# Patient Record
Sex: FEMALE | Race: ASIAN | NOT HISPANIC OR LATINO | Employment: OTHER | URBAN - METROPOLITAN AREA
[De-identification: names, ages, dates, MRNs, and addresses within clinical notes are randomized per-mention and may not be internally consistent; named-entity substitution may affect disease eponyms.]

---

## 2020-10-07 PROBLEM — I87.2 VENOUS STASIS DERMATITIS OF LEFT LOWER EXTREMITY: Status: ACTIVE | Noted: 2020-10-07

## 2020-10-07 PROBLEM — I73.9 PERIPHERAL VASCULAR DISEASE (HCC): Status: ACTIVE | Noted: 2020-10-07

## 2020-11-23 ENCOUNTER — OFFICE VISIT (OUTPATIENT)
Dept: VASCULAR LAB | Facility: MEDICAL CENTER | Age: 70
End: 2020-11-23
Attending: FAMILY MEDICINE
Payer: MEDICARE

## 2020-11-23 VITALS
BODY MASS INDEX: 36.19 KG/M2 | SYSTOLIC BLOOD PRESSURE: 145 MMHG | HEIGHT: 64 IN | WEIGHT: 212 LBS | DIASTOLIC BLOOD PRESSURE: 95 MMHG | HEART RATE: 96 BPM

## 2020-11-23 DIAGNOSIS — I87.332 STASIS DERMATITIS OF LEFT LOWER EXTREMITY WITH VENOUS ULCER DUE TO CHRONIC PERIPHERAL VENOUS HYPERTENSION (HCC): ICD-10-CM

## 2020-11-23 DIAGNOSIS — M79.605 PAIN IN LEFT LEG: ICD-10-CM

## 2020-11-23 DIAGNOSIS — G14 POSTPOLIOMYELITIS SYNDROME: ICD-10-CM

## 2020-11-23 DIAGNOSIS — I87.2 CHRONIC VENOUS INSUFFICIENCY: ICD-10-CM

## 2020-11-23 DIAGNOSIS — R60.0 LOCALIZED EDEMA: ICD-10-CM

## 2020-11-23 DIAGNOSIS — R03.0 ELEVATED BLOOD-PRESSURE READING WITHOUT DIAGNOSIS OF HYPERTENSION: ICD-10-CM

## 2020-11-23 PROCEDURE — 99212 OFFICE O/P EST SF 10 MIN: CPT

## 2020-11-23 PROCEDURE — 99204 OFFICE O/P NEW MOD 45 MIN: CPT | Performed by: FAMILY MEDICINE

## 2020-11-23 ASSESSMENT — ENCOUNTER SYMPTOMS
COUGH: 0
WHEEZING: 0
DIARRHEA: 0
SORE THROAT: 0
ABDOMINAL PAIN: 0
BLOOD IN STOOL: 0
NAUSEA: 0
MYALGIAS: 0
HEMOPTYSIS: 0
DOUBLE VISION: 0
CLAUDICATION: 0
WEAKNESS: 0
SHORTNESS OF BREATH: 0
VOMITING: 0
NERVOUS/ANXIOUS: 0
TREMORS: 0
PALPITATIONS: 0
SEIZURES: 0
FOCAL WEAKNESS: 0
ORTHOPNEA: 0
INSOMNIA: 0
FEVER: 0
BLURRED VISION: 0
DIZZINESS: 0
BRUISES/BLEEDS EASILY: 0
CHILLS: 0
HEADACHES: 0
DEPRESSION: 0

## 2020-11-23 NOTE — PATIENT INSTRUCTIONS
"- check venous reflux duplex to eval for possible options for interventions   - start/continue knee-high compression socks, 20-30mmHg, as much as tolerated    - increase walking, avoid prolonged standing   - elevated legs while sitting and sleeping above heart level  - reduce sodium (\"salt\") in diet to less than 2,000mg daily   - continue daily moisturizing lotion (such as Gold Bond Diabetic Foot Cream)    "

## 2020-11-23 NOTE — PROGRESS NOTES
INITIAL VASCULAR VISIT  Subjective:   Donna Palmer is a 69 y.o. y.o. female  who presents today 11/23/20  for   Chief Complaint   Patient presents with   • Office Visit     consult - Peripheral vascular disease      initially referred by Roman Walton (FNP, PCP) for eval and med mgmt of CVI, PAD     HPI:    CVI:   Seen by PCP and tx with keflex for venous stasis derm with secondary cellulitis on 10/7.    Reports worsening redness and swelling.   Was also painful.   Currently states reduced swelling and redness.     Having recurrent infections.   No recent labs or imaging completed   Had prior eval with Dr. Driscoll 7 yrs ago - had some reflux.     HTN:  Current HTN concerns: Denies   Current ADRs: No  HTN sx:  No current blurred or changed vision, chest pain, shortness of breath, headache, nausea, dizziness/vertigo   Home BP log: not checking   24h ABPM completed: not tested  Adherence to current HTN meds: compliant all of the time    Hyperlipidemia:    No prior dx or meds     Antiplatelet/anticoagulation:   no    Type 2 DM:  No     CKD:    No     Sleeping disorder/FRANK:   Reports heavy snoring per , gets adequate sleep  No PSG  No problems with work day somnolence.      Hypothyroidism:   No     Clinical evidence of ASCVD:    1) hx of MI or other ACS:  no  2) coronary or other revasc procedure: no  3) TIA/ischemic CVA: no  4) AS-related PAD (including MONICA <0.9): no  5) other AS diseases (renal AS, AA due to AS, carotid plaque >50% stenosis): no  6) evidence of AS from imaging (angiography, stress tests, CAC >300 or >74%ile for age/gender): no    Major ASCVD risk factors (probably 2 ):    1) Age (Men>44, women>54):  yes   2) Fhx early CHD (MI, SCD, coronary revasc procedures in men <55, women <65):  no   3) cigarette smoking:   reports that she has never smoked. She has never used smokeless tobacco.   4) high BP >139/89 or on tx: no   5) Low HDL-C (<40 men, <50 women):No results found for: HDL    Past Medical  History:   Diagnosis Date   • Chronic venous insufficiency    • Polio osteopathy of lower leg (HCC)      Past Surgical History:   Procedure Laterality Date   • ELBOW ORIF Right 2008   • PRIMARY C SECTION        Current Outpatient Medications on File Prior to Visit   Medication Sig Dispense Refill   • Multiple Vitamins-Minerals (MULTIVITAMIN ADULT PO) Take  by mouth.       No current facility-administered medications on file prior to visit.      Allergies   Allergen Reactions   • Penicillin G    • Tetracycline      Family History   Problem Relation Age of Onset   • Kidney Disease Father          93   • No Known Problems Brother    • Clotting Disorder Neg Hx    • Stroke Neg Hx    • Heart Attack Neg Hx         Social History     Tobacco Use   • Smoking status: Never Smoker   • Smokeless tobacco: Never Used   Substance Use Topics   • Alcohol use: No   • Drug use: No     DIET AND EXERCISE:  Weight Change: stable   BMI Readings from Last 5 Encounters:   20 36.39 kg/m²   10/07/20 37.55 kg/m²   19 34.84 kg/m²   19 35.36 kg/m²   19 35.02 kg/m²      Diet: common adult  Exercise: no regular exercise program     Review of Systems   Constitutional: Negative for chills, fever and malaise/fatigue.   HENT: Negative for nosebleeds, sore throat and tinnitus.    Eyes: Negative for blurred vision and double vision.   Respiratory: Negative for cough, hemoptysis, shortness of breath and wheezing.    Cardiovascular: Positive for leg swelling. Negative for chest pain, palpitations, orthopnea and claudication.   Gastrointestinal: Negative for abdominal pain, blood in stool, diarrhea, melena, nausea and vomiting.   Genitourinary: Negative for hematuria.   Musculoskeletal: Negative for joint pain and myalgias.   Skin: Negative for itching and rash.   Neurological: Negative for dizziness, tremors, focal weakness, seizures, weakness and headaches.   Endo/Heme/Allergies: Does not bruise/bleed easily.    "  Psychiatric/Behavioral: Negative for depression. The patient is not nervous/anxious and does not have insomnia.       Objective:     Vitals:    20 1358 20 1400   BP: 151/83 145/95   BP Location: Right arm Right arm   Patient Position: Sitting Sitting   BP Cuff Size: Adult Adult   Pulse: 98 96   Weight: 96.2 kg (212 lb)    Height: 1.626 m (5' 4\")       BP Readings from Last 5 Encounters:   20 145/95   10/07/20 156/84   19 124/68   19 132/84   19 110/72      Body mass index is 36.39 kg/m².  Physical Exam  Vitals signs reviewed.   Constitutional:       Appearance: Normal appearance.   HENT:      Head: Normocephalic and atraumatic.      Nose: Nose normal.      Mouth/Throat:      Mouth: Mucous membranes are moist.      Pharynx: Oropharynx is clear.   Eyes:      Extraocular Movements: Extraocular movements intact.      Conjunctiva/sclera: Conjunctivae normal.   Neck:      Musculoskeletal: Normal range of motion and neck supple.   Cardiovascular:      Rate and Rhythm: Normal rate and regular rhythm.      Pulses: Normal pulses.           Carotid pulses are 2+ on the right side and 2+ on the left side.       Radial pulses are 2+ on the right side and 2+ on the left side.        Dorsalis pedis pulses are 2+ on the right side and 2+ on the left side.        Posterior tibial pulses are 2+ on the right side and 2+ on the left side.      Heart sounds: Normal heart sounds.      Comments:  stemmer's negative     Spider telangectasia:       RLE:  None      LLE: none   Varicosities:           RLE: none      LLE: none   Corona phlebectatica:      RLE:  None        LLE:  None   Cording:         RLE:  None     LLE: None     Pulmonary:      Effort: Pulmonary effort is normal.      Breath sounds: Normal breath sounds.   Abdominal:      General: Abdomen is flat. Bowel sounds are normal.      Palpations: Abdomen is soft.   Musculoskeletal:      Right lower leg: No edema.      Left lower le+ Edema " "present.   Skin:     General: Skin is warm and dry.      Capillary Refill: Capillary refill takes less than 2 seconds.          Neurological:      General: No focal deficit present.      Mental Status: She is alert and oriented to person, place, and time. Mental status is at baseline.   Psychiatric:         Mood and Affect: Mood normal.         Behavior: Behavior normal.                               VASCULAR IMAGING:   Last EKG: No results found for this or any previous visit.      Medical Decision Making:  Today's Assessment / Status / Plan:     1. Chronic venous insufficiency  US-EXTREMITY VENOUS LOWER UNILAT LEFT    US-MONICA SINGLE LEVEL BILAT   2. Localized edema  US-MONICA SINGLE LEVEL BILAT   3. BMI 36.0-36.9,adult     4. Postpoliomyelitis syndrome     5. Pain in left leg  US-EXTREMITY VENOUS LOWER UNILAT LEFT    US-MONICA SINGLE LEVEL BILAT   6. Stasis dermatitis of left lower extremity with venous ulcer due to chronic peripheral venous hypertension (HCC)  US-EXTREMITY VENOUS LOWER UNILAT LEFT    US-MONICA SINGLE LEVEL BILAT   7. Elevated blood-pressure reading without diagnosis of hypertension  CBC WITH DIFFERENTIAL    Lipid Profile    Comp Metabolic Panel    TSH WITH REFLEX TO FT4    URINALYSIS    MICROALBUMIN CREAT RATIO URINE     Patient Type: Primary Prevention    Etiology of Established CVD if Present:   1) CVI     Chronic venous insufficiency  CEAP classification: C4aS or C5S / Ep / An / Pn   No signs of active ulcerations but possible healed at LLE   - reviewed pathophys and gave handouts   - check venous reflux duplex to eval for possible options for interventions if noted to have isolated superficial reflux only,  If deep and/or mixed then med mgmt only   - eval MONICA for art insuff due to leg pain   - start/continue knee-high compression socks, 20-30mmHg, as much as tolerated    - increase walking, avoid prolonged standing   - elevated legs while sitting and sleeping above heart level  - reduce sodium (\"salt\") " in diet to less than 2,000mg daily   - continue daily moisturizing lotion (such as Gold Bond Diabetic Foot Cream)  Phlebotonics:  - consider horse chestnut seed extract 300mg 2 times daily    Antithrombotic therapy:  Indication: possibly if has prior venous ulcers   - will consider initiation of aspirin     Lipid Management: Qualifies for Statin Therapy Based on 2018 ACC/AHA Guidelines: unknown  Calculated 10-Year Risk of ASCVD (if LDL <189, no CKD G3b/4/5): pending   Currently on Statin: No  NLA/ACC/AHA risk category:  Moderate vs high   - update labs  - consider statin therapy     Blood Pressure Management:  ACC/AHA (2017) goal <130/80  Home BP at goal:  yes  Office BP at goal:  no  Indications of end organ damage:   Echo/EKG: N/A    UACR: pending    Renal parenchymal disease:  pending   Ophthalmo: N/A    Device candidate? no  Plan:   Monitoring:   - start/continue home BP monitoring, reviewed correct technique, provide BP log and instructions  - order 24h ABPM:  NO  - monitor lytes/gfr routinely   - contact office if BP consistently >140/>90 to discussion of tx adjustments   Medications:  ACEi/ARB: use as 1st line agent   DHP-CCB: none   Thiazide: none   Faizan-receptor Antagonist: not indicated at this time     Glycemic Status: Normal    Smoking:   reports that she has never smoked. She has never used smokeless tobacco.   - continued complete avoidance of all tobacco products     Physical Activity: continue healthy activity to improve CV fitness, see care instructions for additional details     Weight Management and Nutrition: Dietary plan was discussed with patient at this visit including DASH, low sodium and/or as outlined in care instructions     Other:   1) postpolio syndrome  - limited mobility, use chair exercises     Instructed to follow-up with PCP for remainder of adult medical needs: yes  We will partner with other providers in the management of established vascular disease and cardiometabolic risk  factors.    Studies to Be Obtained: MONICA, VR duplex    Labs to Be Obtained: as noted above     Follow up in: Abhishek with demi Merlos M.D.  [facility]   [facility]

## 2020-12-07 ENCOUNTER — HOSPITAL ENCOUNTER (OUTPATIENT)
Dept: LAB | Facility: MEDICAL CENTER | Age: 70
End: 2020-12-07
Attending: FAMILY MEDICINE
Payer: MEDICARE

## 2020-12-07 ENCOUNTER — APPOINTMENT (OUTPATIENT)
Dept: RADIOLOGY | Facility: MEDICAL CENTER | Age: 70
End: 2020-12-07
Attending: FAMILY MEDICINE
Payer: MEDICARE

## 2020-12-07 DIAGNOSIS — R03.0 ELEVATED BLOOD-PRESSURE READING WITHOUT DIAGNOSIS OF HYPERTENSION: ICD-10-CM

## 2020-12-07 LAB
ALBUMIN SERPL BCP-MCNC: 4.3 G/DL (ref 3.2–4.9)
ALBUMIN/GLOB SERPL: 1.3 G/DL
ALP SERPL-CCNC: 80 U/L (ref 30–99)
ALT SERPL-CCNC: 27 U/L (ref 2–50)
ANION GAP SERPL CALC-SCNC: 9 MMOL/L (ref 7–16)
APPEARANCE UR: CLEAR
AST SERPL-CCNC: 25 U/L (ref 12–45)
BACTERIA #/AREA URNS HPF: NEGATIVE /HPF
BASOPHILS # BLD AUTO: 0.6 % (ref 0–1.8)
BASOPHILS # BLD: 0.05 K/UL (ref 0–0.12)
BILIRUB SERPL-MCNC: 0.4 MG/DL (ref 0.1–1.5)
BILIRUB UR QL STRIP.AUTO: NEGATIVE
BUN SERPL-MCNC: 14 MG/DL (ref 8–22)
CALCIUM SERPL-MCNC: 9.8 MG/DL (ref 8.5–10.5)
CHLORIDE SERPL-SCNC: 103 MMOL/L (ref 96–112)
CO2 SERPL-SCNC: 26 MMOL/L (ref 20–33)
COLOR UR: YELLOW
CREAT SERPL-MCNC: 0.59 MG/DL (ref 0.5–1.4)
CREAT UR-MCNC: 36.03 MG/DL
EOSINOPHIL # BLD AUTO: 0.08 K/UL (ref 0–0.51)
EOSINOPHIL NFR BLD: 1 % (ref 0–6.9)
EPI CELLS #/AREA URNS HPF: NEGATIVE /HPF
ERYTHROCYTE [DISTWIDTH] IN BLOOD BY AUTOMATED COUNT: 44.4 FL (ref 35.9–50)
GLOBULIN SER CALC-MCNC: 3.3 G/DL (ref 1.9–3.5)
GLUCOSE SERPL-MCNC: 100 MG/DL (ref 65–99)
GLUCOSE UR STRIP.AUTO-MCNC: NEGATIVE MG/DL
HCT VFR BLD AUTO: 46.4 % (ref 37–47)
HGB BLD-MCNC: 14.9 G/DL (ref 12–16)
HYALINE CASTS #/AREA URNS LPF: NORMAL /LPF
IMM GRANULOCYTES # BLD AUTO: 0.03 K/UL (ref 0–0.11)
IMM GRANULOCYTES NFR BLD AUTO: 0.4 % (ref 0–0.9)
KETONES UR STRIP.AUTO-MCNC: NEGATIVE MG/DL
LEUKOCYTE ESTERASE UR QL STRIP.AUTO: ABNORMAL
LYMPHOCYTES # BLD AUTO: 3.07 K/UL (ref 1–4.8)
LYMPHOCYTES NFR BLD: 38.7 % (ref 22–41)
MCH RBC QN AUTO: 28.7 PG (ref 27–33)
MCHC RBC AUTO-ENTMCNC: 32.1 G/DL (ref 33.6–35)
MCV RBC AUTO: 89.4 FL (ref 81.4–97.8)
MICRO URNS: ABNORMAL
MICROALBUMIN UR-MCNC: <1.2 MG/DL
MICROALBUMIN/CREAT UR: NORMAL MG/G (ref 0–30)
MONOCYTES # BLD AUTO: 0.59 K/UL (ref 0–0.85)
MONOCYTES NFR BLD AUTO: 7.4 % (ref 0–13.4)
NEUTROPHILS # BLD AUTO: 4.12 K/UL (ref 2–7.15)
NEUTROPHILS NFR BLD: 51.9 % (ref 44–72)
NITRITE UR QL STRIP.AUTO: NEGATIVE
NRBC # BLD AUTO: 0 K/UL
NRBC BLD-RTO: 0 /100 WBC
PH UR STRIP.AUTO: 6.5 [PH] (ref 5–8)
PLATELET # BLD AUTO: 287 K/UL (ref 164–446)
PMV BLD AUTO: 10.1 FL (ref 9–12.9)
POTASSIUM SERPL-SCNC: 4.4 MMOL/L (ref 3.6–5.5)
PROT SERPL-MCNC: 7.6 G/DL (ref 6–8.2)
PROT UR QL STRIP: NEGATIVE MG/DL
RBC # BLD AUTO: 5.19 M/UL (ref 4.2–5.4)
RBC # URNS HPF: NORMAL /HPF
RBC UR QL AUTO: NEGATIVE
SODIUM SERPL-SCNC: 138 MMOL/L (ref 135–145)
SP GR UR STRIP.AUTO: 1.01
UROBILINOGEN UR STRIP.AUTO-MCNC: 0.2 MG/DL
WBC # BLD AUTO: 7.9 K/UL (ref 4.8–10.8)
WBC #/AREA URNS HPF: NORMAL /HPF

## 2020-12-07 PROCEDURE — 82570 ASSAY OF URINE CREATININE: CPT

## 2020-12-07 PROCEDURE — 81001 URINALYSIS AUTO W/SCOPE: CPT

## 2020-12-07 PROCEDURE — 80053 COMPREHEN METABOLIC PANEL: CPT

## 2020-12-07 PROCEDURE — 82043 UR ALBUMIN QUANTITATIVE: CPT

## 2020-12-07 PROCEDURE — 36415 COLL VENOUS BLD VENIPUNCTURE: CPT

## 2020-12-07 PROCEDURE — 85025 COMPLETE CBC W/AUTO DIFF WBC: CPT

## 2020-12-08 ENCOUNTER — HOSPITAL ENCOUNTER (OUTPATIENT)
Dept: RADIOLOGY | Facility: MEDICAL CENTER | Age: 70
End: 2020-12-08
Attending: FAMILY MEDICINE
Payer: MEDICARE

## 2020-12-08 DIAGNOSIS — I87.332 STASIS DERMATITIS OF LEFT LOWER EXTREMITY WITH VENOUS ULCER DUE TO CHRONIC PERIPHERAL VENOUS HYPERTENSION (HCC): ICD-10-CM

## 2020-12-08 DIAGNOSIS — M79.605 PAIN IN LEFT LEG: ICD-10-CM

## 2020-12-08 DIAGNOSIS — I87.2 CHRONIC VENOUS INSUFFICIENCY: ICD-10-CM

## 2020-12-08 PROCEDURE — 93922 UPR/L XTREMITY ART 2 LEVELS: CPT

## 2020-12-08 PROCEDURE — 93922 UPR/L XTREMITY ART 2 LEVELS: CPT | Mod: 26 | Performed by: INTERNAL MEDICINE

## 2021-01-05 ASSESSMENT — ENCOUNTER SYMPTOMS
ORTHOPNEA: 0
FEVER: 0
DEPRESSION: 0
FOCAL WEAKNESS: 0
INSOMNIA: 0
SORE THROAT: 0
HEADACHES: 0
PALPITATIONS: 0
DIARRHEA: 0
BRUISES/BLEEDS EASILY: 0
SEIZURES: 0
WHEEZING: 0
HEMOPTYSIS: 0
SHORTNESS OF BREATH: 0
CLAUDICATION: 0
NAUSEA: 0
BLURRED VISION: 0
TREMORS: 0
COUGH: 0
MYALGIAS: 0
DIZZINESS: 0
ABDOMINAL PAIN: 0
VOMITING: 0
NERVOUS/ANXIOUS: 0
DOUBLE VISION: 0
BLOOD IN STOOL: 0
CHILLS: 0

## 2021-01-05 NOTE — PROGRESS NOTES
FOLLOW UP VASCULAR VISIT  Subjective:   Donna Palmer is a 70 y.o. y.o. female  who presents today 1/7/21  for   Chief Complaint   Patient presents with   • Office Visit     f/u -  med mgmt of CVI, PAD     initially referred by Roman Walton (FNP, PCP) for eval and med mgmt of CVI, PAD     HPI:    CVI:   Seen by PCP and tx with keflex for venous stasis derm with secondary cellulitis on 10/7.    Reports worsening redness and swelling.   Was also painful.   Currently states reduced swelling and redness.     Having recurrent infections.   No recent labs or imaging completed   Had prior eval with Dr. Driscoll 7 yrs ago - had some reflux.   Venous reflux study cancelled by x-ray    HTN:  Current HTN concerns: Denies   Current ADRs: No  HTN sx:  No current blurred or changed vision, chest pain, shortness of breath, headache, nausea, dizziness/vertigo   Home BP log: not checking   24h ABPM completed: not tested  Adherence to current HTN meds: compliant all of the time    Hyperlipidemia:    No prior dx or meds  Lipid study cancelled by lab     Antiplatelet/anticoagulation:   no    Type 2 DM:  No     CKD:    No     Sleeping disorder/FRANK:   Reports heavy snoring per , gets adequate sleep  No PSG  No problems with work day somnolence.      Hypothyroidism: Thyroid study cancelled by lab   No     Clinical evidence of ASCVD:    1) hx of MI or other ACS:  no  2) coronary or other revasc procedure: no  3) TIA/ischemic CVA: no  4) AS-related PAD (including MONICA <0.9): no  5) other AS diseases (renal AS, AA due to AS, carotid plaque >50% stenosis): no  6) evidence of AS from imaging (angiography, stress tests, CAC >300 or >74%ile for age/gender): no    Major ASCVD risk factors (probably 2 ):    1) Age (Men>44, women>54):  yes   2) Fhx early CHD (MI, SCD, coronary revasc procedures in men <55, women <65):  no   3) cigarette smoking:   reports that she has never smoked. She has never used smokeless tobacco.   4) high BP >139/89 or  on tx: no   5) Low HDL-C (<40 men, <50 women):No results found for: HDL    Past Medical History:   Diagnosis Date   • Chronic venous insufficiency    • Polio osteopathy of lower leg (HCC)      Past Surgical History:   Procedure Laterality Date   • ELBOW ORIF Right 2008   • PRIMARY C SECTION        Current Outpatient Medications on File Prior to Visit   Medication Sig Dispense Refill   • Multiple Vitamins-Minerals (MULTIVITAMIN ADULT PO) Take  by mouth.       No current facility-administered medications on file prior to visit.      Allergies   Allergen Reactions   • Penicillin G    • Tetracycline      Family History   Problem Relation Age of Onset   • Kidney Disease Father          93   • No Known Problems Brother    • Clotting Disorder Neg Hx    • Stroke Neg Hx    • Heart Attack Neg Hx         Social History     Tobacco Use   • Smoking status: Never Smoker   • Smokeless tobacco: Never Used   Substance Use Topics   • Alcohol use: No   • Drug use: No     DIET AND EXERCISE:  Weight Change: stable   BMI Readings from Last 5 Encounters:   21 36.39 kg/m²   20 36.39 kg/m²   10/07/20 37.55 kg/m²   19 34.84 kg/m²   19 35.36 kg/m²      Diet: common adult  Exercise: no regular exercise program     Review of Systems   Constitutional: Negative for chills, fever and malaise/fatigue.   HENT: Negative for nosebleeds, sore throat and tinnitus.    Eyes: Negative for blurred vision and double vision.   Respiratory: Negative for cough, hemoptysis, shortness of breath and wheezing.    Cardiovascular: Positive for leg swelling. Negative for chest pain, palpitations, orthopnea and claudication.   Gastrointestinal: Negative for abdominal pain, blood in stool, diarrhea, melena, nausea and vomiting.   Genitourinary: Negative for hematuria.   Musculoskeletal: Negative for joint pain and myalgias.   Skin: Negative for itching and rash.   Neurological: Positive for weakness (post polio syndrome). Negative  "for dizziness, tremors, focal weakness, seizures and headaches.   Endo/Heme/Allergies: Does not bruise/bleed easily.   Psychiatric/Behavioral: Negative for depression. The patient is not nervous/anxious and does not have insomnia.       Objective:     Vitals:    01/07/21 1356 01/07/21 1359   BP: 155/77 143/71   BP Location: Left arm Left arm   Patient Position: Sitting Sitting   BP Cuff Size: Adult Adult   Pulse: 86 81   Weight: 96.2 kg (212 lb)    Height: 1.626 m (5' 4\")       BP Readings from Last 5 Encounters:   01/07/21 143/71   11/23/20 145/95   10/07/20 156/84   06/17/19 124/68   04/23/19 132/84      Body mass index is 36.39 kg/m².  Physical Exam  Vitals signs reviewed.   Constitutional:       Appearance: Normal appearance.   HENT:      Head: Normocephalic and atraumatic.      Nose: Nose normal.      Mouth/Throat:      Mouth: Mucous membranes are moist.      Pharynx: Oropharynx is clear.   Eyes:      Extraocular Movements: Extraocular movements intact.      Conjunctiva/sclera: Conjunctivae normal.   Neck:      Musculoskeletal: Normal range of motion and neck supple.   Cardiovascular:      Rate and Rhythm: Normal rate and regular rhythm.      Pulses: Normal pulses.           Carotid pulses are 2+ on the right side and 2+ on the left side.       Radial pulses are 2+ on the right side and 2+ on the left side.        Dorsalis pedis pulses are 2+ on the right side and 2+ on the left side.        Posterior tibial pulses are 2+ on the right side and 2+ on the left side.      Heart sounds: Normal heart sounds.      Comments:  stemmer's negative     Spider telangectasia:       RLE:  None      LLE: none   Varicosities:           RLE: none      LLE: none   Corona phlebectatica:      RLE:  None        LLE:  None   Cording:         RLE:  None     LLE: None     Pulmonary:      Effort: Pulmonary effort is normal.      Breath sounds: Normal breath sounds.   Abdominal:      General: Abdomen is flat. Bowel sounds are normal.    "   Palpations: Abdomen is soft.   Musculoskeletal:      Right lower leg: No edema.      Left lower le+ Edema present.   Skin:     General: Skin is warm and dry.      Capillary Refill: Capillary refill takes less than 2 seconds.          Neurological:      General: No focal deficit present.      Mental Status: She is alert and oriented to person, place, and time. Mental status is at baseline.   Psychiatric:         Mood and Affect: Mood normal.         Behavior: Behavior normal.                    Lab Results   Component Value Date    SODIUM 138 2020    POTASSIUM 4.4 2020    CHLORIDE 103 2020    CO2 26 2020    GLUCOSE 100 (H) 2020    BUN 14 2020    CREATININE 0.59 2020    IFAFRICA >60 2020    IFNOTAFR >60 2020        Lab Results   Component Value Date    WBC 7.9 2020    RBC 5.19 2020    HEMOGLOBIN 14.9 2020    HEMATOCRIT 46.4 2020    MCV 89.4 2020    MCH 28.7 2020    MCHC 32.1 (L) 2020    MPV 10.1 2020       VASCULAR IMAGING:   Last EKG: No results found for this or any previous visit.     MONICA 2020   Ankle-brachial index is normal.   Medical Decision Making:  Today's Assessment / Status / Plan:     1. Chronic venous insufficiency  REFERRAL TO VASCULAR MEDICINE    US-EXTREMITY VENOUS LOWER UNILAT LEFT    CANCELED: US-EXTREMITY VENOUS LOWER UNILAT LEFT   2. Pain in left leg  REFERRAL TO VASCULAR MEDICINE    US-EXTREMITY VENOUS LOWER UNILAT LEFT    CANCELED: US-EXTREMITY VENOUS LOWER UNILAT LEFT   3. Stasis dermatitis of left lower extremity with venous ulcer due to chronic peripheral venous hypertension (HCC)  REFERRAL TO VASCULAR MEDICINE    US-EXTREMITY VENOUS LOWER UNILAT LEFT    CANCELED: US-EXTREMITY VENOUS LOWER UNILAT LEFT   4. Screening for lipid disorders  LIPID PANEL    REFERRAL TO VASCULAR MEDICINE   5. Thyroid disorder screening  TSH+FREE T4    REFERRAL TO VASCULAR MEDICINE     Patient Type: Primary  "Prevention    Etiology of Established CVD if Present:   1) CVI     Chronic venous insufficiency  CEAP classification: C4aS or C5S / Ep / An / Pn   No signs of active ulcerations but possible healed at LLE   MONICA dcompleted but reflux study was cancelled by radiology   - reviewed pathophys and gave handouts   - check venous reflux duplex to eval for possible options for interventions if noted to have isolated superficial reflux only,  If deep and/or mixed then med mgmt only   - eval MONICA for art insuff due to leg pain   - start/continue knee-high compression socks, 20-30mmHg, as much as tolerated    - increase walking, avoid prolonged standing   - elevated legs while sitting and sleeping above heart level  - reduce sodium (\"salt\") in diet to less than 2,000mg daily   - continue daily moisturizing lotion (such as Gold Bond Diabetic Foot Cream)  Phlebotonics:  - consider horse chestnut seed extract 300mg 2 times daily    Antithrombotic therapy:  Indication: possibly if has prior venous ulcers   - will consider initiation of aspirin     Lipid Management: Qualifies for Statin Therapy Based on 2018 ACC/AHA Guidelines: unknown  Calculated 10-Year Risk of ASCVD (if LDL <189, no CKD G3b/4/5): pending   Currently on Statin: No, lipid dc's by lab   NLA/ACC/AHA risk category:  Moderate vs high   - update labs  - consider statin therapy     Blood Pressure Management:  ACC/AHA (2017) goal <130/80  Home BP at goal:  yes  Office BP at goal:  No   Indications of end organ damage:   Echo/EKG: N/A    UACR: pending    Renal parenchymal disease:  pending   Ophthalmo: N/A    Device candidate? no  Plan:   Monitoring: Home log 114-143/80's scanned into record  - start/continue home BP monitoring, reviewed correct technique, provide BP log and instructions  - order 24h ABPM:  NO  - monitor lytes/gfr routinely   - contact office if BP consistently >140/>90 to discussion of tx adjustments -Pt not interested in treatment at this time     "     Medications:  ACEi/ARB: use as 1st line agent   DHP-CCB: none   Thiazide: none   Faizan-receptor Antagonist: not indicated at this time     Glycemic Status: Normal    Smoking:   reports that she has never smoked. She has never used smokeless tobacco.   - continued complete avoidance of all tobacco products     Physical Activity: continue healthy activity to improve CV fitness, see care instructions for additional details     Weight Management and Nutrition: Dietary plan was discussed with patient at this visit including DASH, low sodium and/or as outlined in care instructions     Other:   1) postpolio syndrome  - limited mobility, use chair exercises     Instructed to follow-up with PCP for remainder of adult medical needs: yes  We will partner with other providers in the management of established vascular disease and cardiometabolic risk factors.    Studies to Be Obtained:  VR duplex reordered   Labs to Be Obtained: Tsh, lipids      Follow up in: Feb requested Dr. Gaurang Herrmann, A.P.RUBEN.  [facility]   [facility]

## 2021-01-07 ENCOUNTER — OFFICE VISIT (OUTPATIENT)
Dept: VASCULAR LAB | Facility: MEDICAL CENTER | Age: 71
End: 2021-01-07
Attending: NURSE PRACTITIONER
Payer: MEDICARE

## 2021-01-07 VITALS
SYSTOLIC BLOOD PRESSURE: 143 MMHG | WEIGHT: 212 LBS | HEIGHT: 64 IN | HEART RATE: 81 BPM | BODY MASS INDEX: 36.19 KG/M2 | DIASTOLIC BLOOD PRESSURE: 71 MMHG

## 2021-01-07 DIAGNOSIS — M79.605 PAIN IN LEFT LEG: ICD-10-CM

## 2021-01-07 DIAGNOSIS — Z13.29 THYROID DISORDER SCREENING: ICD-10-CM

## 2021-01-07 DIAGNOSIS — I87.332 STASIS DERMATITIS OF LEFT LOWER EXTREMITY WITH VENOUS ULCER DUE TO CHRONIC PERIPHERAL VENOUS HYPERTENSION (HCC): ICD-10-CM

## 2021-01-07 DIAGNOSIS — I87.2 CHRONIC VENOUS INSUFFICIENCY: ICD-10-CM

## 2021-01-07 DIAGNOSIS — Z13.220 SCREENING FOR LIPID DISORDERS: ICD-10-CM

## 2021-01-07 PROCEDURE — 99212 OFFICE O/P EST SF 10 MIN: CPT | Performed by: NURSE PRACTITIONER

## 2021-01-07 PROCEDURE — 99213 OFFICE O/P EST LOW 20 MIN: CPT | Performed by: NURSE PRACTITIONER

## 2021-01-07 ASSESSMENT — ENCOUNTER SYMPTOMS: WEAKNESS: 1

## 2021-01-07 ASSESSMENT — FIBROSIS 4 INDEX: FIB4 SCORE: 1.14

## 2021-01-25 ENCOUNTER — HOSPITAL ENCOUNTER (OUTPATIENT)
Dept: RADIOLOGY | Facility: MEDICAL CENTER | Age: 71
End: 2021-01-25
Attending: NURSE PRACTITIONER
Payer: MEDICARE

## 2021-01-25 DIAGNOSIS — I87.2 CHRONIC VENOUS INSUFFICIENCY: ICD-10-CM

## 2021-01-25 DIAGNOSIS — I87.332 STASIS DERMATITIS OF LEFT LOWER EXTREMITY WITH VENOUS ULCER DUE TO CHRONIC PERIPHERAL VENOUS HYPERTENSION (HCC): ICD-10-CM

## 2021-01-25 DIAGNOSIS — M79.605 PAIN IN LEFT LEG: ICD-10-CM

## 2021-01-25 PROCEDURE — 93971 EXTREMITY STUDY: CPT | Mod: LT

## 2021-01-25 PROCEDURE — 93971 EXTREMITY STUDY: CPT | Mod: 26 | Performed by: INTERNAL MEDICINE

## 2021-02-25 ENCOUNTER — OFFICE VISIT (OUTPATIENT)
Dept: VASCULAR LAB | Facility: MEDICAL CENTER | Age: 71
End: 2021-02-25
Attending: FAMILY MEDICINE
Payer: MEDICARE

## 2021-02-25 VITALS
HEART RATE: 82 BPM | SYSTOLIC BLOOD PRESSURE: 165 MMHG | HEIGHT: 64 IN | DIASTOLIC BLOOD PRESSURE: 73 MMHG | BODY MASS INDEX: 36.19 KG/M2 | WEIGHT: 212 LBS

## 2021-02-25 DIAGNOSIS — I87.332 STASIS DERMATITIS OF LEFT LOWER EXTREMITY WITH VENOUS ULCER DUE TO CHRONIC PERIPHERAL VENOUS HYPERTENSION (HCC): ICD-10-CM

## 2021-02-25 DIAGNOSIS — I87.2 CHRONIC VENOUS INSUFFICIENCY: ICD-10-CM

## 2021-02-25 DIAGNOSIS — R03.0 ELEVATED BLOOD PRESSURE READING WITHOUT DIAGNOSIS OF HYPERTENSION: ICD-10-CM

## 2021-02-25 DIAGNOSIS — R21 RASH: ICD-10-CM

## 2021-02-25 PROCEDURE — 99214 OFFICE O/P EST MOD 30 MIN: CPT | Performed by: FAMILY MEDICINE

## 2021-02-25 PROCEDURE — 99212 OFFICE O/P EST SF 10 MIN: CPT

## 2021-02-25 RX ORDER — TRIAMCINOLONE ACETONIDE 5 MG/G
CREAM TOPICAL
Qty: 60 G | Refills: 0 | Status: SHIPPED | OUTPATIENT
Start: 2021-02-25 | End: 2022-08-26

## 2021-02-25 ASSESSMENT — ENCOUNTER SYMPTOMS
COUGH: 0
SORE THROAT: 0
PALPITATIONS: 0
BRUISES/BLEEDS EASILY: 0
DOUBLE VISION: 0
NAUSEA: 0
HEMOPTYSIS: 0
FEVER: 0
ABDOMINAL PAIN: 0
DEPRESSION: 0
MYALGIAS: 0
WHEEZING: 0
DIZZINESS: 0
BLURRED VISION: 0
CLAUDICATION: 0
NERVOUS/ANXIOUS: 0
SEIZURES: 0
CHILLS: 0
WEAKNESS: 1
INSOMNIA: 0
ORTHOPNEA: 0
HEADACHES: 0
TREMORS: 0
VOMITING: 0
DIARRHEA: 0
SHORTNESS OF BREATH: 0
FOCAL WEAKNESS: 0
BLOOD IN STOOL: 0

## 2021-02-25 ASSESSMENT — FIBROSIS 4 INDEX: FIB4 SCORE: 1.14

## 2021-02-25 NOTE — PROGRESS NOTES
FOLLOW UP VASCULAR VISIT  Subjective:   Donna Palmer is a 70 y.o. y.o. female  who presents today 23 for   Chief Complaint   Patient presents with   • Follow-Up   initially referred by Roman Walton (FNP, PCP) for eval and med mgmt of CVI, PAD     HPI:    CVI:   Interval hx/concerns: no changes in health status, had VR duplex that showed no venous reflux or other venous disease.   Was ordered for left leg, but reports reads Right leg in the text area.    pmhx:  Seen by PCP and tx with keflex for venous stasis derm with secondary cellulitis on 10/2020.  Had reports worsening redness and swelling.   Was also painful.   Having recurrent infections.   No recent labs or imaging completed   Had prior eval with Dr. Driscoll (vasc surg) 7 yrs ago - reports no interventions at that time.     HTN:  Current HTN concerns: Denies prior dx or meds.   Current ADRs: No  HTN sx:  No current blurred or changed vision, chest pain, shortness of breath, headache, nausea, dizziness/vertigo   Home BP los/80s   24h ABPM completed: not tested  Adherence to current HTN meds: compliant all of the time    Hyperlipidemia:    No prior dx or meds  Never had labs     Antiplatelet/anticoagulation: no    Sleeping disorder/FRANK:   Reports heavy snoring per , gets adequate sleep  No PSG  No problems with work day somnolence.        Current Outpatient Medications:   •  triamcinolone acetonide, Apply BID to affected areas for up to 14 days  •  Multiple Vitamins-Minerals (MULTIVITAMIN ADULT PO), Take  by mouth., Taking      Social History     Tobacco Use   • Smoking status: Never Smoker   • Smokeless tobacco: Never Used   Substance Use Topics   • Alcohol use: No   • Drug use: No     DIET AND EXERCISE:  Weight Change: stable   BMI Readings from Last 5 Encounters:   21 36.39 kg/m²   21 36.39 kg/m²   20 36.39 kg/m²   10/07/20 37.55 kg/m²   19 34.84 kg/m²      Diet: common adult  Exercise: no regular exercise  "program     Review of Systems   Constitutional: Negative for chills, fever and malaise/fatigue.   HENT: Negative for nosebleeds, sore throat and tinnitus.    Eyes: Negative for blurred vision and double vision.   Respiratory: Negative for cough, hemoptysis, shortness of breath and wheezing.    Cardiovascular: Positive for leg swelling. Negative for chest pain, palpitations, orthopnea and claudication.   Gastrointestinal: Negative for abdominal pain, blood in stool, diarrhea, melena, nausea and vomiting.   Genitourinary: Negative for hematuria.   Musculoskeletal: Negative for joint pain and myalgias.   Skin: Negative for itching and rash.   Neurological: Positive for weakness (post polio syndrome). Negative for dizziness, tremors, focal weakness, seizures and headaches.   Endo/Heme/Allergies: Does not bruise/bleed easily.   Psychiatric/Behavioral: Negative for depression. The patient is not nervous/anxious and does not have insomnia.       Objective:     Vitals:    02/25/21 1440 02/25/21 1444 02/25/21 1447   BP: 157/75 151/75 (!) 165/73   BP Location: Right arm Right arm Left arm   Patient Position: Sitting Sitting Sitting   BP Cuff Size: Adult Adult Adult   Pulse: 87 80 82   Weight: 96.2 kg (212 lb)     Height: 1.626 m (5' 4\")        BP Readings from Last 5 Encounters:   02/25/21 (!) 165/73   01/07/21 143/71   11/23/20 145/95   10/07/20 156/84   06/17/19 124/68      Body mass index is 36.39 kg/m².  Physical Exam  Vitals reviewed.   Constitutional:       Appearance: Normal appearance.   HENT:      Head: Normocephalic and atraumatic.      Nose: Nose normal.      Mouth/Throat:      Mouth: Mucous membranes are moist.      Pharynx: Oropharynx is clear.   Eyes:      Extraocular Movements: Extraocular movements intact.      Conjunctiva/sclera: Conjunctivae normal.   Cardiovascular:      Rate and Rhythm: Normal rate and regular rhythm.      Pulses: Normal pulses.           Carotid pulses are 2+ on the right side and 2+ on " the left side.       Radial pulses are 2+ on the right side and 2+ on the left side.        Dorsalis pedis pulses are 2+ on the right side and 2+ on the left side.        Posterior tibial pulses are 2+ on the right side and 2+ on the left side.      Heart sounds: Normal heart sounds.      Comments:  stemmer's negative     Spider telangectasia:       RLE:  None      LLE: none   Varicosities:           RLE: none      LLE: none   Corona phlebectatica:      RLE:  None        LLE:  None   Cording:         RLE:  None     LLE: None     Pulmonary:      Effort: Pulmonary effort is normal.      Breath sounds: Normal breath sounds.   Abdominal:      General: Abdomen is flat. Bowel sounds are normal.      Palpations: Abdomen is soft.   Musculoskeletal:      Cervical back: Normal range of motion and neck supple.      Right lower leg: No edema.      Left lower leg: No edema.   Skin:     General: Skin is warm and dry.      Capillary Refill: Capillary refill takes less than 2 seconds.          Neurological:      General: No focal deficit present.      Mental Status: She is alert and oriented to person, place, and time. Mental status is at baseline.   Psychiatric:         Mood and Affect: Mood normal.         Behavior: Behavior normal.                    Lab Results   Component Value Date    SODIUM 138 12/07/2020    POTASSIUM 4.4 12/07/2020    CHLORIDE 103 12/07/2020    CO2 26 12/07/2020    GLUCOSE 100 (H) 12/07/2020    BUN 14 12/07/2020    CREATININE 0.59 12/07/2020    IFAFRICA >60 12/07/2020    IFNOTAFR >60 12/07/2020        Lab Results   Component Value Date    WBC 7.9 12/07/2020    RBC 5.19 12/07/2020    HEMOGLOBIN 14.9 12/07/2020    HEMATOCRIT 46.4 12/07/2020    MCV 89.4 12/07/2020    MCH 28.7 12/07/2020    MCHC 32.1 (L) 12/07/2020    MPV 10.1 12/07/2020     VASCULAR IMAGING:   Last EKG: No results found for this or any previous visit.     MONICA 12/8/2020   Ankle-brachial index is normal.     VR duplex 1/25/21    Right lower  "extremity - No superficial or deep venous thrombosis. No     reflux of the superficial or deep veins.    Medical Decision Making:  Today's Assessment / Status / Plan:     1. Stasis dermatitis of left lower extremity with venous ulcer due to chronic peripheral venous hypertension (HCC)  triamcinolone acetonide (KENALOG) 0.5 % Cream   2. Chronic venous insufficiency     3. Rash  REFERRAL TO DERMATOLOGY   4. Elevated blood pressure reading without diagnosis of hypertension  REFERRAL TO 24-HOUR BLOOD PRESSURE MONITORING     Patient Type: Primary Prevention    Etiology of Established CVD if Present:   1) CVI     Chronic venous insufficiency  CEAP classification: C4aS or C5S / Ep / An / Pn   No signs of active ulcerations but possible healed at LLE   MONICA normal, VR duplex w/o signs of DVT or VR.   Discrepancy between order title (Left VR duplex) and text of report reading Right LE  - we will contact radiology dept to clarify this discrepancy.  May need to have pt return to get LLE VR duplex completed (which is her symptomatic leg).    - in the meantime, I recommend eval with [facility] derm for possible skin bx as this may need be venous related dermatitis   - trial of triamcinolone cream for symptomatic care   - continue knee-high compression socks, 20-30mmHg, as much as tolerated    - increase walking, avoid prolonged standing   - elevated legs while sitting and sleeping above heart level  - reduce sodium (\"salt\") in diet to less than 2,000mg daily   - continue daily moisturizing lotion (such as Gold Bond Diabetic Foot Cream)  Phlebotonics:  - consider horse chestnut seed extract 300mg 2 times daily    Antithrombotic therapy:  Indication: possibly if has prior venous ulcers   - will consider initiation of aspirin     Lipid Management:   Qualifies for Statin Therapy Based on 2018 ACC/AHA Guidelines: unknown  Calculated 10-Year Risk of ASCVD (if LDL <189, no CKD G3b/4/5): pending   Currently on Statin: No, lipid dc's by lab "   NLA/ACC/AHA risk category:  Moderate vs high   - needs labs updated   - consider statin therapy     Blood Pressure Management:  ACC/AHA (2017) goal <130/80  Home BP at goal:  no  Office BP at goal:  no   24h ABPM: ordered today   Indications of end organ damage: none   Device candidate? no  Plan:   Monitoring: Home log 114-143/80's scanned into record  - start/continue home BP monitoring, reviewed correct technique, provide BP log and instructions  - order 24h ABPM:  yes  - monitor lytes/gfr routinely   - contact office if BP consistently >140/>90 to discussion of tx adjustments -Pt not interested in treatment at this time   Medications:  ACEi/ARB: use as 1st line agent   DHP-CCB: none   Thiazide: none   Faizan-receptor Antagonist: not indicated at this time     Glycemic Status: Normal    Smoking:   reports that she has never smoked. She has never used smokeless tobacco.   - continued complete avoidance of all tobacco products     Physical Activity: continue healthy activity to improve CV fitness, see care instructions for additional details     Weight Management and Nutrition: Dietary plan was discussed with patient at this visit including DASH, low sodium and/or as outlined in care instructions     Other:     1) postpolio syndrome  - limited mobility, use chair exercises     Instructed to follow-up with PCP for remainder of adult medical needs: yes  We will partner with other providers in the management of established vascular disease and cardiometabolic risk factors.    Studies to Be Obtained:  None   Labs to Be Obtained:     Follow up in:  3mo with demi Merlos M.D.  [facility]   [facility]   [phone]

## 2021-03-04 ENCOUNTER — TELEPHONE (OUTPATIENT)
Dept: VASCULAR LAB | Facility: MEDICAL CENTER | Age: 71
End: 2021-03-04

## 2021-03-04 NOTE — TELEPHONE ENCOUNTER
Second time calling pt back to schedule ABPM. She answered and said she is going out of town and does not have a calender in front of her and decided she would call me back to schedule when she is ready.    ----- Message from Carolyne Wells sent at 3/4/2021 10:04 AM PST -----  Regarding: ABPM  Hi Marcellus,     Pt LVM to schedule ABPM appt. I called her back to attempt to schedule. When I called, pt asked if her  could  the monitor, possibly today. She's disabled and lives in [facility]. Can you please call pt?    Thanks,     Carolyne

## 2021-05-06 ENCOUNTER — TELEPHONE (OUTPATIENT)
Dept: VASCULAR LAB | Facility: MEDICAL CENTER | Age: 71
End: 2021-05-06

## 2021-06-09 ENCOUNTER — TELEPHONE (OUTPATIENT)
Dept: VASCULAR LAB | Facility: MEDICAL CENTER | Age: 71
End: 2021-06-09

## 2021-11-09 ENCOUNTER — TELEPHONE (OUTPATIENT)
Dept: HEALTH INFORMATION MANAGEMENT | Facility: OTHER | Age: 71
End: 2021-11-09

## 2021-11-09 NOTE — TELEPHONE ENCOUNTER
Outcome: Left Message    Please transfer to Patient Outreach Team at [phone] when patient returns call.      Attempt # 1

## 2022-08-26 ENCOUNTER — HOSPITAL ENCOUNTER (OUTPATIENT)
Dept: RADIOLOGY | Facility: MEDICAL CENTER | Age: 72
End: 2022-08-26
Attending: FAMILY MEDICINE
Payer: MEDICARE

## 2022-08-26 ENCOUNTER — HOSPITAL ENCOUNTER (OUTPATIENT)
Dept: LAB | Facility: MEDICAL CENTER | Age: 72
End: 2022-08-26
Attending: FAMILY MEDICINE
Payer: MEDICARE

## 2022-08-26 ENCOUNTER — OFFICE VISIT (OUTPATIENT)
Dept: VASCULAR LAB | Facility: MEDICAL CENTER | Age: 72
End: 2022-08-26
Attending: FAMILY MEDICINE
Payer: MEDICARE

## 2022-08-26 VITALS
HEIGHT: 64 IN | BODY MASS INDEX: 34.15 KG/M2 | SYSTOLIC BLOOD PRESSURE: 140 MMHG | DIASTOLIC BLOOD PRESSURE: 83 MMHG | HEART RATE: 84 BPM | WEIGHT: 200 LBS

## 2022-08-26 DIAGNOSIS — L03.119 CELLULITIS AND ABSCESS OF FOOT: ICD-10-CM

## 2022-08-26 DIAGNOSIS — R60.0 LOCALIZED EDEMA: ICD-10-CM

## 2022-08-26 DIAGNOSIS — I10 PRIMARY HYPERTENSION: ICD-10-CM

## 2022-08-26 DIAGNOSIS — R06.02 SOB (SHORTNESS OF BREATH): ICD-10-CM

## 2022-08-26 DIAGNOSIS — I87.2 CHRONIC VENOUS INSUFFICIENCY: ICD-10-CM

## 2022-08-26 DIAGNOSIS — M79.671 RIGHT FOOT PAIN: ICD-10-CM

## 2022-08-26 DIAGNOSIS — I87.2 STASIS DERMATITIS OF BOTH LEGS: ICD-10-CM

## 2022-08-26 DIAGNOSIS — L02.619 CELLULITIS AND ABSCESS OF FOOT: ICD-10-CM

## 2022-08-26 DIAGNOSIS — G14 POSTPOLIOMYELITIS SYNDROME: ICD-10-CM

## 2022-08-26 LAB
ALBUMIN SERPL BCP-MCNC: 4.6 G/DL (ref 3.2–4.9)
ALBUMIN/GLOB SERPL: 1.4 G/DL
ALP SERPL-CCNC: 70 U/L (ref 30–99)
ALT SERPL-CCNC: 18 U/L (ref 2–50)
ANION GAP SERPL CALC-SCNC: 16 MMOL/L (ref 7–16)
AST SERPL-CCNC: 22 U/L (ref 12–45)
BASOPHILS # BLD AUTO: 0.5 % (ref 0–1.8)
BASOPHILS # BLD: 0.04 K/UL (ref 0–0.12)
BILIRUB SERPL-MCNC: 0.3 MG/DL (ref 0.1–1.5)
BUN SERPL-MCNC: 17 MG/DL (ref 8–22)
CALCIUM SERPL-MCNC: 9.6 MG/DL (ref 8.5–10.5)
CHLORIDE SERPL-SCNC: 108 MMOL/L (ref 96–112)
CO2 SERPL-SCNC: 20 MMOL/L (ref 20–33)
CREAT SERPL-MCNC: 0.56 MG/DL (ref 0.5–1.4)
CRP SERPL HS-MCNC: 0.98 MG/DL (ref 0–0.75)
D DIMER PPP IA.FEU-MCNC: 0.73 UG/ML (FEU) (ref 0–0.5)
EOSINOPHIL # BLD AUTO: 0.09 K/UL (ref 0–0.51)
EOSINOPHIL NFR BLD: 1.2 % (ref 0–6.9)
ERYTHROCYTE [DISTWIDTH] IN BLOOD BY AUTOMATED COUNT: 45.9 FL (ref 35.9–50)
ERYTHROCYTE [SEDIMENTATION RATE] IN BLOOD BY WESTERGREN METHOD: 8 MM/HOUR (ref 0–25)
GFR SERPLBLD CREATININE-BSD FMLA CKD-EPI: 98 ML/MIN/1.73 M 2
GLOBULIN SER CALC-MCNC: 3.2 G/DL (ref 1.9–3.5)
GLUCOSE SERPL-MCNC: 97 MG/DL (ref 65–99)
HCT VFR BLD AUTO: 44.9 % (ref 37–47)
HGB BLD-MCNC: 14.3 G/DL (ref 12–16)
IMM GRANULOCYTES # BLD AUTO: 0.02 K/UL (ref 0–0.11)
IMM GRANULOCYTES NFR BLD AUTO: 0.3 % (ref 0–0.9)
LYMPHOCYTES # BLD AUTO: 2.29 K/UL (ref 1–4.8)
LYMPHOCYTES NFR BLD: 31.5 % (ref 22–41)
MCH RBC QN AUTO: 28.9 PG (ref 27–33)
MCHC RBC AUTO-ENTMCNC: 31.8 G/DL (ref 33.6–35)
MCV RBC AUTO: 90.9 FL (ref 81.4–97.8)
MONOCYTES # BLD AUTO: 0.44 K/UL (ref 0–0.85)
MONOCYTES NFR BLD AUTO: 6 % (ref 0–13.4)
NEUTROPHILS # BLD AUTO: 4.4 K/UL (ref 2–7.15)
NEUTROPHILS NFR BLD: 60.5 % (ref 44–72)
NRBC # BLD AUTO: 0 K/UL
NRBC BLD-RTO: 0 /100 WBC
NT-PROBNP SERPL IA-MCNC: 42 PG/ML (ref 0–125)
PLATELET # BLD AUTO: 312 K/UL (ref 164–446)
PMV BLD AUTO: 10.6 FL (ref 9–12.9)
POTASSIUM SERPL-SCNC: 3.7 MMOL/L (ref 3.6–5.5)
PROT SERPL-MCNC: 7.8 G/DL (ref 6–8.2)
RBC # BLD AUTO: 4.94 M/UL (ref 4.2–5.4)
SODIUM SERPL-SCNC: 144 MMOL/L (ref 135–145)
WBC # BLD AUTO: 7.3 K/UL (ref 4.8–10.8)

## 2022-08-26 PROCEDURE — 80053 COMPREHEN METABOLIC PANEL: CPT

## 2022-08-26 PROCEDURE — 85025 COMPLETE CBC W/AUTO DIFF WBC: CPT

## 2022-08-26 PROCEDURE — 85652 RBC SED RATE AUTOMATED: CPT

## 2022-08-26 PROCEDURE — 73620 X-RAY EXAM OF FOOT: CPT | Mod: RT

## 2022-08-26 PROCEDURE — 85379 FIBRIN DEGRADATION QUANT: CPT

## 2022-08-26 PROCEDURE — 86140 C-REACTIVE PROTEIN: CPT

## 2022-08-26 PROCEDURE — 99214 OFFICE O/P EST MOD 30 MIN: CPT | Performed by: FAMILY MEDICINE

## 2022-08-26 PROCEDURE — 36415 COLL VENOUS BLD VENIPUNCTURE: CPT

## 2022-08-26 PROCEDURE — 99212 OFFICE O/P EST SF 10 MIN: CPT

## 2022-08-26 PROCEDURE — 83880 ASSAY OF NATRIURETIC PEPTIDE: CPT

## 2022-08-26 RX ORDER — CEPHALEXIN 500 MG/1
500 CAPSULE ORAL 3 TIMES DAILY
Qty: 21 CAPSULE | Refills: 0 | Status: SHIPPED | OUTPATIENT
Start: 2022-08-26 | End: 2022-09-02

## 2022-08-26 ASSESSMENT — FIBROSIS 4 INDEX: FIB4 SCORE: 1.16

## 2022-08-26 ASSESSMENT — ENCOUNTER SYMPTOMS
PALPITATIONS: 0
WEAKNESS: 0
CHILLS: 0
MYALGIAS: 0
BRUISES/BLEEDS EASILY: 0
COUGH: 0
ORTHOPNEA: 0
FEVER: 0
CLAUDICATION: 0
NAUSEA: 0

## 2022-08-26 NOTE — PROGRESS NOTES
FOLLOW UP VASCULAR VISIT  Donna Palmer is a  female  who presents 24 for   Chief Complaint   Patient presents with    Follow-Up     initially referred by Roman Walton (FNP, PCP) for eval and med mgmt of CVI     Subjective    Last seen 2021     CVI:   Interval hx/concerns: new onset of R foot erythema with scaling and swelling over last several weeks.  No recent fever.  Denies foot/ankle injury.  No other more proximal issues reported     HTN:  Current HTN concerns: no current meds   Home BP los/80s   Adherence to current HTN meds: n/a     Hyperlipidemia:    No prior dx or meds  Never had labs     Antiplatelet/anticoagulation: no    Sleeping disorder/FRANK:   Reports heavy snoring per , gets adequate sleep  No PSG  No problems with work day somnolence.        Current Outpatient Medications:     cephALEXin, 500 mg, Oral, TID    Multiple Vitamins-Minerals (MULTIVITAMIN ADULT PO), Take  by mouth., Taking      Social History     Tobacco Use    Smoking status: Never    Smokeless tobacco: Never   Substance Use Topics    Alcohol use: No    Drug use: No     DIET AND EXERCISE:  Weight Change: stable   BMI Readings from Last 5 Encounters:   22 34.33 kg/m²   21 36.39 kg/m²   21 36.39 kg/m²   20 36.39 kg/m²   10/07/20 37.55 kg/m²      Diet: common adult  Exercise: no regular exercise program     Review of Systems   Constitutional:  Negative for chills and fever.   Respiratory:  Negative for cough.    Cardiovascular:  Positive for leg swelling. Negative for chest pain, palpitations, orthopnea and claudication.   Gastrointestinal:  Negative for nausea.   Musculoskeletal:  Negative for myalgias.   Neurological:  Negative for weakness.   Endo/Heme/Allergies:  Does not bruise/bleed easily.     Objective      Vitals:    22 0833 22 0836   BP: (!) 144/77 (!) 140/83   BP Location: Left arm Right arm   Patient Position: Sitting Sitting   BP Cuff Size: Large adult Large adult   Pulse:  "82 84   Weight: 90.7 kg (200 lb)    Height: 1.626 m (5' 4\")         BP Readings from Last 5 Encounters:   08/26/22 (!) 140/83   02/25/21 (!) 165/73   01/07/21 143/71   11/23/20 145/95   10/07/20 156/84      Body mass index is 34.33 kg/m².  Physical Exam  Vitals reviewed.   Constitutional:       General: She is not in acute distress.     Appearance: Normal appearance.   HENT:      Head: Normocephalic and atraumatic.   Neck:      Thyroid: No thyroid mass.      Vascular: Normal carotid pulses.      Trachea: Trachea normal.   Cardiovascular:      Rate and Rhythm: Normal rate and regular rhythm.      Chest Wall: PMI is not displaced.      Pulses: Normal pulses.           Carotid pulses are 2+ on the right side and 2+ on the left side.       Radial pulses are 2+ on the right side and 2+ on the left side.        Dorsalis pedis pulses are 2+ on the right side and 2+ on the left side.        Posterior tibial pulses are 2+ on the right side and 2+ on the left side.      Heart sounds: Normal heart sounds.   Pulmonary:      Effort: Pulmonary effort is normal.      Breath sounds: Normal breath sounds.   Musculoskeletal:      Cervical back: Full passive range of motion without pain.      Right lower leg: No edema.      Left lower leg: No edema.   Skin:     General: Skin is warm and dry.      Capillary Refill: Capillary refill takes less than 2 seconds.      Coloration: Skin is not cyanotic.      Nails: There is no clubbing.   Neurological:      General: No focal deficit present.      Mental Status: She is alert and oriented to person, place, and time. Mental status is at baseline.      Cranial Nerves: No cranial nerve deficit.      Coordination: Coordination is intact. Coordination normal.      Gait: Gait is intact. Gait normal.   Psychiatric:         Mood and Affect: Mood normal.         Behavior: Behavior normal.                  Lab Results   Component Value Date    SODIUM 144 08/26/2022    POTASSIUM 3.7 08/26/2022    CHLORIDE " 108 08/26/2022    CO2 20 08/26/2022    GLUCOSE 97 08/26/2022    BUN 17 08/26/2022    CREATININE 0.56 08/26/2022    IFAFRICA >60 12/07/2020    IFNOTAFR >60 12/07/2020        Lab Results   Component Value Date    WBC 7.3 08/26/2022    RBC 4.94 08/26/2022    HEMOGLOBIN 14.3 08/26/2022    HEMATOCRIT 44.9 08/26/2022    MCV 90.9 08/26/2022    MCH 28.9 08/26/2022    MCHC 31.8 (L) 08/26/2022    MPV 10.6 08/26/2022     VASCULAR IMAGING:   Last EKG: No results found for this or any previous visit.     MONICA 12/8/2020   Ankle-brachial index is normal.     VR duplex 1/25/21    Right lower extremity - No superficial or deep venous thrombosis. No     reflux of the superficial or deep veins.          Medical Decision Making:  Today's Assessment / Status / Plan:     1. Chronic venous insufficiency  US-EXTREMITY VENOUS LOWER BILAT    Referral to Vascular Medicine      2. Localized edema  US-EXTREMITY VENOUS LOWER BILAT    proBrain Natriuretic Peptide, NT      3. Postpoliomyelitis syndrome        4. Primary hypertension        5. Right foot pain  US-EXTREMITY VENOUS LOWER BILAT    DX-FOOT-2- RIGHT      6. SOB (shortness of breath)  proBrain Natriuretic Peptide, NT      7. Cellulitis and abscess of foot  US-EXTREMITY VENOUS LOWER BILAT    CBC WITH DIFFERENTIAL    D-DIMER    Comp Metabolic Panel    CRP QUANTITIVE (NON-CARDIAC)    Sed Rate    DX-FOOT-2- RIGHT    cephALEXin (KEFLEX) 500 MG Cap      8. Stasis dermatitis of both legs          Patient Type: Primary Prevention    Etiology of Established CVD if Present:     1) Chronic venous insufficiency  CEAP classification: C4aS or C5S / Ep / An / Pn   No signs of active ulcerations but possible healed at LLE   MONICA normal, VR duplex w/o signs of DVT or VR.   Discrepancy between order title (Left VR duplex) and text of report reading Right LE  - repeat VR duplex   - trial of triamcinolone cream for symptomatic care   - continue knee-high compression socks, 20-30mmHg, as much as tolerated    -  "increase walking, avoid prolonged standing   - elevated legs while sitting and sleeping above heart level  - reduce sodium (\"salt\") in diet to less than 2,000mg daily   - continue daily moisturizing lotion (such as Gold Bond Diabetic Foot Cream)  Meds:  - continue horse chestnut seed extract 300mg 2 times daily    Cellulitis, R foot   - check labs, start keflex   - update xrays and VR duplex   - may need further eval with PCP if vasc etiology r/o and pain/swelling persists     Antithrombotic therapy:  Indication: possibly if has prior venous ulcers   - will consider initiation of aspirin     Lipid Management:   Qualifies for Statin Therapy Based on 2018 ACC/AHA Guidelines: unknown  Calculated 10-Year Risk of ASCVD (if LDL <189, no CKD G3b/4/5):  pending   Currently on Statin: No, lipid dc's by lab   Tx goaL: LDL-C <100  At goal? No labs   - needs labs updated   - consider statin therapy     Blood Pressure Management:  ACC/AHA (2017) goal <130/80  Home BP at goal:  no  Office BP at goal:  no   24h ABPM: ordered but never completed   Plan:   Monitoring:   - start/continue home BP monitoring, reviewed correct technique, provide BP log and instructions  - order 24h ABPM:  yes  - monitor lytes/gfr routinely   - contact office if BP consistently >140/>90 to discussion of tx adjustments -Pt not interested in treatment at this time   Medications:  - declines meds at this time , strongly recommended for ARB    Glycemic Status: Normal    LIFESTYLE INTERVENTIONS:    SMOKING:   reports that she has never smoked. She has never used smokeless tobacco.   - continued complete avoidance of all tobacco products     PHYSICAL ACTIVITY: continue healthy activity to improve CV fitness as per limits from postpolio syndrome     WEIGHT MANAGEMENT AND NUTRITION: Mediterranean style dietary approach       Other:     # postpolio syndrome  - limited mobility, use chair exercises     Instructed to follow-up with PCP for remainder of adult " medical needs: yes  We will partner with other providers in the management of established vascular disease and cardiometabolic risk factors.    Studies to Be Obtained:  None   Labs to Be Obtained:     Follow up in:  3-4 weeks     Sree Merlos M.D.  [facility]   [facility]   [phone]

## 2022-09-06 ENCOUNTER — APPOINTMENT (OUTPATIENT)
Dept: RADIOLOGY | Facility: MEDICAL CENTER | Age: 72
End: 2022-09-06
Attending: FAMILY MEDICINE
Payer: MEDICARE

## 2022-09-15 ENCOUNTER — HOSPITAL ENCOUNTER (OUTPATIENT)
Dept: RADIOLOGY | Facility: MEDICAL CENTER | Age: 72
End: 2022-09-15
Attending: FAMILY MEDICINE
Payer: MEDICARE

## 2022-09-15 DIAGNOSIS — M79.671 RIGHT FOOT PAIN: ICD-10-CM

## 2022-09-15 DIAGNOSIS — L02.619 CELLULITIS AND ABSCESS OF FOOT: ICD-10-CM

## 2022-09-15 DIAGNOSIS — L03.119 CELLULITIS AND ABSCESS OF FOOT: ICD-10-CM

## 2022-09-15 DIAGNOSIS — I87.2 CHRONIC VENOUS INSUFFICIENCY: ICD-10-CM

## 2022-09-15 DIAGNOSIS — R60.0 LOCALIZED EDEMA: ICD-10-CM

## 2022-09-15 PROCEDURE — 93970 EXTREMITY STUDY: CPT | Mod: 26 | Performed by: INTERNAL MEDICINE

## 2022-09-15 PROCEDURE — 93970 EXTREMITY STUDY: CPT

## 2022-09-30 ENCOUNTER — OFFICE VISIT (OUTPATIENT)
Dept: VASCULAR LAB | Facility: MEDICAL CENTER | Age: 72
End: 2022-09-30
Attending: FAMILY MEDICINE
Payer: MEDICARE

## 2022-09-30 VITALS
DIASTOLIC BLOOD PRESSURE: 82 MMHG | HEIGHT: 64 IN | BODY MASS INDEX: 34.15 KG/M2 | SYSTOLIC BLOOD PRESSURE: 139 MMHG | HEART RATE: 76 BPM | WEIGHT: 200 LBS

## 2022-09-30 DIAGNOSIS — L03.119 CELLULITIS AND ABSCESS OF FOOT: ICD-10-CM

## 2022-09-30 DIAGNOSIS — M79.671 RIGHT FOOT PAIN: ICD-10-CM

## 2022-09-30 DIAGNOSIS — R60.0 LOCALIZED EDEMA: ICD-10-CM

## 2022-09-30 DIAGNOSIS — I10 PRIMARY HYPERTENSION: ICD-10-CM

## 2022-09-30 DIAGNOSIS — I87.2 STASIS DERMATITIS OF BOTH LEGS: ICD-10-CM

## 2022-09-30 DIAGNOSIS — L02.619 CELLULITIS AND ABSCESS OF FOOT: ICD-10-CM

## 2022-09-30 DIAGNOSIS — I87.2 CHRONIC VENOUS INSUFFICIENCY: ICD-10-CM

## 2022-09-30 DIAGNOSIS — M79.3 LIPODERMATOSCLEROSIS OF BOTH LOWER EXTREMITIES: ICD-10-CM

## 2022-09-30 PROCEDURE — 99212 OFFICE O/P EST SF 10 MIN: CPT

## 2022-09-30 PROCEDURE — 99214 OFFICE O/P EST MOD 30 MIN: CPT | Performed by: FAMILY MEDICINE

## 2022-09-30 RX ORDER — ASPIRIN 81 MG/1
81 TABLET ORAL DAILY
Qty: 30 TABLET | Status: ON HOLD | COMMUNITY
End: 2023-04-04

## 2022-09-30 RX ORDER — HYDROCORTISONE ACETATE 0.5 %
1 CREAM (GRAM) TOPICAL 2 TIMES DAILY
COMMUNITY
End: 2023-02-06

## 2022-09-30 ASSESSMENT — ENCOUNTER SYMPTOMS
MYALGIAS: 0
PALPITATIONS: 0
CLAUDICATION: 0
CHILLS: 0
ORTHOPNEA: 0
COUGH: 0
WEAKNESS: 0
FEVER: 0
BRUISES/BLEEDS EASILY: 0
NAUSEA: 0

## 2022-09-30 ASSESSMENT — FIBROSIS 4 INDEX: FIB4 SCORE: 1.15

## 2022-09-30 NOTE — PROGRESS NOTES
"FOLLOW UP VASCULAR VISIT  Donna Palmer is a  female  who presents 24 for   Chief Complaint   Patient presents with    Chronic Venous Insufficiency     initially referred by Roman Walton (FNP, PCP) for eval and med mgmt of CVI     Subjective      CVI:   Interval hx/concerns: had labs, xr, duplex.  Completed keflex.   Noted to have mild high D-dimer, neg ESR, slight high CRP and wbc wnl.  BNP wnl.   Still having R foot swelling.  No other worsening skin changes     HTN:  Current HTN concerns: no current meds   Home BP los/80s   Adherence to current HTN meds: n/a     Hyperlipidemia:    No prior dx or meds  Never had labs     Antiplatelet/anticoagulation: no    Sleeping disorder/FRANK:   Reports heavy snoring per , gets adequate sleep  No PSG  No problems with work day somnolence.        Current Outpatient Medications:     [place], 1 Capsule, Oral, BID    aspirin, 81 mg, Oral, DAILY    Multiple Vitamins-Minerals (MULTIVITAMIN ADULT PO), Take  by mouth.      Social History     Tobacco Use    Smoking status: Never    Smokeless tobacco: Never   Substance Use Topics    Alcohol use: No    Drug use: No     DIET AND EXERCISE:  Weight Change: stable   BMI Readings from Last 5 Encounters:   22 34.33 kg/m²   22 34.33 kg/m²   21 36.39 kg/m²   21 36.39 kg/m²   20 36.39 kg/m²      Diet: common adult  Exercise: no regular exercise program     Review of Systems   Constitutional:  Negative for chills and fever.   Respiratory:  Negative for cough.    Cardiovascular:  Positive for leg swelling. Negative for chest pain, palpitations, orthopnea and claudication.   Gastrointestinal:  Negative for nausea.   Musculoskeletal:  Negative for myalgias.   Neurological:  Negative for weakness.   Endo/Heme/Allergies:  Does not bruise/bleed easily.     Objective      Vitals:    22 0755 22 0757   BP: 138/81 139/82   Pulse: 80 76   Weight: 90.7 kg (200 lb)    Height: 1.626 m (5' 4\")  "        BP Readings from Last 5 Encounters:   22 139/82   22 (!) 140/83   21 (!) 165/73   21 143/71   20 145/95      Body mass index is 34.33 kg/m².  Physical Exam  Vitals reviewed.   Constitutional:       General: She is not in acute distress.     Appearance: Normal appearance.   HENT:      Head: Normocephalic and atraumatic.   Neck:      Thyroid: No thyroid mass.      Vascular: Normal carotid pulses.      Trachea: Trachea normal.   Cardiovascular:      Rate and Rhythm: Normal rate and regular rhythm.      Chest Wall: PMI is not displaced.      Pulses: Normal pulses.           Dorsalis pedis pulses are 2+ on the right side and 2+ on the left side.        Posterior tibial pulses are 2+ on the right side and 2+ on the left side.      Heart sounds: Normal heart sounds.      Comments: Calf lipodermatoscler changes.  Healed bilat medial leg VLUs, nontender.  Mild rubor at dorsum of R foot  Pulmonary:      Effort: Pulmonary effort is normal.      Breath sounds: Normal breath sounds.   Musculoskeletal:      Cervical back: Full passive range of motion without pain.      Right lower le+ Pitting Edema present.      Left lower leg: No edema.   Skin:     General: Skin is warm and dry.      Capillary Refill: Capillary refill takes less than 2 seconds.      Coloration: Skin is not cyanotic.      Nails: There is no clubbing.   Neurological:      General: No focal deficit present.      Mental Status: She is alert and oriented to person, place, and time. Mental status is at baseline.      Cranial Nerves: No cranial nerve deficit.      Coordination: Coordination is intact. Coordination normal.      Gait: Gait is intact. Gait normal.   Psychiatric:         Mood and Affect: Mood normal.         Behavior: Behavior normal.                  Lab Results   Component Value Date    SODIUM 144 2022    POTASSIUM 3.7 2022    CHLORIDE 108 2022    CO2 20 2022    GLUCOSE 97 2022    BUN  17 08/26/2022    CREATININE 0.56 08/26/2022    IFAFRICA >60 12/07/2020    IFNOTAFR >60 12/07/2020        Lab Results   Component Value Date    WBC 7.3 08/26/2022    RBC 4.94 08/26/2022    HEMOGLOBIN 14.3 08/26/2022    HEMATOCRIT 44.9 08/26/2022    MCV 90.9 08/26/2022    MCH 28.9 08/26/2022    MCHC 31.8 (L) 08/26/2022    MPV 10.6 08/26/2022     VASCULAR IMAGING:   Last EKG: No results found for this or any previous visit.     MONICA 12/8/2020   Ankle-brachial index is normal.     VR duplex 1/25/21    Right lower extremity - No superficial or deep venous thrombosis. No     reflux of the superficial or deep veins.    R Foot XR 8/2022  Soft tissue swelling. No acute fracture identified.    BLE VR duplex 9/15/22    CONCLUSIONS   Bilateral lower extremities no superficial or deep venous thrombosis.    Right lower extremity superficial venous reflux as described. No    significant deep venous reflux.    Left lower extremity no significant reflux of the superficial or deep    veins.   No prior bilateral lower extremity study.    Right lower extremity.    All veins demonstrate complete color filling and compressibility with    normal venous flow dynamics including spontaneous flow and respiratory    phasicity. No superficial or deep venous thrombosis.    No significant deep venous reflux.    The great saphenous vein demonstrates significant reflux in 1_ out of the 8    evaluated segments.      Left lower extremity.    All veins demonstrate complete color filling and compressibility with    normal venous flow dynamics including spontaneous flow and respiratory    phasicity. No superficial or deep venous thrombosis.    The left anterior accessory saphenous vein is not well visualized.    No significant reflux of the superficial or deep veins.               Medical Decision Making:  Today's Assessment / Status / Plan:     1. Chronic venous insufficiency  Referral to Vascular Surgery      2. Cellulitis and abscess of foot        3.  "Right foot pain        4. Stasis dermatitis of both legs  Referral to Vascular Surgery      5. Localized edema        6. Primary hypertension        7. Lipodermatosclerosis of both lower extremities  Referral to Vascular Surgery          Patient Type: Primary Prevention    Etiology of Established CVD if Present:     1) Chronic venous insufficiency  RLE CEAP class:  C4aS / Ep / As / Pr   LLE CEAP classification: C4aS or C5S / Ep / An / Pn  No signs of active ulcerations but possible healed at LLE   MONICA normal,   VR duplex 9/2022 - with R GSV reflux, no signs DVT despite mild high D-dimer   - trial of triamcinolone cream for symptomatic care   - continue knee-high compression socks, 20-30mmHg, as much as tolerated    - ref to [facility] to assess for procedural care options   - increase walking, avoid prolonged standing   - elevated legs while sitting and sleeping above heart level  - reduce sodium (\"salt\") in diet to less than 2,000mg daily   - continue daily moisturizing lotion (such as [place] Bond Diabetic Foot Cream)  Meds:  - start horse chestnut seed extract 300mg 2 times daily    Cellulitis, R foot - stable, resolved   - nl xrays, CBC  - may need further eval with PCP if vasc etiology r/o and pain/swelling persists     Antithrombotic therapy:  Indication: possibly if has prior venous ulcers   - start ASA 81mg daily   - consider trental if new VLUs form     Lipid Management:   Qualifies for Statin Therapy Based on 2018 ACC/AHA Guidelines: unknown  Calculated 10-Year Risk of ASCVD (if LDL <189, no CKD G3b/4/5):  pending   Currently on Statin: No, lipid dc's by lab   Tx goaL: LDL-C <100  At goal? No labs   - needs labs updated   - consider statin therapy     Blood Pressure Management:  ACC/AHA (2017) goal <130/80  Home BP at goal:  yes   Office BP at goal:  mild high   24h ABPM: ordered but never completed   Plan:   Monitoring:   - start/continue home BP monitoring, reviewed correct technique, provide BP log and " instructions  - order 24h ABPM:  yes  - monitor lytes/gfr routinely   - contact office if BP consistently >140/>90 to discussion of tx adjustments -Pt not interested in treatment at this time   Medications:  - declines meds at this time , strongly recommended for ARB    Glycemic Status: Normal    LIFESTYLE INTERVENTIONS:    SMOKING:   reports that she has never smoked. She has never used smokeless tobacco.   - continued complete avoidance of all tobacco products     PHYSICAL ACTIVITY: continue healthy activity to improve CV fitness as per limits from postpolio syndrome     WEIGHT MANAGEMENT AND NUTRITION: Mediterranean style dietary approach       Other:     # postpolio syndrome- limited mobility, use chair exercises     Instructed to follow-up with PCP for remainder of adult medical needs: yes  We will partner with other providers in the management of established vascular disease and cardiometabolic risk factors.    Studies to Be Obtained:  None   Labs to Be Obtained: none     Follow up in: 3mo    Sree Merlos M.D.  [facility]   [facility]   [phone]

## 2022-11-11 ENCOUNTER — PATIENT MESSAGE (OUTPATIENT)
Dept: HEALTH INFORMATION MANAGEMENT | Facility: OTHER | Age: 72
End: 2022-11-11

## 2022-12-30 ENCOUNTER — TELEPHONE (OUTPATIENT)
Dept: HEALTH INFORMATION MANAGEMENT | Facility: OTHER | Age: 72
End: 2022-12-30

## 2022-12-30 NOTE — TELEPHONE ENCOUNTER
Outcome: Left Message for patient to schedule an AWV     Please transfer to [facility] at [phone] when patient returns call.     Attempt # 1  - [place]

## 2023-02-04 SDOH — ECONOMIC STABILITY: TRANSPORTATION INSECURITY
IN THE PAST 12 MONTHS, HAS LACK OF TRANSPORTATION KEPT YOU FROM MEETINGS, WORK, OR FROM GETTING THINGS NEEDED FOR DAILY LIVING?: NO

## 2023-02-04 SDOH — ECONOMIC STABILITY: TRANSPORTATION INSECURITY
IN THE PAST 12 MONTHS, HAS THE LACK OF TRANSPORTATION KEPT YOU FROM MEDICAL APPOINTMENTS OR FROM GETTING MEDICATIONS?: NO

## 2023-02-04 SDOH — ECONOMIC STABILITY: HOUSING INSECURITY
IN THE LAST 12 MONTHS, WAS THERE A TIME WHEN YOU DID NOT HAVE A STEADY PLACE TO SLEEP OR SLEPT IN A SHELTER (INCLUDING NOW)?: NO

## 2023-02-04 SDOH — ECONOMIC STABILITY: INCOME INSECURITY: IN THE LAST 12 MONTHS, WAS THERE A TIME WHEN YOU WERE NOT ABLE TO PAY THE MORTGAGE OR RENT ON TIME?: NO

## 2023-02-04 SDOH — ECONOMIC STABILITY: TRANSPORTATION INSECURITY
IN THE PAST 12 MONTHS, HAS LACK OF RELIABLE TRANSPORTATION KEPT YOU FROM MEDICAL APPOINTMENTS, MEETINGS, WORK OR FROM GETTING THINGS NEEDED FOR DAILY LIVING?: NO

## 2023-02-04 SDOH — ECONOMIC STABILITY: INCOME INSECURITY: HOW HARD IS IT FOR YOU TO PAY FOR THE VERY BASICS LIKE FOOD, HOUSING, MEDICAL CARE, AND HEATING?: NOT HARD AT ALL

## 2023-02-04 SDOH — ECONOMIC STABILITY: FOOD INSECURITY: WITHIN THE PAST 12 MONTHS, YOU WORRIED THAT YOUR FOOD WOULD RUN OUT BEFORE YOU GOT MONEY TO BUY MORE.: NEVER TRUE

## 2023-02-04 SDOH — ECONOMIC STABILITY: FOOD INSECURITY: WITHIN THE PAST 12 MONTHS, THE FOOD YOU BOUGHT JUST DIDN'T LAST AND YOU DIDN'T HAVE MONEY TO GET MORE.: NEVER TRUE

## 2023-02-04 SDOH — HEALTH STABILITY: PHYSICAL HEALTH: ON AVERAGE, HOW MANY DAYS PER WEEK DO YOU ENGAGE IN MODERATE TO STRENUOUS EXERCISE (LIKE A BRISK WALK)?: 0 DAYS

## 2023-02-04 SDOH — HEALTH STABILITY: MENTAL HEALTH
STRESS IS WHEN SOMEONE FEELS TENSE, NERVOUS, ANXIOUS, OR CAN'T SLEEP AT NIGHT BECAUSE THEIR MIND IS TROUBLED. HOW STRESSED ARE YOU?: ONLY A LITTLE

## 2023-02-04 SDOH — ECONOMIC STABILITY: HOUSING INSECURITY: IN THE LAST 12 MONTHS, HOW MANY PLACES HAVE YOU LIVED?: 1

## 2023-02-04 SDOH — HEALTH STABILITY: PHYSICAL HEALTH: ON AVERAGE, HOW MANY MINUTES DO YOU ENGAGE IN EXERCISE AT THIS LEVEL?: 0 MIN

## 2023-02-04 ASSESSMENT — SOCIAL DETERMINANTS OF HEALTH (SDOH)
IN A TYPICAL WEEK, HOW MANY TIMES DO YOU TALK ON THE PHONE WITH FAMILY, FRIENDS, OR NEIGHBORS?: MORE THAN THREE TIMES A WEEK
IN A TYPICAL WEEK, HOW MANY TIMES DO YOU TALK ON THE PHONE WITH FAMILY, FRIENDS, OR NEIGHBORS?: MORE THAN THREE TIMES A WEEK
HOW OFTEN DO YOU GET TOGETHER WITH FRIENDS OR RELATIVES?: PATIENT DECLINED
WITHIN THE PAST 12 MONTHS, YOU WORRIED THAT YOUR FOOD WOULD RUN OUT BEFORE YOU GOT THE MONEY TO BUY MORE: NEVER TRUE
HOW OFTEN DO YOU ATTEND CHURCH OR RELIGIOUS SERVICES?: MORE THAN 4 TIMES PER YEAR
HOW MANY DRINKS CONTAINING ALCOHOL DO YOU HAVE ON A TYPICAL DAY WHEN YOU ARE DRINKING: PATIENT DOES NOT DRINK
HOW HARD IS IT FOR YOU TO PAY FOR THE VERY BASICS LIKE FOOD, HOUSING, MEDICAL CARE, AND HEATING?: NOT HARD AT ALL
HOW OFTEN DO YOU ATTENT MEETINGS OF THE CLUB OR ORGANIZATION YOU BELONG TO?: NEVER
HOW OFTEN DO YOU ATTENT MEETINGS OF THE CLUB OR ORGANIZATION YOU BELONG TO?: NEVER
DO YOU BELONG TO ANY CLUBS OR ORGANIZATIONS SUCH AS CHURCH GROUPS UNIONS, FRATERNAL OR ATHLETIC GROUPS, OR SCHOOL GROUPS?: NO
HOW OFTEN DO YOU ATTEND CHURCH OR RELIGIOUS SERVICES?: MORE THAN 4 TIMES PER YEAR
HOW OFTEN DO YOU HAVE A DRINK CONTAINING ALCOHOL: NEVER
HOW OFTEN DO YOU HAVE SIX OR MORE DRINKS ON ONE OCCASION: NEVER
DO YOU BELONG TO ANY CLUBS OR ORGANIZATIONS SUCH AS CHURCH GROUPS UNIONS, FRATERNAL OR ATHLETIC GROUPS, OR SCHOOL GROUPS?: NO
HOW OFTEN DO YOU GET TOGETHER WITH FRIENDS OR RELATIVES?: PATIENT DECLINED

## 2023-02-04 ASSESSMENT — LIFESTYLE VARIABLES
AUDIT-C TOTAL SCORE: 0
SKIP TO QUESTIONS 9-10: 1
HOW OFTEN DO YOU HAVE A DRINK CONTAINING ALCOHOL: NEVER
HOW OFTEN DO YOU HAVE SIX OR MORE DRINKS ON ONE OCCASION: NEVER
HOW MANY STANDARD DRINKS CONTAINING ALCOHOL DO YOU HAVE ON A TYPICAL DAY: PATIENT DOES NOT DRINK

## 2023-02-06 ENCOUNTER — OFFICE VISIT (OUTPATIENT)
Dept: MEDICAL GROUP | Facility: PHYSICIAN GROUP | Age: 73
End: 2023-02-06
Payer: MEDICARE

## 2023-02-06 DIAGNOSIS — G14 POSTPOLIOMYELITIS SYNDROME: ICD-10-CM

## 2023-02-06 DIAGNOSIS — I49.8 OTHER CARDIAC ARRHYTHMIA: ICD-10-CM

## 2023-02-06 DIAGNOSIS — Z00.00 WELLNESS EXAMINATION: ICD-10-CM

## 2023-02-06 DIAGNOSIS — I87.2 STASIS DERMATITIS OF BOTH LEGS: ICD-10-CM

## 2023-02-06 DIAGNOSIS — Z13.6 SCREENING FOR CARDIOVASCULAR CONDITION: ICD-10-CM

## 2023-02-06 PROBLEM — I49.9 ARRHYTHMIA: Status: ACTIVE | Noted: 2023-02-06

## 2023-02-06 PROCEDURE — 99204 OFFICE O/P NEW MOD 45 MIN: CPT

## 2023-02-06 ASSESSMENT — ENCOUNTER SYMPTOMS
WEAKNESS: 1
PALPITATIONS: 0
TREMORS: 1
SHORTNESS OF BREATH: 1

## 2023-02-06 ASSESSMENT — PATIENT HEALTH QUESTIONNAIRE - PHQ9: CLINICAL INTERPRETATION OF PHQ2 SCORE: 0

## 2023-02-06 NOTE — ASSESSMENT & PLAN NOTE
Chronic, unstable. Walking, balance, movement impaired progressively. Foot drop. Uses scooter. Still cooks, transfers with assist, holding on to rails.

## 2023-02-06 NOTE — PROGRESS NOTES
Subjective:     CC: Pt presents today to establish care with me, recently relocated to [place] from [facility].     HPI:   Donna presents today with    Problem   Postpoliomyelitis Syndrome   Stasis Dermatitis of Both Legs       Health Maintenance: Completed  Cancer screening:   Colorectal cancer screening: attempted colonoscopy January 2023, had irregular rhythm, procedure was cancelled.     Infectious disease screening/Immunizaitons  -STI screening: declines  Practices safe sex.  -HIV Screening: declines  -Immunizaitons:   Influenza: declines  Tetanus: up-to-date: due 4/27/2025  Anticipatory Guidance:  Diet: Breakfast: Cereal, and soy milk, Lunch: stir mina noodles with veggies, Dinner: chicken sandwich  Elicits she is eating a variety of fresh veggies/occasional fruits, eating a variety meats,   2x weekly fast food/junk food  Soda: sharon sweet tea with artificial sweetener; Water: not much  Exercise: recommended 150 minutes/week; disabled, moves as able.   Substance Abuse: no  Safe in relationship. Yes/  Seat belts, bike helmet, gun safety discussed.  Sun protection used.    ROS:  Review of Systems   Constitutional:  Positive for malaise/fatigue (with exertion).   Respiratory:  Positive for shortness of breath (with exertion).    Cardiovascular:  Negative for chest pain and palpitations.   Neurological:  Positive for tremors (right hand with exertion/fatigue tremmor) and weakness.   All other systems reviewed and are negative.    Objective:     Exam:  BP (P) 130/88 (BP Location: Right arm, Patient Position: Sitting, BP Cuff Size: Large adult)   Pulse (P) 80   Temp (P) 37.2 °C (99 °F) (Temporal)   SpO2 (P) 95%  There is no height or weight on file to calculate BMI.    Physical Exam  Vitals reviewed.   Constitutional:       General: She is not in acute distress.     Appearance: She is obese.   HENT:      Right Ear: Ear canal and external ear normal. There is impacted cerumen.      Left Ear: Tympanic membrane, ear  canal and external ear normal.   Eyes:      Extraocular Movements: Extraocular movements intact.      Conjunctiva/sclera: Conjunctivae normal.      Pupils: Pupils are equal, round, and reactive to light.   Cardiovascular:      Rate and Rhythm: Normal rate and regular rhythm.      Pulses: Normal pulses.   Pulmonary:      Effort: Pulmonary effort is normal. No respiratory distress.      Breath sounds: Normal breath sounds.   Abdominal:      General: Bowel sounds are normal.   Skin:     General: Skin is warm and dry.      Capillary Refill: Capillary refill takes less than 2 seconds.   Neurological:      General: No focal deficit present.      Mental Status: She is alert and oriented to person, place, and time.      Cranial Nerves: No cranial nerve deficit.      Sensory: No sensory deficit.      Motor: Weakness present.      Coordination: Coordination abnormal.      Gait: Gait abnormal.      Comments: Wheel chair, contracture to left hand, foot drop right foot with brace., unable to lift right arm   Psychiatric:         Mood and Affect: Mood normal.         Behavior: Behavior normal.       Labs:    Latest Reference Range & Units 08/26/22 10:08   WBC 4.8 - 10.8 K/uL 7.3   RBC 4.20 - 5.40 M/uL 4.94   Hemoglobin 12.0 - 16.0 g/dL 14.3   Hematocrit 37.0 - 47.0 % 44.9   MCV 81.4 - 97.8 fL 90.9   MCH 27.0 - 33.0 pg 28.9   MCHC 33.6 - 35.0 g/dL 31.8 (L)   RDW 35.9 - 50.0 fL 45.9   Platelet Count 164 - 446 K/uL 312   MPV 9.0 - 12.9 fL 10.6   Neutrophils-Polys 44.00 - 72.00 % 60.50   Neutrophils (Absolute) 2.00 - 7.15 K/uL 4.40   Lymphocytes 22.00 - 41.00 % 31.50   Lymphs (Absolute) 1.00 - 4.80 K/uL 2.29   Monocytes 0.00 - 13.40 % 6.00   Monos (Absolute) 0.00 - 0.85 K/uL 0.44   Eosinophils 0.00 - 6.90 % 1.20   Eos (Absolute) 0.00 - 0.51 K/uL 0.09   Basophils 0.00 - 1.80 % 0.50   Baso (Absolute) 0.00 - 0.12 K/uL 0.04   Immature Granulocytes 0.00 - 0.90 % 0.30   Immature Granulocytes (abs) 0.00 - 0.11 K/uL 0.02   Nucleated RBC /100  WBC 0.00   NRBC (Absolute) K/uL 0.00   Sed Rate Westergren 0 - 25 mm/hour 8   Sodium 135 - 145 mmol/L 144   Potassium 3.6 - 5.5 mmol/L 3.7   Chloride 96 - 112 mmol/L 108   Co2 20 - 33 mmol/L 20   Anion Gap 7.0 - 16.0  16.0   Glucose 65 - 99 mg/dL 97   Bun 8 - 22 mg/dL 17   Creatinine 0.50 - 1.40 mg/dL 0.56   GFR (CKD-EPI) >60 mL/min/1.73 m 2 98   Calcium 8.5 - 10.5 mg/dL 9.6   AST(SGOT) 12 - 45 U/L 22   ALT(SGPT) 2 - 50 U/L 18   Alkaline Phosphatase 30 - 99 U/L 70   Total Bilirubin 0.1 - 1.5 mg/dL 0.3   Albumin 3.2 - 4.9 g/dL 4.6   Total Protein 6.0 - 8.2 g/dL 7.8   Globulin 1.9 - 3.5 g/dL 3.2   A-G Ratio g/dL 1.4   NT-proBNP 0 - 125 pg/mL 42   D-Dimer Screen 0.00 - 0.50 ug/mL (FEU) 0.73 (H)   Stat C-Reactive Protein 0.00 - 0.75 mg/dL 0.98 (H)   (L): Data is abnormally low  (H): Data is abnormally high    Assessment & Plan:     72 y.o. female with the following -     Problem List Items Addressed This Visit       Stasis dermatitis of both legs     Chronic, stable. Managed by Dr. Merlos. Continue asa 81 mg dialy         Postpoliomyelitis syndrome     Chronic, unstable. Walking, balance, movement impaired progressively. Foot drop. Uses scooter. Still cooks, transfers with assist, holding on to rails.           Other Visit Diagnoses       Wellness examination        Screening for cardiovascular condition        Relevant Orders    COLOGUARD (FIT DNA)            I have placed the below orders and discussed them with an approved delegating provider.  The MA is performing the below orders under the direction of Dr. Villarreal.    I spent a total of 38 minutes with record review, exam, communication with the patient, communication with other providers, and documentation of this encounter.    Return in about 6 months (around 8/6/2023) for Medicare Wellness.    Please note that this dictation was created using voice recognition software. I have made every reasonable attempt to correct obvious errors, but I expect that there are  errors of grammar and possibly content that I did not discover before finalizing the note.

## 2023-02-06 NOTE — ASSESSMENT & PLAN NOTE
Arrhythmia identified prior to screening colonoscopy, procedure not performed. Asymptomatic, declines further testing for this

## 2023-02-22 DIAGNOSIS — N63.20 MASS OF LEFT BREAST, UNSPECIFIED QUADRANT: ICD-10-CM

## 2023-02-22 DIAGNOSIS — Z12.31 ENCOUNTER FOR SCREENING MAMMOGRAM FOR MALIGNANT NEOPLASM OF BREAST: ICD-10-CM

## 2023-02-22 DIAGNOSIS — N64.59 OTHER SIGNS AND SYMPTOMS IN BREAST: ICD-10-CM

## 2023-02-28 ENCOUNTER — HOSPITAL ENCOUNTER (OUTPATIENT)
Dept: RADIOLOGY | Facility: MEDICAL CENTER | Age: 73
End: 2023-02-28
Payer: MEDICARE

## 2023-03-06 ENCOUNTER — HOSPITAL ENCOUNTER (OUTPATIENT)
Dept: RADIOLOGY | Facility: MEDICAL CENTER | Age: 73
End: 2023-03-06
Payer: MEDICARE

## 2023-03-06 DIAGNOSIS — Z12.31 ENCOUNTER FOR SCREENING MAMMOGRAM FOR MALIGNANT NEOPLASM OF BREAST: ICD-10-CM

## 2023-03-06 DIAGNOSIS — N64.59 OTHER SIGNS AND SYMPTOMS IN BREAST: ICD-10-CM

## 2023-03-06 DIAGNOSIS — R92.8 ABNORMAL FINDINGS ON DIAGNOSTIC IMAGING OF BREAST: ICD-10-CM

## 2023-03-06 DIAGNOSIS — N63.20 MASS OF LEFT BREAST, UNSPECIFIED QUADRANT: ICD-10-CM

## 2023-03-06 LAB — PATHOLOGY CONSULT NOTE: NORMAL

## 2023-03-06 PROCEDURE — 76642 ULTRASOUND BREAST LIMITED: CPT | Mod: LT

## 2023-03-06 PROCEDURE — 88305 TISSUE EXAM BY PATHOLOGIST: CPT

## 2023-03-06 PROCEDURE — A4648 IMPLANTABLE TISSUE MARKER: HCPCS

## 2023-03-06 PROCEDURE — 88361 TUMOR IMMUNOHISTOCHEM/COMPUT: CPT

## 2023-03-06 PROCEDURE — 19285 PERQ DEV BREAST 1ST US IMAG: CPT | Mod: LT

## 2023-03-06 PROCEDURE — 88360 TUMOR IMMUNOHISTOCHEM/MANUAL: CPT | Mod: 91

## 2023-03-06 PROCEDURE — G0279 TOMOSYNTHESIS, MAMMO: HCPCS

## 2023-03-06 PROCEDURE — 88374 M/PHMTRC ALYS ISHQUANT/SEMIQ: CPT

## 2023-03-07 ENCOUNTER — TELEPHONE (OUTPATIENT)
Dept: RADIOLOGY | Facility: MEDICAL CENTER | Age: 73
End: 2023-03-07
Payer: MEDICARE

## 2023-03-07 ENCOUNTER — TELEPHONE (OUTPATIENT)
Dept: MEDICAL GROUP | Facility: PHYSICIAN GROUP | Age: 73
End: 2023-03-07
Payer: MEDICARE

## 2023-03-07 DIAGNOSIS — N63.20 MASS OF LEFT BREAST, UNSPECIFIED QUADRANT: ICD-10-CM

## 2023-03-07 NOTE — PROGRESS NOTES
Problem List Items Addressed This Visit       Mass of left breast     Chronic, unstable. 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   Ref surgery,  Ref oncology         Relevant Orders    Referral to General Surgery    Referral to Hematology Oncology

## 2023-03-27 ENCOUNTER — APPOINTMENT (OUTPATIENT)
Dept: ADMISSIONS | Facility: MEDICAL CENTER | Age: 73
End: 2023-03-27
Attending: SURGERY
Payer: MEDICARE

## 2023-03-29 ENCOUNTER — PRE-ADMISSION TESTING (OUTPATIENT)
Dept: ADMISSIONS | Facility: MEDICAL CENTER | Age: 73
End: 2023-03-29
Attending: SURGERY
Payer: MEDICARE

## 2023-03-29 VITALS — HEIGHT: 64 IN | BODY MASS INDEX: 34.15 KG/M2 | WEIGHT: 200 LBS

## 2023-03-29 RX ORDER — CHLORAL HYDRATE 500 MG
1000 CAPSULE ORAL DAILY
Status: ON HOLD | COMMUNITY
End: 2023-04-04

## 2023-03-29 ASSESSMENT — FIBROSIS 4 INDEX: FIB4 SCORE: 1.16

## 2023-03-31 ENCOUNTER — HOSPITAL ENCOUNTER (OUTPATIENT)
Dept: HEMATOLOGY ONCOLOGY | Facility: MEDICAL CENTER | Age: 73
End: 2023-03-31
Attending: INTERNAL MEDICINE
Payer: MEDICARE

## 2023-03-31 ENCOUNTER — APPOINTMENT (OUTPATIENT)
Dept: ADMISSIONS | Facility: MEDICAL CENTER | Age: 73
End: 2023-03-31
Attending: SURGERY
Payer: MEDICARE

## 2023-03-31 VITALS
DIASTOLIC BLOOD PRESSURE: 72 MMHG | RESPIRATION RATE: 16 BRPM | TEMPERATURE: 98.9 F | HEART RATE: 83 BPM | SYSTOLIC BLOOD PRESSURE: 112 MMHG | OXYGEN SATURATION: 95 %

## 2023-03-31 DIAGNOSIS — Z17.0 MALIGNANT NEOPLASM OF UPPER-INNER QUADRANT OF LEFT BREAST IN FEMALE, ESTROGEN RECEPTOR POSITIVE (HCC): ICD-10-CM

## 2023-03-31 DIAGNOSIS — C50.212 MALIGNANT NEOPLASM OF UPPER-INNER QUADRANT OF LEFT BREAST IN FEMALE, ESTROGEN RECEPTOR POSITIVE (HCC): ICD-10-CM

## 2023-03-31 DIAGNOSIS — Z01.812 PRE-OPERATIVE LABORATORY EXAMINATION: ICD-10-CM

## 2023-03-31 LAB
BASOPHILS # BLD AUTO: 0.4 % (ref 0–1.8)
BASOPHILS # BLD: 0.03 K/UL (ref 0–0.12)
EOSINOPHIL # BLD AUTO: 0.08 K/UL (ref 0–0.51)
EOSINOPHIL NFR BLD: 1.1 % (ref 0–6.9)
ERYTHROCYTE [DISTWIDTH] IN BLOOD BY AUTOMATED COUNT: 42.7 FL (ref 35.9–50)
HCT VFR BLD AUTO: 43.2 % (ref 37–47)
HGB BLD-MCNC: 14.1 G/DL (ref 12–16)
IMM GRANULOCYTES # BLD AUTO: 0.02 K/UL (ref 0–0.11)
IMM GRANULOCYTES NFR BLD AUTO: 0.3 % (ref 0–0.9)
LYMPHOCYTES # BLD AUTO: 2.54 K/UL (ref 1–4.8)
LYMPHOCYTES NFR BLD: 34.3 % (ref 22–41)
MCH RBC QN AUTO: 28.5 PG (ref 27–33)
MCHC RBC AUTO-ENTMCNC: 32.6 G/DL (ref 33.6–35)
MCV RBC AUTO: 87.3 FL (ref 81.4–97.8)
MONOCYTES # BLD AUTO: 0.56 K/UL (ref 0–0.85)
MONOCYTES NFR BLD AUTO: 7.6 % (ref 0–13.4)
NEUTROPHILS # BLD AUTO: 4.18 K/UL (ref 2–7.15)
NEUTROPHILS NFR BLD: 56.3 % (ref 44–72)
NRBC # BLD AUTO: 0 K/UL
NRBC BLD-RTO: 0 /100 WBC
PLATELET # BLD AUTO: 305 K/UL (ref 164–446)
PMV BLD AUTO: 10.3 FL (ref 9–12.9)
RBC # BLD AUTO: 4.95 M/UL (ref 4.2–5.4)
WBC # BLD AUTO: 7.4 K/UL (ref 4.8–10.8)

## 2023-03-31 PROCEDURE — 99212 OFFICE O/P EST SF 10 MIN: CPT | Performed by: INTERNAL MEDICINE

## 2023-03-31 PROCEDURE — 99205 OFFICE O/P NEW HI 60 MIN: CPT | Performed by: INTERNAL MEDICINE

## 2023-03-31 PROCEDURE — 85025 COMPLETE CBC W/AUTO DIFF WBC: CPT

## 2023-03-31 PROCEDURE — 36415 COLL VENOUS BLD VENIPUNCTURE: CPT

## 2023-03-31 ASSESSMENT — ENCOUNTER SYMPTOMS
FOCAL WEAKNESS: 1
WEAKNESS: 1
CONSTITUTIONAL NEGATIVE: 1
EYES NEGATIVE: 1
MUSCULOSKELETAL NEGATIVE: 1
GASTROINTESTINAL NEGATIVE: 1
RESPIRATORY NEGATIVE: 1
CARDIOVASCULAR NEGATIVE: 1
PSYCHIATRIC NEGATIVE: 1

## 2023-03-31 ASSESSMENT — PAIN SCALES - GENERAL: PAINLEVEL: NO PAIN

## 2023-03-31 NOTE — LETTER
[facility]   [address] [place] [address],   [address]  [place] [address]  Phone: [phone]  Fax: [phone]              Donna Palmer  1950    Encounter Date: 3/31/2023    Ludin Zaman M.D.          PROGRESS NOTE:  Consult:  Hematology/Oncology      Referring Physician: Lubna Donaldson MD  Primary Care:  HUAN Gallego.[email or web address]    Diagnosis: Newly diagnosed left breast cancer    Chief Complaint: Newly diagnosed left breast cancer    History of Presenting Illness:  Donna Palmer is a 72 y.o. white female who self detected a mass in her left breast in February 2023.  Has not had a mammogram for several years.  Diagnostic mammogram and ultrasound on 3/6/2023 showed a 2.7 x 2.4 x 2.0 cm spiculated irregular mass at 3 o'clock position of the left breast with no abnormal lymph nodes being detected.  She underwent an immediate ultrasound-guided biopsy demonstrated invasive ductal carcinoma, grade 3, at least 1.7 cm in the core, ER +95%, DE negative, Ki-67 30%, HER2 2+ IHC FISH negative.  She saw Dr. Donaldson in consultation and is planning to undergo partial mastectomy and sentinel lymph node biopsy next week.  Her history is notable for post radial syndrome with motor nerve impairment which is gotten gradually worse over the last couple of years.  She now has to use a scooter almost all the time and has started having impairment in activities of daily living.  Otherwise she has no chronic medical problems.  She has a maternal aunt who had breast cancer.  No other breast or ovarian cancer is noted.      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   • PONV (postoperative nausea and vomiting) May 2010    NAUSEA AND VOMITTING AFTER COLONOSCOPY       Past  Surgical History:   Procedure Laterality Date   • ORIF, ELBOW Right 2008   • OTHER ORTHOPEDIC SURGERY  MAY 2010    FRACTURED RIGHT ELBOW   • PRIMARY C SECTION         Social History     Tobacco Use   • Smoking status: Never   • Smokeless tobacco: Never   • Tobacco comments:     Never smoked tobacco   Vaping Use   • Vaping Use: Never used   Substance Use Topics   • Alcohol use: No   • Drug use: No        Family History   Problem Relation Age of Onset   • Kidney Disease Father          93   • No Known Problems Brother    • Breast Cancer Maternal Aunt    • Cancer Maternal Aunt         PASSED AWAY FROM BREAST CANCER   • Cancer Maternal Aunt         BREAST CANCER - IN REMISSION   • Breast Cancer Maternal Aunt    • Clotting Disorder Neg Hx    • Stroke Neg Hx    • Heart Attack Neg Hx    • Tubal Cancer Neg Hx    • Ovarian Cancer Neg Hx    • Peritoneal Cancer Neg Hx    • Colorectal Cancer Neg Hx    • Diabetes Neg Hx    • Heart Disease Neg Hx    • Hypertension Neg Hx    • Hyperlipidemia Neg Hx        Allergies as of 2023 - Reviewed 2023   Allergen Reaction Noted   • Penicillin g Hives 04/10/2013   • Tetracycline Vomiting 04/10/2013         Current Outpatient Medications:   •  GARLIC PO, Take  by mouth., Disp: , Rfl:   •  NIACIN PO, Take  by mouth., Disp: , Rfl:   •  Omega-3 Fatty Acids (FISH OIL) 1000 MG Cap capsule, Take 1,000 mg by mouth every day., Disp: , Rfl:   •  aspirin 81 MG EC tablet, Take 1 Tablet by mouth every day., Disp: 30 Tablet, Rfl:   •  Multiple Vitamins-Minerals (MULTIVITAMIN ADULT PO), Take  by mouth., Disp: , Rfl:     Review of Systems:  Review of Systems   Constitutional: Negative.    HENT: Negative.     Eyes: Negative.    Respiratory: Negative.     Cardiovascular: Negative.    Gastrointestinal: Negative.    Genitourinary: Negative.    Musculoskeletal: Negative.    Skin: Negative.    Neurological:  Positive for focal weakness and weakness.   Endo/Heme/Allergies: Negative.     Psychiatric/Behavioral: Negative.          Physical Exam:  There were no vitals filed for this visit.    DESC; KARNOFSKY SCALE WITH ECOG EQUIVALENT: 70, Cares for self; unable to carry on normal activity or to do active work (ECOG equivalent 1)    DISTRESS LEVEL: no acute distress    Physical Exam  Constitutional:       Appearance: Normal appearance.   HENT:      Head: Normocephalic.      Mouth/Throat:      Mouth: Mucous membranes are moist.      Pharynx: Oropharynx is clear.   Eyes:      Extraocular Movements: Extraocular movements intact.      Conjunctiva/sclera: Conjunctivae normal.      Pupils: Pupils are equal, round, and reactive to light.   Cardiovascular:      Rate and Rhythm: Normal rate and regular rhythm.      Pulses: Normal pulses.   Pulmonary:      Effort: Pulmonary effort is normal.      Breath sounds: Normal breath sounds.   Chest:      Comments: Left breast shows a 3 x 3 cm mass at 230 o'clock 7 cm from the nipple which is firm to hard.  No left axillary or supraclavicular adenopathy is noted.  The right breast is normal.  Abdominal:      General: Abdomen is flat. Bowel sounds are normal.      Palpations: Abdomen is soft. There is no mass.   Musculoskeletal:         General: Normal range of motion.   Skin:     General: Skin is warm.   Neurological:      Mental Status: She is alert and oriented to person, place, and time.      Motor: Weakness present.      Deep Tendon Reflexes:      Reflex Scores:       Tricep reflexes are 0 on the right side.       Bicep reflexes are 0 on the right side.       Patellar reflexes are 0 on the right side.       Achilles reflexes are 0 on the right side.     Comments: Severe right-sided weakness is noted secondary to postpolio syndrome   Psychiatric:         Mood and Affect: Mood normal.         Behavior: Behavior normal.        Labs:  Hospital Outpatient Visit on 03/06/2023   Component Date Value Ref Range Status   • Pathology Request 03/06/2023 Sent to [facility]   Final        Imaging:   All listed images below have been independently reviewed by me. I agree with the findings as summarized below:      US-BREAST LIMITED-LEFT    Result Date: 3/6/2023  3/6/2023 10:50 AM HISTORY/REASON FOR EXAM: Palpable lump TECHNIQUE/EXAM DESCRIPTION AND NUMBER OF VIEWS: Bilateral tomosynthesis diagnostic mammography was performed with standard mammographic images generated from the data set. Images were reviewed and interpreted with CAD. Left breast ultrasound was then performed COMPARISON: 6/10/2015 FINDINGS: There are scattered areas of fibroglandular density. There is a new 3 cm spiculated palpable mass with architectural distortion in the upper outer left breast 7 cm from the nipple. No axillary adenopathy is seen. Right breast has no interval change with some punctate and coarse calcifications again noted. Left breast ultrasound at the palpable area in question, 3:00, 6 cm in the nipple confirms a hypoechoic spiculated irregular mass with abundant vascularity. The mass measures 27 x 24 x 20 mm. Review of the axilla shows no enlarged lymph node     Palpable 3:00 2.7 cm left breast mass is highly worrisome and ultrasound-guided biopsy with Fiona reflector placement is recommended No evidence of lymphadenopathy Right breast benign findings These findings were discussed with the patient and all questions were addressed.   She is meeting with our biopsy coordinator at this time. R5- CATEGORY 5:  HIGHLY SUGGESTIVE OF MALIGNANCY - APPROPRIATE ACTION SHOULD BE TAKEN Ten to twenty percent of all cancers can be categorized as hereditary and the clinical and financial value of identifying patients and families at risk is well documented. If you have a personal or family history of breast, ovarian, fallopian tube, peritoneal or other cancer, please consult your physician regarding genetic counseling and testing.     MA-DIAGNOSTIC MAMMO BILAT W/TOMOSYNTHESIS W/CAD    Result Date: 3/6/2023  3/6/2023 10:50 AM  HISTORY/REASON FOR EXAM: Palpable lump TECHNIQUE/EXAM DESCRIPTION AND NUMBER OF VIEWS: Bilateral tomosynthesis diagnostic mammography was performed with standard mammographic images generated from the data set. Images were reviewed and interpreted with CAD. Left breast ultrasound was then performed COMPARISON: 6/10/2015 FINDINGS: There are scattered areas of fibroglandular density. There is a new 3 cm spiculated palpable mass with architectural distortion in the upper outer left breast 7 cm from the nipple. No axillary adenopathy is seen. Right breast has no interval change with some punctate and coarse calcifications again noted. Left breast ultrasound at the palpable area in question, 3:00, 6 cm in the nipple confirms a hypoechoic spiculated irregular mass with abundant vascularity. The mass measures 27 x 24 x 20 mm. Review of the axilla shows no enlarged lymph node     Palpable 3:00 2.7 cm left breast mass is highly worrisome and ultrasound-guided biopsy with Fiona reflector placement is recommended No evidence of lymphadenopathy Right breast benign findings These findings were discussed with the patient and all questions were addressed.   She is meeting with our biopsy coordinator at this time. R5- CATEGORY 5:  HIGHLY SUGGESTIVE OF MALIGNANCY - APPROPRIATE ACTION SHOULD BE TAKEN Ten to twenty percent of all cancers can be categorized as hereditary and the clinical and financial value of identifying patients and families at risk is well documented. If you have a personal or family history of breast, ovarian, fallopian tube, peritoneal or other cancer, please consult your physician regarding genetic counseling and testing.       Pathology:      Assessment & Plan:    1.  Ductal carcinoma of the left breast, grade 3, clinically stage IIa (cT2, cN0) ER +95%, NH negative, HER2 IHC 2+ FISH negative, Ki-67 30%.  She is scheduled for partial mastectomy next week with sentinel lymph node biopsy.  We are going to obtain an  Oncotype Dx recurrence score on her primary biopsy so we will have this back when she returns in 3 weeks after her surgery.  2.  Postpolio syndrome with significant motor neuropathy and severe weakness on the right side    Plan: We will obtain the Oncotype and see her back in 3 weeks to review the pathologic findings from the surgery as well.  Any questions and concerns raised by the patient were answered to the best of my ability. Thank you for allowing me to participate in the care for this patient. Please feel free to contact me for any questions or concerns.     Ludin Zaman M.D.          Lubna Donaldson M.D.  [address] [place] [id number]  Via Fax: [phone]

## 2023-03-31 NOTE — PROGRESS NOTES
Consult:  Hematology/Oncology      Referring Physician: Lubna Donaldson MD  Primary Care:  AUGUSTA Gallego    Diagnosis: Newly diagnosed left breast cancer    Chief Complaint: Newly diagnosed left breast cancer    History of Presenting Illness:  Donna Palmer is a 72 y.o. white female who self detected a mass in her left breast in February 2023.  Has not had a mammogram for several years.  Diagnostic mammogram and ultrasound on 3/6/2023 showed a 2.7 x 2.4 x 2.0 cm spiculated irregular mass at 3 o'clock position of the left breast with no abnormal lymph nodes being detected.  She underwent an immediate ultrasound-guided biopsy demonstrated invasive ductal carcinoma, grade 3, at least 1.7 cm in the core, ER +95%, UT negative, Ki-67 30%, HER2 2+ IHC FISH negative.  She saw Dr. Donaldson in consultation and is planning to undergo partial mastectomy and sentinel lymph node biopsy next week.  Her history is notable for post radial syndrome with motor nerve impairment which is gotten gradually worse over the last couple of years.  She now has to use a scooter almost all the time and has started having impairment in activities of daily living.  Otherwise she has no chronic medical problems.  She has a maternal aunt who had breast cancer.  No other breast or ovarian cancer is noted.      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    PONV (postoperative nausea and vomiting) May 2010    NAUSEA AND VOMITTING AFTER COLONOSCOPY       Past Surgical History:   Procedure Laterality Date    ORIF, ELBOW Right 09/29/2008    OTHER ORTHOPEDIC SURGERY  MAY 2010    FRACTURED RIGHT ELBOW    PRIMARY C SECTION         Social History     Tobacco Use    Smoking status: Never    Smokeless tobacco: Never     Tobacco comments:     Never smoked tobacco   Vaping Use    Vaping Use: Never used   Substance Use Topics    Alcohol use: No    Drug use: No        Family History   Problem Relation Age of Onset    Kidney Disease Father          93    No Known Problems Brother     Breast Cancer Maternal Aunt     Cancer Maternal Aunt         PASSED AWAY FROM BREAST CANCER    Cancer Maternal Aunt         BREAST CANCER - IN REMISSION    Breast Cancer Maternal Aunt     Clotting Disorder Neg Hx     Stroke Neg Hx     Heart Attack Neg Hx     Tubal Cancer Neg Hx     Ovarian Cancer Neg Hx     Peritoneal Cancer Neg Hx     Colorectal Cancer Neg Hx     Diabetes Neg Hx     Heart Disease Neg Hx     Hypertension Neg Hx     Hyperlipidemia Neg Hx        Allergies as of 2023 - Reviewed 2023   Allergen Reaction Noted    Penicillin g Hives 04/10/2013    Tetracycline Vomiting 04/10/2013         Current Outpatient Medications:     GARLIC PO, Take  by mouth., Disp: , Rfl:     NIACIN PO, Take  by mouth., Disp: , Rfl:     Omega-3 Fatty Acids (FISH OIL) 1000 MG Cap capsule, Take 1,000 mg by mouth every day., Disp: , Rfl:     aspirin 81 MG EC tablet, Take 1 Tablet by mouth every day., Disp: 30 Tablet, Rfl:     Multiple Vitamins-Minerals (MULTIVITAMIN ADULT PO), Take  by mouth., Disp: , Rfl:     Review of Systems:  Review of Systems   Constitutional: Negative.    HENT: Negative.     Eyes: Negative.    Respiratory: Negative.     Cardiovascular: Negative.    Gastrointestinal: Negative.    Genitourinary: Negative.    Musculoskeletal: Negative.    Skin: Negative.    Neurological:  Positive for focal weakness and weakness.   Endo/Heme/Allergies: Negative.    Psychiatric/Behavioral: Negative.          Physical Exam:  There were no vitals filed for this visit.    DESC; KARNOFSKY SCALE WITH ECOG EQUIVALENT: 70, Cares for self; unable to carry on normal activity or to do active work (ECOG equivalent 1)    DISTRESS LEVEL: no acute distress    Physical  Exam  Constitutional:       Appearance: Normal appearance.   HENT:      Head: Normocephalic.      Mouth/Throat:      Mouth: Mucous membranes are moist.      Pharynx: Oropharynx is clear.   Eyes:      Extraocular Movements: Extraocular movements intact.      Conjunctiva/sclera: Conjunctivae normal.      Pupils: Pupils are equal, round, and reactive to light.   Cardiovascular:      Rate and Rhythm: Normal rate and regular rhythm.      Pulses: Normal pulses.   Pulmonary:      Effort: Pulmonary effort is normal.      Breath sounds: Normal breath sounds.   Chest:      Comments: Left breast shows a 3 x 3 cm mass at 230 o'clock 7 cm from the nipple which is firm to hard.  No left axillary or supraclavicular adenopathy is noted.  The right breast is normal.  Abdominal:      General: Abdomen is flat. Bowel sounds are normal.      Palpations: Abdomen is soft. There is no mass.   Musculoskeletal:         General: Normal range of motion.   Skin:     General: Skin is warm.   Neurological:      Mental Status: She is alert and oriented to person, place, and time.      Motor: Weakness present.      Deep Tendon Reflexes:      Reflex Scores:       Tricep reflexes are 0 on the right side.       Bicep reflexes are 0 on the right side.       Patellar reflexes are 0 on the right side.       Achilles reflexes are 0 on the right side.     Comments: Severe right-sided weakness is noted secondary to postpolio syndrome   Psychiatric:         Mood and Affect: Mood normal.         Behavior: Behavior normal.        Labs:  Hospital Outpatient Visit on 03/06/2023   Component Date Value Ref Range Status    Pathology Request 03/06/2023 Sent to [facility]   Final       Imaging:   All listed images below have been independently reviewed by me. I agree with the findings as summarized below:      US-BREAST LIMITED-LEFT    Result Date: 3/6/2023  3/6/2023 10:50 AM HISTORY/REASON FOR EXAM: Palpable lump TECHNIQUE/EXAM DESCRIPTION AND NUMBER OF VIEWS: Bilateral  tomosynthesis diagnostic mammography was performed with standard mammographic images generated from the data set. Images were reviewed and interpreted with CAD. Left breast ultrasound was then performed COMPARISON: 6/10/2015 FINDINGS: There are scattered areas of fibroglandular density. There is a new 3 cm spiculated palpable mass with architectural distortion in the upper outer left breast 7 cm from the nipple. No axillary adenopathy is seen. Right breast has no interval change with some punctate and coarse calcifications again noted. Left breast ultrasound at the palpable area in question, 3:00, 6 cm in the nipple confirms a hypoechoic spiculated irregular mass with abundant vascularity. The mass measures 27 x 24 x 20 mm. Review of the axilla shows no enlarged lymph node     Palpable 3:00 2.7 cm left breast mass is highly worrisome and ultrasound-guided biopsy with Fiona reflector placement is recommended No evidence of lymphadenopathy Right breast benign findings These findings were discussed with the patient and all questions were addressed.   She is meeting with our biopsy coordinator at this time. R5- CATEGORY 5:  HIGHLY SUGGESTIVE OF MALIGNANCY - APPROPRIATE ACTION SHOULD BE TAKEN Ten to twenty percent of all cancers can be categorized as hereditary and the clinical and financial value of identifying patients and families at risk is well documented. If you have a personal or family history of breast, ovarian, fallopian tube, peritoneal or other cancer, please consult your physician regarding genetic counseling and testing.     MA-DIAGNOSTIC MAMMO BILAT W/TOMOSYNTHESIS W/CAD    Result Date: 3/6/2023  3/6/2023 10:50 AM HISTORY/REASON FOR EXAM: Palpable lump TECHNIQUE/EXAM DESCRIPTION AND NUMBER OF VIEWS: Bilateral tomosynthesis diagnostic mammography was performed with standard mammographic images generated from the data set. Images were reviewed and interpreted with CAD. Left breast ultrasound was then  performed COMPARISON: 6/10/2015 FINDINGS: There are scattered areas of fibroglandular density. There is a new 3 cm spiculated palpable mass with architectural distortion in the upper outer left breast 7 cm from the nipple. No axillary adenopathy is seen. Right breast has no interval change with some punctate and coarse calcifications again noted. Left breast ultrasound at the palpable area in question, 3:00, 6 cm in the nipple confirms a hypoechoic spiculated irregular mass with abundant vascularity. The mass measures 27 x 24 x 20 mm. Review of the axilla shows no enlarged lymph node     Palpable 3:00 2.7 cm left breast mass is highly worrisome and ultrasound-guided biopsy with Fiona reflector placement is recommended No evidence of lymphadenopathy Right breast benign findings These findings were discussed with the patient and all questions were addressed.   She is meeting with our biopsy coordinator at this time. R5- CATEGORY 5:  HIGHLY SUGGESTIVE OF MALIGNANCY - APPROPRIATE ACTION SHOULD BE TAKEN Ten to twenty percent of all cancers can be categorized as hereditary and the clinical and financial value of identifying patients and families at risk is well documented. If you have a personal or family history of breast, ovarian, fallopian tube, peritoneal or other cancer, please consult your physician regarding genetic counseling and testing.       Pathology:      Assessment & Plan:    1.  Ductal carcinoma of the left breast, grade 3, clinically stage IIa (cT2, cN0) ER +95%, AR negative, HER2 IHC 2+ FISH negative, Ki-67 30%.  She is scheduled for partial mastectomy next week with sentinel lymph node biopsy.  We are going to obtain an Oncotype Dx recurrence score on her primary biopsy so we will have this back when she returns in 3 weeks after her surgery.  2.  Postpolio syndrome with significant motor neuropathy and severe weakness on the right side    Plan: We will obtain the Oncotype and see her back in 3 weeks to  review the pathologic findings from the surgery as well.  Any questions and concerns raised by the patient were answered to the best of my ability. Thank you for allowing me to participate in the care for this patient. Please feel free to contact me for any questions or concerns.     Ludin Zaman M.D.

## 2023-04-03 NOTE — ADDENDUM NOTE
Encounter addended by: Emilia Frausto, Med Ass't on: 4/3/2023 8:47 AM   Actions taken: Charge Capture section accepted

## 2023-04-04 ENCOUNTER — APPOINTMENT (OUTPATIENT)
Dept: RADIOLOGY | Facility: MEDICAL CENTER | Age: 73
End: 2023-04-04
Attending: SURGERY
Payer: MEDICARE

## 2023-04-04 ENCOUNTER — HOSPITAL ENCOUNTER (OUTPATIENT)
Facility: MEDICAL CENTER | Age: 73
End: 2023-04-04
Attending: SURGERY | Admitting: SURGERY
Payer: MEDICARE

## 2023-04-04 ENCOUNTER — ANESTHESIA (OUTPATIENT)
Dept: SURGERY | Facility: MEDICAL CENTER | Age: 73
End: 2023-04-04
Payer: MEDICARE

## 2023-04-04 ENCOUNTER — ANESTHESIA EVENT (OUTPATIENT)
Dept: SURGERY | Facility: MEDICAL CENTER | Age: 73
End: 2023-04-04
Payer: MEDICARE

## 2023-04-04 VITALS
WEIGHT: 227.07 LBS | OXYGEN SATURATION: 93 % | RESPIRATION RATE: 32 BRPM | SYSTOLIC BLOOD PRESSURE: 129 MMHG | DIASTOLIC BLOOD PRESSURE: 69 MMHG | HEART RATE: 69 BPM | BODY MASS INDEX: 38.98 KG/M2 | TEMPERATURE: 97.5 F

## 2023-04-04 DIAGNOSIS — G89.18 POSTOPERATIVE PAIN: ICD-10-CM

## 2023-04-04 DIAGNOSIS — C50.412 MALIGNANT NEOPLASM OF UPPER-OUTER QUADRANT OF LEFT FEMALE BREAST, UNSPECIFIED ESTROGEN RECEPTOR STATUS (HCC): ICD-10-CM

## 2023-04-04 LAB — PATHOLOGY CONSULT NOTE: NORMAL

## 2023-04-04 PROCEDURE — 160029 HCHG SURGERY MINUTES - 1ST 30 MINS LEVEL 4: Performed by: SURGERY

## 2023-04-04 PROCEDURE — 76098 X-RAY EXAM SURGICAL SPECIMEN: CPT | Mod: LT

## 2023-04-04 PROCEDURE — 160025 RECOVERY II MINUTES (STATS): Performed by: SURGERY

## 2023-04-04 PROCEDURE — 88374 M/PHMTRC ALYS ISHQUANT/SEMIQ: CPT

## 2023-04-04 PROCEDURE — 700111 HCHG RX REV CODE 636 W/ 250 OVERRIDE (IP): Performed by: ANESTHESIOLOGY

## 2023-04-04 PROCEDURE — 88361 TUMOR IMMUNOHISTOCHEM/COMPUT: CPT

## 2023-04-04 PROCEDURE — 700102 HCHG RX REV CODE 250 W/ 637 OVERRIDE(OP): Performed by: SURGERY

## 2023-04-04 PROCEDURE — 01610 ANES ALL PX NRV MUSC SHO&AX: CPT | Performed by: ANESTHESIOLOGY

## 2023-04-04 PROCEDURE — 160041 HCHG SURGERY MINUTES - EA ADDL 1 MIN LEVEL 4: Performed by: SURGERY

## 2023-04-04 PROCEDURE — 160046 HCHG PACU - 1ST 60 MINS PHASE II: Performed by: SURGERY

## 2023-04-04 PROCEDURE — 88307 TISSUE EXAM BY PATHOLOGIST: CPT | Mod: 91,XU

## 2023-04-04 PROCEDURE — 700101 HCHG RX REV CODE 250: Performed by: SURGERY

## 2023-04-04 PROCEDURE — 160048 HCHG OR STATISTICAL LEVEL 1-5: Performed by: SURGERY

## 2023-04-04 PROCEDURE — A9270 NON-COVERED ITEM OR SERVICE: HCPCS | Performed by: SURGERY

## 2023-04-04 PROCEDURE — 700105 HCHG RX REV CODE 258: Performed by: SURGERY

## 2023-04-04 PROCEDURE — 160009 HCHG ANES TIME/MIN: Performed by: SURGERY

## 2023-04-04 PROCEDURE — 160035 HCHG PACU - 1ST 60 MINS PHASE I: Performed by: SURGERY

## 2023-04-04 PROCEDURE — 160002 HCHG RECOVERY MINUTES (STAT): Performed by: SURGERY

## 2023-04-04 PROCEDURE — 99100 ANES PT EXTEME AGE<1 YR&>70: CPT | Performed by: ANESTHESIOLOGY

## 2023-04-04 PROCEDURE — 38792 RA TRACER ID OF SENTINL NODE: CPT | Mod: LT

## 2023-04-04 PROCEDURE — 88342 IMHCHEM/IMCYTCHM 1ST ANTB: CPT | Mod: 91

## 2023-04-04 PROCEDURE — 700101 HCHG RX REV CODE 250: Performed by: ANESTHESIOLOGY

## 2023-04-04 RX ORDER — ONDANSETRON 2 MG/ML
INJECTION INTRAMUSCULAR; INTRAVENOUS PRN
Status: DISCONTINUED | OUTPATIENT
Start: 2023-04-04 | End: 2023-04-04 | Stop reason: SURG

## 2023-04-04 RX ORDER — ONDANSETRON 4 MG/1
4 TABLET, FILM COATED ORAL EVERY 8 HOURS PRN
Qty: 9 TABLET | Refills: 2 | Status: SHIPPED | OUTPATIENT
Start: 2023-04-04 | End: 2023-04-07

## 2023-04-04 RX ORDER — ISOSULFAN BLUE 50 MG/5ML
INJECTION, SOLUTION SUBCUTANEOUS
Status: DISCONTINUED
Start: 2023-04-04 | End: 2023-04-04 | Stop reason: HOSPADM

## 2023-04-04 RX ORDER — LIDOCAINE AND PRILOCAINE 25; 25 MG/G; MG/G
CREAM TOPICAL ONCE
Status: COMPLETED | OUTPATIENT
Start: 2023-04-04 | End: 2023-04-04

## 2023-04-04 RX ORDER — PHENYLEPHRINE HYDROCHLORIDE 10 MG/ML
INJECTION, SOLUTION INTRAMUSCULAR; INTRAVENOUS; SUBCUTANEOUS PRN
Status: DISCONTINUED | OUTPATIENT
Start: 2023-04-04 | End: 2023-04-04 | Stop reason: SURG

## 2023-04-04 RX ORDER — LIDOCAINE HYDROCHLORIDE 20 MG/ML
INJECTION, SOLUTION EPIDURAL; INFILTRATION; INTRACAUDAL; PERINEURAL PRN
Status: DISCONTINUED | OUTPATIENT
Start: 2023-04-04 | End: 2023-04-04 | Stop reason: SURG

## 2023-04-04 RX ORDER — ONDANSETRON 2 MG/ML
4 INJECTION INTRAMUSCULAR; INTRAVENOUS
Status: DISCONTINUED | OUTPATIENT
Start: 2023-04-04 | End: 2023-04-04 | Stop reason: HOSPADM

## 2023-04-04 RX ORDER — DIPHENHYDRAMINE HYDROCHLORIDE 50 MG/ML
12.5 INJECTION INTRAMUSCULAR; INTRAVENOUS
Status: DISCONTINUED | OUTPATIENT
Start: 2023-04-04 | End: 2023-04-04 | Stop reason: HOSPADM

## 2023-04-04 RX ORDER — HALOPERIDOL 5 MG/ML
1 INJECTION INTRAMUSCULAR
Status: DISCONTINUED | OUTPATIENT
Start: 2023-04-04 | End: 2023-04-04 | Stop reason: HOSPADM

## 2023-04-04 RX ORDER — KETOROLAC TROMETHAMINE 30 MG/ML
INJECTION, SOLUTION INTRAMUSCULAR; INTRAVENOUS PRN
Status: DISCONTINUED | OUTPATIENT
Start: 2023-04-04 | End: 2023-04-04 | Stop reason: SURG

## 2023-04-04 RX ORDER — BUPIVACAINE HYDROCHLORIDE AND EPINEPHRINE 5; 5 MG/ML; UG/ML
INJECTION, SOLUTION EPIDURAL; INTRACAUDAL; PERINEURAL
Status: DISCONTINUED
Start: 2023-04-04 | End: 2023-04-04 | Stop reason: HOSPADM

## 2023-04-04 RX ORDER — BUPIVACAINE HYDROCHLORIDE AND EPINEPHRINE 5; 5 MG/ML; UG/ML
INJECTION, SOLUTION EPIDURAL; INTRACAUDAL; PERINEURAL
Status: DISCONTINUED | OUTPATIENT
Start: 2023-04-04 | End: 2023-04-04 | Stop reason: HOSPADM

## 2023-04-04 RX ORDER — METOCLOPRAMIDE HYDROCHLORIDE 5 MG/ML
INJECTION INTRAMUSCULAR; INTRAVENOUS PRN
Status: DISCONTINUED | OUTPATIENT
Start: 2023-04-04 | End: 2023-04-04 | Stop reason: SURG

## 2023-04-04 RX ORDER — CEFAZOLIN SODIUM 1 G/3ML
INJECTION, POWDER, FOR SOLUTION INTRAMUSCULAR; INTRAVENOUS PRN
Status: DISCONTINUED | OUTPATIENT
Start: 2023-04-04 | End: 2023-04-04 | Stop reason: SURG

## 2023-04-04 RX ORDER — SODIUM CHLORIDE, SODIUM LACTATE, POTASSIUM CHLORIDE, CALCIUM CHLORIDE 600; 310; 30; 20 MG/100ML; MG/100ML; MG/100ML; MG/100ML
INJECTION, SOLUTION INTRAVENOUS CONTINUOUS
Status: DISCONTINUED | OUTPATIENT
Start: 2023-04-04 | End: 2023-04-04 | Stop reason: HOSPADM

## 2023-04-04 RX ORDER — HYDROCODONE BITARTRATE AND ACETAMINOPHEN 5; 325 MG/1; MG/1
1 TABLET ORAL EVERY 4 HOURS PRN
Qty: 18 TABLET | Refills: 0 | Status: SHIPPED | OUTPATIENT
Start: 2023-04-04 | End: 2023-04-07

## 2023-04-04 RX ORDER — ALPRAZOLAM 0.25 MG/1
.25-.5 TABLET ORAL
Status: COMPLETED | OUTPATIENT
Start: 2023-04-04 | End: 2023-04-04

## 2023-04-04 RX ADMIN — PHENYLEPHRINE HYDROCHLORIDE 100 MCG: 10 INJECTION INTRAVENOUS at 13:54

## 2023-04-04 RX ADMIN — LIDOCAINE AND PRILOCAINE 1 APPLICATION: 25; 25 CREAM TOPICAL at 10:12

## 2023-04-04 RX ADMIN — SODIUM CHLORIDE, POTASSIUM CHLORIDE, SODIUM LACTATE AND CALCIUM CHLORIDE: 600; 310; 30; 20 INJECTION, SOLUTION INTRAVENOUS at 12:16

## 2023-04-04 RX ADMIN — PROPOFOL 200 MG: 10 INJECTION, EMULSION INTRAVENOUS at 12:24

## 2023-04-04 RX ADMIN — SODIUM CHLORIDE, POTASSIUM CHLORIDE, SODIUM LACTATE AND CALCIUM CHLORIDE: 600; 310; 30; 20 INJECTION, SOLUTION INTRAVENOUS at 10:13

## 2023-04-04 RX ADMIN — ALPRAZOLAM 0.5 MG: 0.25 TABLET ORAL at 10:11

## 2023-04-04 RX ADMIN — KETOROLAC TROMETHAMINE 30 MG: 30 INJECTION, SOLUTION INTRAMUSCULAR at 14:05

## 2023-04-04 RX ADMIN — LIDOCAINE HYDROCHLORIDE 100 MG: 20 INJECTION, SOLUTION EPIDURAL; INFILTRATION; INTRACAUDAL at 12:24

## 2023-04-04 RX ADMIN — PHENYLEPHRINE HYDROCHLORIDE 100 MCG: 10 INJECTION INTRAVENOUS at 13:56

## 2023-04-04 RX ADMIN — CEFAZOLIN 2 G: 330 INJECTION, POWDER, FOR SOLUTION INTRAMUSCULAR; INTRAVENOUS at 12:24

## 2023-04-04 RX ADMIN — METOCLOPRAMIDE 10 MG: 5 INJECTION, SOLUTION INTRAMUSCULAR; INTRAVENOUS at 14:05

## 2023-04-04 RX ADMIN — PHENYLEPHRINE HYDROCHLORIDE 100 MCG: 10 INJECTION INTRAVENOUS at 13:32

## 2023-04-04 RX ADMIN — ONDANSETRON 4 MG: 2 INJECTION INTRAMUSCULAR; INTRAVENOUS at 14:05

## 2023-04-04 ASSESSMENT — PAIN DESCRIPTION - PAIN TYPE: TYPE: SURGICAL PAIN

## 2023-04-04 ASSESSMENT — FIBROSIS 4 INDEX
FIB4 SCORE: 1.190103216423260314
FIB4 SCORE: 1.190103216423260314

## 2023-04-04 NOTE — DISCHARGE INSTRUCTIONS
HOME CARE INSTRUCTIONS    ACTIVITY: Rest and take it easy for the first 24 hours.  A responsible adult is recommended to remain with you during that time.  It is normal to feel sleepy.  We encourage you to not do anything that requires balance, judgment or coordination.    FOR 24 HOURS DO NOT:  Drive, operate machinery or run household appliances.  Drink beer or alcoholic beverages.  Make important decisions or sign legal documents.    SPECIAL INSTRUCTIONS: Walk frequently.  Follow instructions given to you in a folder by Dr. Donaldson at your pre-op appointment.     DIET: To avoid nausea, slowly advance diet as tolerated, avoiding spicy or greasy foods for the first day.  Add more substantial food to your diet according to your physician's instructions.  Babies can be fed formula or breast milk as soon as they are hungry.  INCREASE FLUIDS AND FIBER TO AVOID CONSTIPATION.    SURGICAL DRESSING/BATHING: OK to shower tomorrow, but no baths or submerging in water for 4 weeks. Wear binder over sports bra for compression 24/7 except when showering.      MEDICATIONS: Resume taking daily medication.  Take prescribed pain medication with food.  If no medication is prescribed, you may take non-aspirin pain medication if needed.  PAIN MEDICATION CAN BE VERY CONSTIPATING.  Take a stool softener or laxative such as senokot, pericolace, or milk of magnesia if needed.    Prescription given for Norco (do not take additional plain Tylenol while using this med) and Zofran.  Last pain medication given at _______.    A follow-up appointment should be arranged with your doctor; call to schedule.    You should CALL YOUR PHYSICIAN if you develop:  Fever greater than 101 degrees F.  Pain not relieved by medication, or persistent nausea or vomiting.  Excessive bleeding (blood soaking through dressing) or unexpected drainage from the wound.  Extreme redness or swelling around the incision site, drainage of pus or foul smelling drainage.  Inability  to urinate or empty your bladder within 8 hours.  Problems with breathing or chest pain.    You should call 911 if you develop problems with breathing or chest pain.  If you are unable to contact your doctor or surgical center, you should go to the nearest emergency room or urgent care center.  Physician's telephone #: Dr. Donaldson [phone]    MILD FLU-LIKE SYMPTOMS ARE NORMAL.  YOU MAY EXPERIENCE GENERALIZED MUSCLE ACHES, THROAT IRRITATION, HEADACHE AND/OR SOME NAUSEA.    If any questions arise, call your doctor.  If your doctor is not available, please feel free to call the Surgical Center at [phone].  The Center is open Monday through Friday from 7AM to 7PM.      A registered nurse may call you a few days after your surgery to see how you are doing after your procedure.    You may also receive a survey in the mail within the next two weeks and we ask that you take a few moments to complete the survey and return it to us.  Our goal is to provide you with very good care and we value your comments.     Depression / Suicide Risk    As you are discharged from this [facility] facility, it is important to learn how to keep safe from harming yourself.    Recognize the warning signs:  Abrupt changes in personality, positive or negative- including increase in energy   Giving away possessions  Change in eating patterns- significant weight changes-  positive or negative  Change in sleeping patterns- unable to sleep or sleeping all the time   Unwillingness or inability to communicate  Depression  Unusual sadness, discouragement and loneliness  Talk of wanting to die  Neglect of personal appearance   Rebelliousness- reckless behavior  Withdrawal from people/activities they love  Confusion- inability to concentrate     If you or a loved one observes any of these behaviors or has concerns about self-harm, here's what you can do:  Talk about it- your feelings and reasons for harming yourself  Remove any means that you  might use to hurt yourself (examples: pills, rope, extension cords, firearm)  Get professional help from the community (Mental Health, Substance Abuse, psychological counseling)  Do not be alone:Call your Safe Contact- someone whom you trust who will be there for you.  Call your local CRISIS HOTLINE 302-0023 or 931-318-3160  Call your local Children's Mobile Crisis Response Team [place] (147) 057-5866 or www.[facility]  Call the toll free National Suicide Prevention Hotlines   National Suicide Prevention Lifeline 594-561-BEJJ (7438)  [facility] 800-SUICIDE (441-8987)    I acknowledge receipt and understanding of these Home Care instructions.

## 2023-04-04 NOTE — ANESTHESIA POSTPROCEDURE EVALUATION
Patient: Donna Palmer    Procedure Summary     Date: 04/04/23 Room / Location: [facility] ROOM 28 / SURGERY SAME DAY [facility]    Anesthesia Start: 1216 Anesthesia Stop: 1415    Procedures:       LEFT PARTIAL MASTECTOMY, LEFT SENTINEL LYMPH NODE INJECTION WITH EXCISION OF AXILLARY NODES (Left: Breast)      BIOPSY, LYMPH NODE, SENTINEL (Left: Axilla) Diagnosis: (MALIGNANT NEOPLASM OF UPPER OUTER QUADRANT OF LEFT FEMALE BREAST)    Surgeons: Lubna Donaldson M.D. Responsible Provider: Chris Madrid M.D.    Anesthesia Type: general ASA Status: 2          Final Anesthesia Type: general  Last vitals  BP   Blood Pressure : 129/69    Temp   36.4 °C (97.5 °F)    Pulse   69   Resp   (!) 32    SpO2   93 %      Anesthesia Post Evaluation    Patient location during evaluation: PACU  Patient participation: complete - patient participated  Level of consciousness: awake and alert    Airway patency: patent  Anesthetic complications: no  Cardiovascular status: hemodynamically stable  Respiratory status: acceptable  Hydration status: euvolemic    PONV: none          There were no known notable events for this encounter.     Nurse Pain Score: 0 (NPRS)         Received request via: Pharmacy    Was the patient seen in the last year in this department? Yes    Does the patient have an active prescription (recently filled or refills available) for medication(s) requested? No

## 2023-04-04 NOTE — ANESTHESIA TIME REPORT
Anesthesia Start and Stop Event Times     Date Time Event    4/4/2023 1208 Ready for Procedure     1216 Anesthesia Start     1415 Anesthesia Stop        Responsible Staff  04/04/23    Name Role Begin End    Chris Madrid M.D. Anesth 1216 1415        Overtime Reason:  no overtime (within assigned shift)    Comments:

## 2023-04-04 NOTE — OR NURSING
1412-  Pt arrived to PACU, report received.  Pt on L mask. 2 L breast incisions, both with tegaderm and gauze, CDI.  Covered with breast binder.     1509- phase 2 met.     1515- Pts  brought back to bedside.  Discharge instructions discussed.  All questions answered at this time.     1530- Pt getting dressed.      1547- IV dc with tip intact.  Pt discharged home with all belongings.

## 2023-04-04 NOTE — ANESTHESIA PREPROCEDURE EVALUATION
Case: [id number] Date/Time: 04/04/23 1215    Procedures:       LEFT PARTIAL MASTECTOMY, LEFT SENTINEL LYMPH NODE INJECTION WITH EXCISION OF AXILLARY NODES (Breast)      BIOPSY, LYMPH NODE, SENTINEL (Breast)    Anesthesia type: General    Pre-op diagnosis: MALIGNANT NEOPLASM OF UPPER OUTER QUADRANT OF LEFT FEMALE BREAST    Location: [facility] ROOM 28 / SURGERY SAME DAY [facility]    Surgeons: Lubna Donaldson M.D.        Post polio syndrome  Relevant Problems   CARDIAC   (positive) Arrhythmia   (positive) Chronic venous insufficiency   (positive) Primary hypertension       Physical Exam    Airway   Mallampati: II  TM distance: >3 FB  Neck ROM: full       Cardiovascular - normal exam  Rhythm: regular  Rate: normal  (-) murmur     Dental - normal exam           Pulmonary - normal exam  Breath sounds clear to auscultation     Abdominal    Neurological - normal exam                 Anesthesia Plan    ASA 2       Plan - general       Airway plan will be LMA          Induction: intravenous      Pertinent diagnostic labs and testing reviewed    Informed Consent:    Anesthetic plan and risks discussed with patient.

## 2023-04-04 NOTE — ANESTHESIA PROCEDURE NOTES
Airway    Date/Time: 4/4/2023 12:24 PM  Performed by: Chris Madrid M.D.  Authorized by: Chris Madrid M.D.     Location:  OR  Urgency:  Elective  Indications for Airway Management:  Anesthesia      Spontaneous Ventilation: absent    Sedation Level:  Deep  Preoxygenated: Yes    Final Airway Type:  Supraglottic airway  Final Supraglottic Airway:  Standard LMA    SGA Size:  3  Number of Attempts at Approach:  1

## 2023-04-04 NOTE — OP REPORT
Operative Report    Date: 4/4/2023      Pre-operative Diagnosis: Malignant neoplasm left upper outer breast    Post-operative Diagnosis: Same    Procedure:   Left partial mastectomy with lattice  Left sentinel lymph node mapping with excision of deep axillary nodes      Surgeon: Lubna Donaldson M.D.    Assistant: None    Anesthesiologist: Chris Madrid MD      Anesthesia:  General    Pre-Op Meds:  Ancef    ASA class:  2    Indications:   This is a 72 year old female with left breast cancer, cT2 cN0 M0.  After discussing risks and benefits of her options at length, she is ready to proceed with surgery.    Findings:   Two sentinel nodes to 1900  Mass and DARLENE confirmed with specimen mammo.  Main mass well clear of margins, but fibrous bands approach within 2mm of the medial aspect of posterior margin.  Additional margin excised, suture marks new margin.     Summary:   The patient underwent radiotracer injection by Radiology, then was taken to the operating room.  She was anesthetized, prepped, and draped in sterile fashion.  Time out was confirmed.  Local anesthetic was injected prior to all incisions.    I made an axillary incision following her skin lines, then dissected through subcutaneous tissue and then her clavipectoral fascia which was fairly deep.  The gamma probe was used to locate the deep axillary sentinel nodes.  These were excised using the harmonic scalpel.  There were no suspicious palpable nodes and background counts were less than 10%.  I irrigated, ensured hemostasis, then closed the fascia and deep dermal layer.  4-0 monocryl was used to close the skin.      After reviewing the loc images, an ellipse was excised over the dimpled, palpable mass.  I then dissected widely around the large mass which was hypervascular. Skin is anterior, crescent shaped, and usual orienting sutures were placed and specimen mammogram was performed.  Pathology evaluated the margins and I elected to excised additional  posterior margin (medial aspect).  I irrigated and ensured hemostasis.      Small clips were placed at the parenchymal borders.  Skin is anterior, but there is breast tissue in all other margins.  2-0 PDS was used to create a two layer lattice.  I closed the deep dermal layer and 4-0 monocryl was used for skin.  Dressing was placed and I was informed all counts were correct.        [place] Node Biopsy for Breast Cancer  Operation performed with curative intent Yes   Tracer(s) used to identify sentinel nodes in the upfront surgery (non-neoadjuvant) setting Radioactive tracer   Tracer(s) used to identify sentinel nodes in the neoadjuvant setting N/A   All nodes (colored or non-colored) present at the end of a dye-filled lymphatic channel were removed N/A   All significantly radioactive nodes were removed Yes   All palpably suspicious nodes were removed N/A   Biopsy-proven positive nodes marked with clips prior to chemotherapy were identified and removed N/A

## 2023-04-20 ENCOUNTER — HOSPITAL ENCOUNTER (OUTPATIENT)
Dept: HEMATOLOGY ONCOLOGY | Facility: MEDICAL CENTER | Age: 73
End: 2023-04-20
Attending: INTERNAL MEDICINE
Payer: MEDICARE

## 2023-04-20 DIAGNOSIS — C50.212 MALIGNANT NEOPLASM OF UPPER-INNER QUADRANT OF LEFT BREAST IN FEMALE, ESTROGEN RECEPTOR POSITIVE (HCC): ICD-10-CM

## 2023-04-20 DIAGNOSIS — Z17.0 MALIGNANT NEOPLASM OF UPPER-INNER QUADRANT OF LEFT BREAST IN FEMALE, ESTROGEN RECEPTOR POSITIVE (HCC): ICD-10-CM

## 2023-04-20 PROCEDURE — 99212 OFFICE O/P EST SF 10 MIN: CPT | Performed by: INTERNAL MEDICINE

## 2023-04-20 PROCEDURE — 99214 OFFICE O/P EST MOD 30 MIN: CPT | Performed by: INTERNAL MEDICINE

## 2023-04-20 RX ORDER — SODIUM CHLORIDE 9 MG/ML
INJECTION, SOLUTION INTRAVENOUS CONTINUOUS
Status: CANCELLED | OUTPATIENT
Start: 2023-05-03

## 2023-04-20 RX ORDER — 0.9 % SODIUM CHLORIDE 0.9 %
10 VIAL (ML) INJECTION PRN
Status: CANCELLED | OUTPATIENT
Start: 2023-05-03

## 2023-04-20 RX ORDER — 0.9 % SODIUM CHLORIDE 0.9 %
10 VIAL (ML) INJECTION PRN
Status: CANCELLED | OUTPATIENT
Start: 2023-05-02

## 2023-04-20 RX ORDER — 0.9 % SODIUM CHLORIDE 0.9 %
3 VIAL (ML) INJECTION PRN
Status: CANCELLED | OUTPATIENT
Start: 2023-05-03

## 2023-04-20 RX ORDER — ONDANSETRON 2 MG/ML
4 INJECTION INTRAMUSCULAR; INTRAVENOUS PRN
Status: CANCELLED | OUTPATIENT
Start: 2023-05-03

## 2023-04-20 RX ORDER — PROCHLORPERAZINE MALEATE 10 MG
10 TABLET ORAL EVERY 6 HOURS PRN
Status: CANCELLED | OUTPATIENT
Start: 2023-05-03

## 2023-04-20 RX ORDER — 0.9 % SODIUM CHLORIDE 0.9 %
VIAL (ML) INJECTION PRN
Status: CANCELLED | OUTPATIENT
Start: 2023-05-02

## 2023-04-20 RX ORDER — 0.9 % SODIUM CHLORIDE 0.9 %
3 VIAL (ML) INJECTION PRN
Status: CANCELLED | OUTPATIENT
Start: 2023-05-02

## 2023-04-20 RX ORDER — 0.9 % SODIUM CHLORIDE 0.9 %
VIAL (ML) INJECTION PRN
Status: CANCELLED | OUTPATIENT
Start: 2023-05-03

## 2023-04-20 RX ORDER — ONDANSETRON 8 MG/1
8 TABLET, ORALLY DISINTEGRATING ORAL PRN
Status: CANCELLED | OUTPATIENT
Start: 2023-05-03

## 2023-04-20 ASSESSMENT — ENCOUNTER SYMPTOMS
FOCAL WEAKNESS: 1
GASTROINTESTINAL NEGATIVE: 1
CARDIOVASCULAR NEGATIVE: 1
WEAKNESS: 1
MUSCULOSKELETAL NEGATIVE: 1
PSYCHIATRIC NEGATIVE: 1
CONSTITUTIONAL NEGATIVE: 1
RESPIRATORY NEGATIVE: 1
EYES NEGATIVE: 1

## 2023-04-20 ASSESSMENT — PAIN SCALES - GENERAL: PAINLEVEL: NO PAIN

## 2023-04-20 ASSESSMENT — FIBROSIS 4 INDEX: FIB4 SCORE: 1.190103216423260314

## 2023-04-20 NOTE — LETTER
[facility]   [address] [place] [address],   [address]  [place] [address]  Phone: [phone]  Fax: [phone]              Donna Palmer  1950    Encounter Date: 4/20/2023    Ludin Zaman M.D.          PROGRESS NOTE:  Consult:  Hematology/Oncology      Referring Physician: Lubna Donaldson MD  Primary Care:  JASSON GallegoPPayalRMARI    Diagnosis: Newly diagnosed left breast cancer    Chief Complaint: Newly diagnosed left breast cancer    History of Presenting Illness:  Donna Palmer is a 72 y.o. white female who self detected a mass in her left breast in February 2023.  Has not had a mammogram for several years.  Diagnostic mammogram and ultrasound on 3/6/2023 showed a 2.7 x 2.4 x 2.0 cm spiculated irregular mass at 3 o'clock position of the left breast with no abnormal lymph nodes being detected.  She underwent an immediate ultrasound-guided biopsy demonstrated invasive ductal carcinoma, grade 3, at least 1.7 cm in the core, ER +95%, NM negative, Ki-67 30%, HER2 2+ IHC FISH negative.  She saw Dr. Donaldson in consultation and is planning to undergo partial mastectomy and sentinel lymph node biopsy next week.  Her history is notable for post radial syndrome with motor nerve impairment which is gotten gradually worse over the last couple of years.  She now has to use a scooter almost all the time and has started having impairment in activities of daily living.  Otherwise she has no chronic medical problems.  She has a maternal aunt who had breast cancer.  No other breast or ovarian cancer is noted.    Interval history 4/20/2023: She underwent left partial mastectomy and sentinel lymph node biopsy on 4/4/2023.  Pathology demonstrated invasive ductal carcinoma 3.5 cm in greatest dimension with focal high-grade DCIS.  Margins were clear and there was no lymph vascular invasion.  1 sentinel lymph node was positive for micrometastases and 1 sentinel lymph node was negative.  Postoperative course  was unremarkable but she does feel a firm mass at the incision site which is consistent with a seroma in the breast.  We sent an Oncotype DX which came back with recurrence score of 39.  sHe has no history of heart problems but does have history of hypertension.      Past Medical History:   Diagnosis Date   • Allergy    • Anesthesia MAY 2010    NAUSEA AND VOMITTING AFTER COLONOSCOPY   • Cancer (HCC)  SURGERY TO REMOVE LUMP IN LEFT BREAST   • Chronic venous insufficiency    • Polio osteopathy of lower leg (HCC)    • PONV (postoperative nausea and vomiting) May 2010    NAUSEA AND VOMITTING AFTER COLONOSCOPY       Past Surgical History:   Procedure Laterality Date   • PB MASTECTOMY, PARTIAL Left 2023    Procedure: LEFT PARTIAL MASTECTOMY, LEFT SENTINEL LYMPH NODE INJECTION WITH EXCISION OF AXILLARY NODES;  Surgeon: Lubna Donaldson M.D.;  Location: SURGERY SAME DAY [facility];  Service: General   • NODE BIOPSY SENTINEL Left 2023    Procedure: BIOPSY, LYMPH NODE, SENTINEL;  Surgeon: Lubna Donaldson M.D.;  Location: SURGERY SAME DAY [facility];  Service: General   • ORIF, ELBOW Right 2008   • OTHER ORTHOPEDIC SURGERY  MAY 2010    FRACTURED RIGHT ELBOW   • PRIMARY C SECTION         Social History     Tobacco Use   • Smoking status: Never   • Smokeless tobacco: Never   • Tobacco comments:     Never smoked tobacco   Vaping Use   • Vaping Use: Never used   Substance Use Topics   • Alcohol use: No   • Drug use: No        Family History   Problem Relation Age of Onset   • Kidney Disease Father          93   • No Known Problems Brother    • Breast Cancer Maternal Aunt    • Cancer Maternal Aunt         PASSED AWAY FROM BREAST CANCER   • Cancer Maternal Aunt         BREAST CANCER - IN REMISSION   • Breast Cancer Maternal Aunt    • Clotting Disorder Neg Hx    • Stroke Neg Hx    • Heart Attack Neg Hx    • Tubal Cancer Neg Hx    • Ovarian Cancer Neg Hx    • Peritoneal Cancer Neg Hx    •  "Colorectal Cancer Neg Hx    • Diabetes Neg Hx    • Heart Disease Neg Hx    • Hypertension Neg Hx    • Hyperlipidemia Neg Hx        Allergies as of 04/20/2023 - Reviewed 04/20/2023   Allergen Reaction Noted   • Penicillin g Hives 04/10/2013   • Tetracycline Vomiting 04/10/2013         Current Outpatient Medications:   •  Multiple Vitamins-Minerals (MULTIVITAMIN ADULT PO), Take  by mouth., Disp: , Rfl:     Review of Systems:  Review of Systems   Constitutional: Negative.    HENT: Negative.     Eyes: Negative.    Respiratory: Negative.     Cardiovascular: Negative.    Gastrointestinal: Negative.    Genitourinary: Negative.    Musculoskeletal: Negative.    Skin: Negative.    Neurological:  Positive for focal weakness and weakness.   Endo/Heme/Allergies: Negative.    Psychiatric/Behavioral: Negative.          Physical Exam:  Vitals:    04/20/23 1410   BP: (!) 163/94   Pulse: 81   Resp: 18   Temp: 37.6 °C (99.6 °F)   TempSrc: Temporal   SpO2: 95%   Weight: (!) 227 kg (500 lb 7.1 oz)   Height: 1.626 m (5' 4\")       DESC; KARNOFSKY SCALE WITH ECOG EQUIVALENT: 70, Cares for self; unable to carry on normal activity or to do active work (ECOG equivalent 1)    DISTRESS LEVEL: no acute distress    Physical Exam  Constitutional:       Appearance: Normal appearance.   HENT:      Head: Normocephalic.      Mouth/Throat:      Mouth: Mucous membranes are moist.      Pharynx: Oropharynx is clear.   Eyes:      Extraocular Movements: Extraocular movements intact.      Conjunctiva/sclera: Conjunctivae normal.      Pupils: Pupils are equal, round, and reactive to light.   Cardiovascular:      Rate and Rhythm: Normal rate and regular rhythm.      Pulses: Normal pulses.   Pulmonary:      Effort: Pulmonary effort is normal.      Breath sounds: Normal breath sounds.   Chest:      Comments: Left breast shows a 3 x 3 cm mass at 230 o'clock 7 cm from the nipple which is firm to hard.  No left axillary or supraclavicular adenopathy is noted.  The " right breast is normal.  Abdominal:      General: Abdomen is flat. Bowel sounds are normal.      Palpations: Abdomen is soft. There is no mass.   Musculoskeletal:         General: Normal range of motion.   Skin:     General: Skin is warm.   Neurological:      Mental Status: She is alert and oriented to person, place, and time.      Motor: Weakness present.      Deep Tendon Reflexes:      Reflex Scores:       Tricep reflexes are 0 on the right side.       Bicep reflexes are 0 on the right side.       Patellar reflexes are 0 on the right side.       Achilles reflexes are 0 on the right side.     Comments: Severe right-sided weakness is noted secondary to postpolio syndrome   Psychiatric:         Mood and Affect: Mood normal.         Behavior: Behavior normal.        Labs:  Hospital Outpatient Visit on 03/06/2023   Component Date Value Ref Range Status   • Pathology Request 03/06/2023 Sent to Histo   Final       Imaging:   All listed images below have been independently reviewed by me. I agree with the findings as summarized below:      US-BREAST LIMITED-LEFT    Result Date: 3/6/2023  3/6/2023 10:50 AM HISTORY/REASON FOR EXAM: Palpable lump TECHNIQUE/EXAM DESCRIPTION AND NUMBER OF VIEWS: Bilateral tomosynthesis diagnostic mammography was performed with standard mammographic images generated from the data set. Images were reviewed and interpreted with CAD. Left breast ultrasound was then performed COMPARISON: 6/10/2015 FINDINGS: There are scattered areas of fibroglandular density. There is a new 3 cm spiculated palpable mass with architectural distortion in the upper outer left breast 7 cm from the nipple. No axillary adenopathy is seen. Right breast has no interval change with some punctate and coarse calcifications again noted. Left breast ultrasound at the palpable area in question, 3:00, 6 cm in the nipple confirms a hypoechoic spiculated irregular mass with abundant vascularity. The mass measures 27 x 24 x 20 mm.  Review of the axilla shows no enlarged lymph node     Palpable 3:00 2.7 cm left breast mass is highly worrisome and ultrasound-guided biopsy with Fiona reflector placement is recommended No evidence of lymphadenopathy Right breast benign findings These findings were discussed with the patient and all questions were addressed.   She is meeting with our biopsy coordinator at this time. R5- CATEGORY 5:  HIGHLY SUGGESTIVE OF MALIGNANCY - APPROPRIATE ACTION SHOULD BE TAKEN Ten to twenty percent of all cancers can be categorized as hereditary and the clinical and financial value of identifying patients and families at risk is well documented. If you have a personal or family history of breast, ovarian, fallopian tube, peritoneal or other cancer, please consult your physician regarding genetic counseling and testing.     MA-DIAGNOSTIC MAMMO BILAT W/TOMOSYNTHESIS W/CAD    Result Date: 3/6/2023  3/6/2023 10:50 AM HISTORY/REASON FOR EXAM: Palpable lump TECHNIQUE/EXAM DESCRIPTION AND NUMBER OF VIEWS: Bilateral tomosynthesis diagnostic mammography was performed with standard mammographic images generated from the data set. Images were reviewed and interpreted with CAD. Left breast ultrasound was then performed COMPARISON: 6/10/2015 FINDINGS: There are scattered areas of fibroglandular density. There is a new 3 cm spiculated palpable mass with architectural distortion in the upper outer left breast 7 cm from the nipple. No axillary adenopathy is seen. Right breast has no interval change with some punctate and coarse calcifications again noted. Left breast ultrasound at the palpable area in question, 3:00, 6 cm in the nipple confirms a hypoechoic spiculated irregular mass with abundant vascularity. The mass measures 27 x 24 x 20 mm. Review of the axilla shows no enlarged lymph node     Palpable 3:00 2.7 cm left breast mass is highly worrisome and ultrasound-guided biopsy with Fiona reflector placement is recommended No evidence of  lymphadenopathy Right breast benign findings These findings were discussed with the patient and all questions were addressed.   She is meeting with our biopsy coordinator at this time. R5- CATEGORY 5:  HIGHLY SUGGESTIVE OF MALIGNANCY - APPROPRIATE ACTION SHOULD BE TAKEN Ten to twenty percent of all cancers can be categorized as hereditary and the clinical and financial value of identifying patients and families at risk is well documented. If you have a personal or family history of breast, ovarian, fallopian tube, peritoneal or other cancer, please consult your physician regarding genetic counseling and testing.       Pathology:      Assessment & Plan:    1.  Invasive ductal carcinoma of the left breast, grade 3, clinically stage IIa (cT2, cN0) ER +95%, KS negative, HER2 IHC 2+ FISH negative, Ki-67 30%.  Status post left partial mastectomy and sentinel lymph node biopsy for invasive ductal carcinoma, grade 3, stage IIb (pT2, PN 1 LEXI).  Oncotype DX recurrence score 39.  She is a good candidate for adjuvant chemotherapy.  Due to her postpolio syndrome would avoid taxanes and I am planning AC x4 if her echocardiogram is normal.  2.  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  We will asked Dr. Donaldson to place a Port-A-Cath.  She will return in 1 week for chemo education and plan to initiate chemotherapy the week after.  Any questions and concerns raised by the patient were answered to the best of my ability. Thank you for allowing me to participate in the care for this patient. Please feel free to contact me for any questions or concerns.     Ludin Zaman M.D.          Lubna Donaldson M.D.  [facility] [address] [place] [phone]  Via Fax: [phone]

## 2023-04-20 NOTE — ADDENDUM NOTE
Encounter addended by: Ashley Dover, Med Ass't on: 4/20/2023 3:22 PM   Actions taken: Charge Capture section accepted

## 2023-04-20 NOTE — PROGRESS NOTES
Consult:  Hematology/Oncology      Referring Physician: Lubna Donaldson MD  Primary Care:  AUGUSTA Gallego    Diagnosis: Newly diagnosed left breast cancer    Chief Complaint: Newly diagnosed left breast cancer    History of Presenting Illness:  Donna Palmer is a 72 y.o. white female who self detected a mass in her left breast in February 2023.  Has not had a mammogram for several years.  Diagnostic mammogram and ultrasound on 3/6/2023 showed a 2.7 x 2.4 x 2.0 cm spiculated irregular mass at 3 o'clock position of the left breast with no abnormal lymph nodes being detected.  She underwent an immediate ultrasound-guided biopsy demonstrated invasive ductal carcinoma, grade 3, at least 1.7 cm in the core, ER +95%, NE negative, Ki-67 30%, HER2 2+ IHC FISH negative.  She saw Dr. Donaldson in consultation and is planning to undergo partial mastectomy and sentinel lymph node biopsy next week.  Her history is notable for post radial syndrome with motor nerve impairment which is gotten gradually worse over the last couple of years.  She now has to use a scooter almost all the time and has started having impairment in activities of daily living.  Otherwise she has no chronic medical problems.  She has a maternal aunt who had breast cancer.  No other breast or ovarian cancer is noted.    Interval history 4/20/2023: She underwent left partial mastectomy and sentinel lymph node biopsy on 4/4/2023.  Pathology demonstrated invasive ductal carcinoma 3.5 cm in greatest dimension with focal high-grade DCIS.  Margins were clear and there was no lymph vascular invasion.  1 sentinel lymph node was positive for micrometastases and 1 sentinel lymph node was negative.  Postoperative course was unremarkable but she does feel a firm mass at the incision site which is consistent with a seroma in the breast.  We sent an Oncotype DX which came back with recurrence score of 39.  sHe has no history of heart problems but does have  history of hypertension.      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    PONV (postoperative nausea and vomiting) May 2010    NAUSEA AND VOMITTING AFTER COLONOSCOPY       Past Surgical History:   Procedure Laterality Date    PB MASTECTOMY, PARTIAL Left 2023    Procedure: LEFT PARTIAL MASTECTOMY, LEFT SENTINEL LYMPH NODE INJECTION WITH EXCISION OF AXILLARY NODES;  Surgeon: Lubna Donaldson M.D.;  Location: SURGERY SAME DAY [facility];  Service: General    NODE BIOPSY SENTINEL Left 2023    Procedure: BIOPSY, LYMPH NODE, SENTINEL;  Surgeon: Lubna Donaldson M.D.;  Location: SURGERY SAME DAY [facility];  Service: General    ORIF, ELBOW Right 2008    OTHER ORTHOPEDIC SURGERY  MAY 2010    FRACTURED RIGHT ELBOW    PRIMARY C SECTION         Social History     Tobacco Use    Smoking status: Never    Smokeless tobacco: Never    Tobacco comments:     Never smoked tobacco   Vaping Use    Vaping Use: Never used   Substance Use Topics    Alcohol use: No    Drug use: No        Family History   Problem Relation Age of Onset    Kidney Disease Father          93    No Known Problems Brother     Breast Cancer Maternal Aunt     Cancer Maternal Aunt         PASSED AWAY FROM BREAST CANCER    Cancer Maternal Aunt         BREAST CANCER - IN REMISSION    Breast Cancer Maternal Aunt     Clotting Disorder Neg Hx     Stroke Neg Hx     Heart Attack Neg Hx     Tubal Cancer Neg Hx     Ovarian Cancer Neg Hx     Peritoneal Cancer Neg Hx     Colorectal Cancer Neg Hx     Diabetes Neg Hx     Heart Disease Neg Hx     Hypertension Neg Hx     Hyperlipidemia Neg Hx        Allergies as of 2023 - Reviewed 2023   Allergen Reaction Noted    Penicillin g Hives 04/10/2013    Tetracycline Vomiting  "04/10/2013         Current Outpatient Medications:     Multiple Vitamins-Minerals (MULTIVITAMIN ADULT PO), Take  by mouth., Disp: , Rfl:     Review of Systems:  Review of Systems   Constitutional: Negative.    HENT: Negative.     Eyes: Negative.    Respiratory: Negative.     Cardiovascular: Negative.    Gastrointestinal: Negative.    Genitourinary: Negative.    Musculoskeletal: Negative.    Skin: Negative.    Neurological:  Positive for focal weakness and weakness.   Endo/Heme/Allergies: Negative.    Psychiatric/Behavioral: Negative.          Physical Exam:  Vitals:    04/20/23 1410   BP: (!) 163/94   Pulse: 81   Resp: 18   Temp: 37.6 °C (99.6 °F)   TempSrc: Temporal   SpO2: 95%   Weight: (!) 227 kg (500 lb 7.1 oz)   Height: 1.626 m (5' 4\")       DESC; KARNOFSKY SCALE WITH ECOG EQUIVALENT: 70, Cares for self; unable to carry on normal activity or to do active work (ECOG equivalent 1)    DISTRESS LEVEL: no acute distress    Physical Exam  Constitutional:       Appearance: Normal appearance.   HENT:      Head: Normocephalic.      Mouth/Throat:      Mouth: Mucous membranes are moist.      Pharynx: Oropharynx is clear.   Eyes:      Extraocular Movements: Extraocular movements intact.      Conjunctiva/sclera: Conjunctivae normal.      Pupils: Pupils are equal, round, and reactive to light.   Cardiovascular:      Rate and Rhythm: Normal rate and regular rhythm.      Pulses: Normal pulses.   Pulmonary:      Effort: Pulmonary effort is normal.      Breath sounds: Normal breath sounds.   Chest:      Comments: Left breast shows a 3 x 3 cm mass at 230 o'clock 7 cm from the nipple which is firm to hard.  No left axillary or supraclavicular adenopathy is noted.  The right breast is normal.  Abdominal:      General: Abdomen is flat. Bowel sounds are normal.      Palpations: Abdomen is soft. There is no mass.   Musculoskeletal:         General: Normal range of motion.   Skin:     General: Skin is warm.   Neurological:      Mental " Status: She is alert and oriented to person, place, and time.      Motor: Weakness present.      Deep Tendon Reflexes:      Reflex Scores:       Tricep reflexes are 0 on the right side.       Bicep reflexes are 0 on the right side.       Patellar reflexes are 0 on the right side.       Achilles reflexes are 0 on the right side.     Comments: Severe right-sided weakness is noted secondary to postpolio syndrome   Psychiatric:         Mood and Affect: Mood normal.         Behavior: Behavior normal.        Labs:  Hospital Outpatient Visit on 03/06/2023   Component Date Value Ref Range Status    Pathology Request 03/06/2023 Sent to Histo   Final       Imaging:   All listed images below have been independently reviewed by me. I agree with the findings as summarized below:      US-BREAST LIMITED-LEFT    Result Date: 3/6/2023  3/6/2023 10:50 AM HISTORY/REASON FOR EXAM: Palpable lump TECHNIQUE/EXAM DESCRIPTION AND NUMBER OF VIEWS: Bilateral tomosynthesis diagnostic mammography was performed with standard mammographic images generated from the data set. Images were reviewed and interpreted with CAD. Left breast ultrasound was then performed COMPARISON: 6/10/2015 FINDINGS: There are scattered areas of fibroglandular density. There is a new 3 cm spiculated palpable mass with architectural distortion in the upper outer left breast 7 cm from the nipple. No axillary adenopathy is seen. Right breast has no interval change with some punctate and coarse calcifications again noted. Left breast ultrasound at the palpable area in question, 3:00, 6 cm in the nipple confirms a hypoechoic spiculated irregular mass with abundant vascularity. The mass measures 27 x 24 x 20 mm. Review of the axilla shows no enlarged lymph node     Palpable 3:00 2.7 cm left breast mass is highly worrisome and ultrasound-guided biopsy with Fiona reflector placement is recommended No evidence of lymphadenopathy Right breast benign findings These findings were  discussed with the patient and all questions were addressed.   She is meeting with our biopsy coordinator at this time. R5- CATEGORY 5:  HIGHLY SUGGESTIVE OF MALIGNANCY - APPROPRIATE ACTION SHOULD BE TAKEN Ten to twenty percent of all cancers can be categorized as hereditary and the clinical and financial value of identifying patients and families at risk is well documented. If you have a personal or family history of breast, ovarian, fallopian tube, peritoneal or other cancer, please consult your physician regarding genetic counseling and testing.     MA-DIAGNOSTIC MAMMO BILAT W/TOMOSYNTHESIS W/CAD    Result Date: 3/6/2023  3/6/2023 10:50 AM HISTORY/REASON FOR EXAM: Palpable lump TECHNIQUE/EXAM DESCRIPTION AND NUMBER OF VIEWS: Bilateral tomosynthesis diagnostic mammography was performed with standard mammographic images generated from the data set. Images were reviewed and interpreted with CAD. Left breast ultrasound was then performed COMPARISON: 6/10/2015 FINDINGS: There are scattered areas of fibroglandular density. There is a new 3 cm spiculated palpable mass with architectural distortion in the upper outer left breast 7 cm from the nipple. No axillary adenopathy is seen. Right breast has no interval change with some punctate and coarse calcifications again noted. Left breast ultrasound at the palpable area in question, 3:00, 6 cm in the nipple confirms a hypoechoic spiculated irregular mass with abundant vascularity. The mass measures 27 x 24 x 20 mm. Review of the axilla shows no enlarged lymph node     Palpable 3:00 2.7 cm left breast mass is highly worrisome and ultrasound-guided biopsy with Fiona reflector placement is recommended No evidence of lymphadenopathy Right breast benign findings These findings were discussed with the patient and all questions were addressed.   She is meeting with our biopsy coordinator at this time. R5- CATEGORY 5:  HIGHLY SUGGESTIVE OF MALIGNANCY - APPROPRIATE ACTION SHOULD BE  TAKEN Ten to twenty percent of all cancers can be categorized as hereditary and the clinical and financial value of identifying patients and families at risk is well documented. If you have a personal or family history of breast, ovarian, fallopian tube, peritoneal or other cancer, please consult your physician regarding genetic counseling and testing.       Pathology:      Assessment & Plan:    1.  Invasive ductal carcinoma of the left breast, grade 3, clinically stage IIa (cT2, cN0) ER +95%, NJ negative, HER2 IHC 2+ FISH negative, Ki-67 30%.  Status post left partial mastectomy and sentinel lymph node biopsy for invasive ductal carcinoma, grade 3, stage IIb (pT2, PN 1 LEXI).  Oncotype DX recurrence score 39.  She is a good candidate for adjuvant chemotherapy.  Due to her postpolio syndrome would avoid taxanes and I am planning AC x4 if her echocardiogram is normal.  2.  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  We will asked Dr. Donaldson to place a Port-A-Cath.  She will return in 1 week for chemo education and plan to initiate chemotherapy the week after.  Any questions and concerns raised by the patient were answered to the best of my ability. Thank you for allowing me to participate in the care for this patient. Please feel free to contact me for any questions or concerns.     Ludin Zaman M.D.

## 2023-04-21 ENCOUNTER — PATIENT OUTREACH (OUTPATIENT)
Dept: ONCOLOGY | Facility: MEDICAL CENTER | Age: 73
End: 2023-04-21
Payer: MEDICARE

## 2023-04-21 ENCOUNTER — PATIENT MESSAGE (OUTPATIENT)
Dept: ONCOLOGY | Facility: MEDICAL CENTER | Age: 73
End: 2023-04-21
Payer: MEDICARE

## 2023-04-21 NOTE — ADDENDUM NOTE
Encounter addended by: Ludin Zaman M.D. on: 4/21/2023 4:28 PM   Actions taken: Order list changed, Diagnosis association updated

## 2023-04-21 NOTE — PROGRESS NOTES
Oncology Nurse Navigation (ONN):  NIA Garrido reached out to patient r/t new patient referral.  No answer.  ONN left a voice message with my contact information encouraging the patient and to reach out if I can be of assistance.  NIA Garrido [facility] into [facility] and sent Oncology Nurse Navigator Introduction Letter & [facility] to patient's MyChart & personal email.      BARRIERS ASSESSMENT:  No barrier assessment or DST completed, ONN was unable to speak with patient.      INTERVENTION:  ONN introduction letter and [facility] sent to patient today.

## 2023-04-24 VITALS
RESPIRATION RATE: 18 BRPM | BODY MASS INDEX: 35.51 KG/M2 | DIASTOLIC BLOOD PRESSURE: 94 MMHG | WEIGHT: 208 LBS | HEART RATE: 81 BPM | SYSTOLIC BLOOD PRESSURE: 163 MMHG | HEIGHT: 64 IN | TEMPERATURE: 99.6 F | OXYGEN SATURATION: 95 %

## 2023-04-24 NOTE — ADDENDUM NOTE
Encounter addended by: Ashley Dover, Med Ass't on: 4/24/2023 7:31 AM   Actions taken: Vitals modified

## 2023-04-25 ENCOUNTER — HOSPITAL ENCOUNTER (OUTPATIENT)
Dept: CARDIOLOGY | Facility: MEDICAL CENTER | Age: 73
End: 2023-04-25
Attending: INTERNAL MEDICINE
Payer: MEDICARE

## 2023-04-25 ENCOUNTER — PRE-ADMISSION TESTING (OUTPATIENT)
Dept: ADMISSIONS | Facility: MEDICAL CENTER | Age: 73
End: 2023-04-25
Attending: INTERNAL MEDICINE
Payer: MEDICARE

## 2023-04-25 DIAGNOSIS — C50.212 MALIGNANT NEOPLASM OF UPPER-INNER QUADRANT OF LEFT BREAST IN FEMALE, ESTROGEN RECEPTOR POSITIVE (HCC): ICD-10-CM

## 2023-04-25 DIAGNOSIS — Z17.0 MALIGNANT NEOPLASM OF UPPER-INNER QUADRANT OF LEFT BREAST IN FEMALE, ESTROGEN RECEPTOR POSITIVE (HCC): ICD-10-CM

## 2023-04-25 PROCEDURE — 93306 TTE W/DOPPLER COMPLETE: CPT

## 2023-04-25 PROCEDURE — 700117 HCHG RX CONTRAST REV CODE 255: Performed by: INTERNAL MEDICINE

## 2023-04-25 RX ADMIN — HUMAN ALBUMIN MICROSPHERES AND PERFLUTREN 3 ML: 10; .22 INJECTION, SOLUTION INTRAVENOUS at 15:59

## 2023-04-26 ENCOUNTER — APPOINTMENT (OUTPATIENT)
Dept: HEMATOLOGY ONCOLOGY | Facility: MEDICAL CENTER | Age: 73
End: 2023-04-26
Payer: MEDICARE

## 2023-04-26 LAB
LV EJECT FRACT  99904: 75
LV EJECT FRACT MOD 2C 99903: 79.99
LV EJECT FRACT MOD 4C 99902: 65.1
LV EJECT FRACT MOD BP 99901: 74.33

## 2023-04-26 PROCEDURE — 93306 TTE W/DOPPLER COMPLETE: CPT | Mod: 26 | Performed by: INTERNAL MEDICINE

## 2023-04-28 ENCOUNTER — APPOINTMENT (OUTPATIENT)
Dept: RADIOLOGY | Facility: MEDICAL CENTER | Age: 73
End: 2023-04-28
Attending: INTERNAL MEDICINE
Payer: MEDICARE

## 2023-04-28 ENCOUNTER — HOSPITAL ENCOUNTER (OUTPATIENT)
Dept: HEMATOLOGY ONCOLOGY | Facility: MEDICAL CENTER | Age: 73
End: 2023-04-28
Attending: INTERNAL MEDICINE | Admitting: INTERNAL MEDICINE
Payer: MEDICARE

## 2023-04-28 ENCOUNTER — HOSPITAL ENCOUNTER (OUTPATIENT)
Facility: MEDICAL CENTER | Age: 73
End: 2023-04-28
Attending: INTERNAL MEDICINE | Admitting: INTERNAL MEDICINE
Payer: MEDICARE

## 2023-04-28 VITALS
WEIGHT: 200 LBS | OXYGEN SATURATION: 95 % | HEIGHT: 64 IN | BODY MASS INDEX: 34.15 KG/M2 | DIASTOLIC BLOOD PRESSURE: 68 MMHG | HEART RATE: 86 BPM | RESPIRATION RATE: 16 BRPM | SYSTOLIC BLOOD PRESSURE: 151 MMHG

## 2023-04-28 DIAGNOSIS — Z17.0 MALIGNANT NEOPLASM OF UPPER-INNER QUADRANT OF LEFT BREAST IN FEMALE, ESTROGEN RECEPTOR POSITIVE (HCC): ICD-10-CM

## 2023-04-28 DIAGNOSIS — C50.212 MALIGNANT NEOPLASM OF UPPER-INNER QUADRANT OF LEFT BREAST IN FEMALE, ESTROGEN RECEPTOR POSITIVE (HCC): ICD-10-CM

## 2023-04-28 PROCEDURE — C1894 INTRO/SHEATH, NON-LASER: HCPCS

## 2023-04-28 PROCEDURE — 700111 HCHG RX REV CODE 636 W/ 250 OVERRIDE (IP)

## 2023-04-28 RX ORDER — PROCHLORPERAZINE MALEATE 10 MG
10 TABLET ORAL EVERY 6 HOURS PRN
Qty: 30 TABLET | Refills: 6 | Status: SHIPPED | OUTPATIENT
Start: 2023-04-28 | End: 2023-08-08

## 2023-04-28 RX ORDER — OXYCODONE HYDROCHLORIDE 10 MG/1
10 TABLET ORAL
Status: DISCONTINUED | OUTPATIENT
Start: 2023-04-28 | End: 2023-04-28 | Stop reason: HOSPADM

## 2023-04-28 RX ORDER — PROCHLORPERAZINE MALEATE 10 MG
10 TABLET ORAL EVERY 6 HOURS PRN
Qty: 30 TABLET | Refills: 6 | Status: CANCELLED | OUTPATIENT
Start: 2023-05-02

## 2023-04-28 RX ORDER — LIDOCAINE HYDROCHLORIDE AND EPINEPHRINE BITARTRATE 20; .01 MG/ML; MG/ML
INJECTION, SOLUTION SUBCUTANEOUS
Status: DISCONTINUED
Start: 2023-04-28 | End: 2023-04-28 | Stop reason: HOSPADM

## 2023-04-28 RX ORDER — ONDANSETRON 2 MG/ML
INJECTION INTRAMUSCULAR; INTRAVENOUS
Status: DISCONTINUED
Start: 2023-04-28 | End: 2023-04-28 | Stop reason: HOSPADM

## 2023-04-28 RX ORDER — CEFAZOLIN SODIUM 1 G/3ML
INJECTION, POWDER, FOR SOLUTION INTRAMUSCULAR; INTRAVENOUS
Status: COMPLETED
Start: 2023-04-28 | End: 2023-04-28

## 2023-04-28 RX ORDER — MIDAZOLAM HYDROCHLORIDE 1 MG/ML
.5-2 INJECTION INTRAMUSCULAR; INTRAVENOUS PRN
Status: DISCONTINUED | OUTPATIENT
Start: 2023-04-28 | End: 2023-04-28

## 2023-04-28 RX ORDER — MIDAZOLAM HYDROCHLORIDE 1 MG/ML
INJECTION INTRAMUSCULAR; INTRAVENOUS
Status: COMPLETED
Start: 2023-04-28 | End: 2023-04-28

## 2023-04-28 RX ORDER — LIDOCAINE AND PRILOCAINE 25; 25 MG/G; MG/G
CREAM TOPICAL
Qty: 30 G | Refills: 3 | Status: CANCELLED | OUTPATIENT
Start: 2023-05-02

## 2023-04-28 RX ORDER — ONDANSETRON 4 MG/1
4 TABLET, FILM COATED ORAL EVERY 4 HOURS PRN
Qty: 30 TABLET | Refills: 6 | Status: CANCELLED | OUTPATIENT
Start: 2023-05-02

## 2023-04-28 RX ORDER — ONDANSETRON 4 MG/1
TABLET, ORALLY DISINTEGRATING ORAL
Status: DISCONTINUED
Start: 2023-04-28 | End: 2023-04-28 | Stop reason: HOSPADM

## 2023-04-28 RX ORDER — LIDOCAINE AND PRILOCAINE 25; 25 MG/G; MG/G
CREAM TOPICAL
Qty: 30 G | Refills: 3 | Status: SHIPPED
Start: 2023-04-28 | End: 2023-12-13

## 2023-04-28 RX ORDER — ONDANSETRON 4 MG/1
4 TABLET, FILM COATED ORAL EVERY 4 HOURS PRN
Qty: 30 TABLET | Refills: 6 | Status: SHIPPED | OUTPATIENT
Start: 2023-04-28 | End: 2023-08-08

## 2023-04-28 RX ORDER — ONDANSETRON 2 MG/ML
4 INJECTION INTRAMUSCULAR; INTRAVENOUS EVERY 8 HOURS PRN
Status: DISCONTINUED | OUTPATIENT
Start: 2023-04-28 | End: 2023-04-28 | Stop reason: HOSPADM

## 2023-04-28 RX ORDER — SODIUM CHLORIDE 9 MG/ML
500 INJECTION, SOLUTION INTRAVENOUS
Status: DISCONTINUED | OUTPATIENT
Start: 2023-04-28 | End: 2023-04-28 | Stop reason: HOSPADM

## 2023-04-28 RX ORDER — OXYCODONE HYDROCHLORIDE 5 MG/1
5 TABLET ORAL
Status: DISCONTINUED | OUTPATIENT
Start: 2023-04-28 | End: 2023-04-28 | Stop reason: HOSPADM

## 2023-04-28 RX ADMIN — MIDAZOLAM HYDROCHLORIDE 1 MG: 1 INJECTION INTRAMUSCULAR; INTRAVENOUS at 14:52

## 2023-04-28 RX ADMIN — FENTANYL CITRATE 50 MCG: 50 INJECTION, SOLUTION INTRAMUSCULAR; INTRAVENOUS at 15:03

## 2023-04-28 RX ADMIN — FENTANYL CITRATE 50 MCG: 50 INJECTION, SOLUTION INTRAMUSCULAR; INTRAVENOUS at 14:52

## 2023-04-28 RX ADMIN — MIDAZOLAM HYDROCHLORIDE 1 MG: 1 INJECTION, SOLUTION INTRAMUSCULAR; INTRAVENOUS at 15:03

## 2023-04-28 RX ADMIN — CEFAZOLIN 2 G: 330 INJECTION, POWDER, FOR SOLUTION INTRAMUSCULAR; INTRAVENOUS at 15:00

## 2023-04-28 RX ADMIN — MIDAZOLAM HYDROCHLORIDE 1 MG: 1 INJECTION INTRAMUSCULAR; INTRAVENOUS at 15:03

## 2023-04-28 RX ADMIN — MIDAZOLAM HYDROCHLORIDE 1 MG: 1 INJECTION, SOLUTION INTRAMUSCULAR; INTRAVENOUS at 14:52

## 2023-04-28 ASSESSMENT — FIBROSIS 4 INDEX
FIB4 SCORE: 1.190103216423260314

## 2023-04-28 NOTE — PROGRESS NOTES
IR Nursing Note    Chest port placemdng by MD Calloway assisted by RT Purcell, right upper chest/RIJ access site.  Patient was consented by MD and confirmed by this RN. Procedure site was marked by MD and verified using imaging guidance.  Patient was placed in a supine position.  Patient was prepped and draped in a sterile fashion. Vitals were taken every 5 minutes and continues CO2 waveform capnography, all remained stable during procedure (see doc flow sheet for results).   A dermabond, gauze, and tegaderm dressing placed over surgical site.     Procedure RN to recover patient    BARD PowerPort clearVUE isp implantable port, 8 Fr:  REF:[id number]  LOT:[id number]  EXP:  2024-11-30

## 2023-04-28 NOTE — PROGRESS NOTES
The following appointments, labs, and medications have been provided to the patient:  Line: Port placement planned for 4/28  ECHO: Completed   WBC Support (g-csf): NA  Labs: CBC w/diff, CMP  Follow up appts: 5/10/2023  Chemo/immunotherapy class: Completed 4/28/2023  Nausea medication: zofran/compazine prescribed   Other treatment specific meds: EMLA cream  Oral chemo follow up: NA  Pain medication: Per provider discretion  Nurse alejandra: Referred 3/10/2023  Dietician:  SHMUEL  SW: NA  FRA: Referred 4/28/2023    Additional info/teaching for chemotherapy patients:  Neutropenia reminder card provided to patient    Additional teaching:  AC (doxorubicin + cyclophosphamide)      Patient was provided with drug specific handouts and [facility] side effects sheet. Patient verbalized that questions and concerns regarding treatment have been addressed. Consent to proceed with treatment was reviewed and signed during this visit.    Spent 60 minutes of continuous, non-interrupted, face-to-face patient contact in which greater than 50% of the visit was spent counseling and coordinating of care.

## 2023-04-28 NOTE — OR SURGEON
Immediate Post- Operative Note    PostOp Diagnosis: VENOUS ACCESS REQUIRED FOR CHEMOTHERAPY      Procedure(s): RIGHT INTERNAL JUGULAR POWER PORT VENOUS ACCESS PLACEMENT WITH U/S AND FLUOROSCOPIC GUIDANCE    Estimated Blood Loss: <5CC    Complications: NONE          4/28/2023     3:12 PM     Pietro Calloway M.D.

## 2023-05-02 ENCOUNTER — PATIENT OUTREACH (OUTPATIENT)
Dept: ONCOLOGY | Facility: MEDICAL CENTER | Age: 73
End: 2023-05-02
Payer: MEDICARE

## 2023-05-02 NOTE — PROGRESS NOTES
"Pharmacy Chemotherapy Calculation    Dx: Breast cancer         Protocol: AC  Doxorubicin 60 mg/m2 IV over 20 minutes  Cyclophosphamide 600 mg/m2 IV over 30 minutes   21-day cycle for 4 cycles (adjuvant)  NCCN Guidelines for Breast Cancer V.2.2022.  Светлана VANCE, et al. J Clin Oncol. 2003;21(6):968-75.  Kade B et al. J Clin Oncol. 1990;8(9):1483-96.    Allergies:  Penicillin g and Tetracycline       BP (!) 154/79   Pulse 88   Temp 37.1 °C (98.8 °F) (Temporal)   Resp 18   Ht 1.63 m (5' 4.17\")   Wt 106 kg (233 lb 4 oz)   SpO2 95%   BMI 39.82 kg/m²  Body surface area is 2.19 meters squared.    ECHO:  4/25/23: 75%    Labs 5/3/23:  ANC~ 4440 Plt = 272k   Hgb = 13.4     SCr = 0.57 mg/dL CrCl ~ 124 mL/min   AST/ALT/AP = 19/20/77 TBili = 0.3      Drug Order   (Drug name, dose, route, IV Fluid & volume, frequency, number of doses) Cycle: 1      Previous treatment: left partial mastectomy 4/4/23     Medication = Doxorubicin  Base Dose= 60 mg/m2  Calc Dose: Base Dose x 2.19 m2 = 131.4 mg  Final Dose = 130 mg  Route = IV  Drug conc. = 2 mg/mL  Fluid & Volume = 65 mL in empty bag  Admin Duration = Over 20 minutes          <10% difference, OK to treat with final dose   Medication = Cyclophosphamide  Base Dos]e = 600 mg/m2  Calc Dose: Base Dose x 2.19 m2 = 1314 mg  Final Dose = 1300 mg  Route = IV  Fluid & Volume =  mL  Admin Duration = Over 30-60 minutes          <10% difference, OK to treat with final dose     By my signature below, I confirm this process was performed independently with the BSA and all final chemotherapy dosing calculations congruent. I have reviewed the above chemotherapy order and that my calculation of the final dose and BSA (when applicable) corroborate those calculations of the  pharmacist. Discrepancies of 10% or greater in the written dose have been addressed and documented within the [facility] Progress notes.    Ángel Hedrick, PharmD  "

## 2023-05-03 ENCOUNTER — DOCUMENTATION (OUTPATIENT)
Dept: ONCOLOGY | Facility: MEDICAL CENTER | Age: 73
End: 2023-05-03
Payer: MEDICARE

## 2023-05-03 ENCOUNTER — OUTPATIENT INFUSION SERVICES (OUTPATIENT)
Dept: ONCOLOGY | Facility: MEDICAL CENTER | Age: 73
End: 2023-05-03
Attending: INTERNAL MEDICINE
Payer: MEDICARE

## 2023-05-03 VITALS
RESPIRATION RATE: 18 BRPM | DIASTOLIC BLOOD PRESSURE: 79 MMHG | OXYGEN SATURATION: 95 % | TEMPERATURE: 98.8 F | BODY MASS INDEX: 39.82 KG/M2 | WEIGHT: 233.25 LBS | HEART RATE: 88 BPM | SYSTOLIC BLOOD PRESSURE: 154 MMHG | HEIGHT: 64 IN

## 2023-05-03 DIAGNOSIS — Z17.0 MALIGNANT NEOPLASM OF UPPER-INNER QUADRANT OF LEFT BREAST IN FEMALE, ESTROGEN RECEPTOR POSITIVE (HCC): ICD-10-CM

## 2023-05-03 DIAGNOSIS — C50.212 MALIGNANT NEOPLASM OF UPPER-INNER QUADRANT OF LEFT BREAST IN FEMALE, ESTROGEN RECEPTOR POSITIVE (HCC): ICD-10-CM

## 2023-05-03 LAB
ALBUMIN SERPL BCP-MCNC: 4 G/DL (ref 3.2–4.9)
ALBUMIN/GLOB SERPL: 1.5 G/DL
ALP SERPL-CCNC: 77 U/L (ref 30–99)
ALT SERPL-CCNC: 20 U/L (ref 2–50)
ANION GAP SERPL CALC-SCNC: 12 MMOL/L (ref 7–16)
AST SERPL-CCNC: 19 U/L (ref 12–45)
BASOPHILS # BLD AUTO: 0.5 % (ref 0–1.8)
BASOPHILS # BLD: 0.04 K/UL (ref 0–0.12)
BILIRUB SERPL-MCNC: 0.3 MG/DL (ref 0.1–1.5)
BUN SERPL-MCNC: 16 MG/DL (ref 8–22)
CALCIUM ALBUM COR SERPL-MCNC: 8.8 MG/DL (ref 8.5–10.5)
CALCIUM SERPL-MCNC: 8.8 MG/DL (ref 8.5–10.5)
CHLORIDE SERPL-SCNC: 107 MMOL/L (ref 96–112)
CO2 SERPL-SCNC: 22 MMOL/L (ref 20–33)
CREAT SERPL-MCNC: 0.57 MG/DL (ref 0.5–1.4)
EOSINOPHIL # BLD AUTO: 0.14 K/UL (ref 0–0.51)
EOSINOPHIL NFR BLD: 1.8 % (ref 0–6.9)
ERYTHROCYTE [DISTWIDTH] IN BLOOD BY AUTOMATED COUNT: 42.7 FL (ref 35.9–50)
GFR SERPLBLD CREATININE-BSD FMLA CKD-EPI: 97 ML/MIN/1.73 M 2
GLOBULIN SER CALC-MCNC: 2.7 G/DL (ref 1.9–3.5)
GLUCOSE SERPL-MCNC: 108 MG/DL (ref 65–99)
HCT VFR BLD AUTO: 41.4 % (ref 37–47)
HGB BLD-MCNC: 13.4 G/DL (ref 12–16)
IMM GRANULOCYTES # BLD AUTO: 0.02 K/UL (ref 0–0.11)
IMM GRANULOCYTES NFR BLD AUTO: 0.3 % (ref 0–0.9)
LYMPHOCYTES # BLD AUTO: 2.62 K/UL (ref 1–4.8)
LYMPHOCYTES NFR BLD: 33.4 % (ref 22–41)
MCH RBC QN AUTO: 28.1 PG (ref 27–33)
MCHC RBC AUTO-ENTMCNC: 32.4 G/DL (ref 33.6–35)
MCV RBC AUTO: 86.8 FL (ref 81.4–97.8)
MONOCYTES # BLD AUTO: 0.58 K/UL (ref 0–0.85)
MONOCYTES NFR BLD AUTO: 7.4 % (ref 0–13.4)
NEUTROPHILS # BLD AUTO: 4.44 K/UL (ref 2–7.15)
NEUTROPHILS NFR BLD: 56.6 % (ref 44–72)
NRBC # BLD AUTO: 0 K/UL
NRBC BLD-RTO: 0 /100 WBC
OUTPT INFUS CBC COMMENT OICOM: ABNORMAL
PLATELET # BLD AUTO: 272 K/UL (ref 164–446)
PMV BLD AUTO: 9.8 FL (ref 9–12.9)
POTASSIUM SERPL-SCNC: 3.7 MMOL/L (ref 3.6–5.5)
PROT SERPL-MCNC: 6.7 G/DL (ref 6–8.2)
RBC # BLD AUTO: 4.77 M/UL (ref 4.2–5.4)
SODIUM SERPL-SCNC: 141 MMOL/L (ref 135–145)
WBC # BLD AUTO: 7.8 K/UL (ref 4.8–10.8)

## 2023-05-03 PROCEDURE — A4212 NON CORING NEEDLE OR STYLET: HCPCS

## 2023-05-03 PROCEDURE — 96417 CHEMO IV INFUS EACH ADDL SEQ: CPT

## 2023-05-03 PROCEDURE — 96413 CHEMO IV INFUSION 1 HR: CPT

## 2023-05-03 PROCEDURE — 700111 HCHG RX REV CODE 636 W/ 250 OVERRIDE (IP): Performed by: INTERNAL MEDICINE

## 2023-05-03 PROCEDURE — 80053 COMPREHEN METABOLIC PANEL: CPT

## 2023-05-03 PROCEDURE — 96375 TX/PRO/DX INJ NEW DRUG ADDON: CPT

## 2023-05-03 PROCEDURE — 85025 COMPLETE CBC W/AUTO DIFF WBC: CPT

## 2023-05-03 PROCEDURE — 96367 TX/PROPH/DG ADDL SEQ IV INF: CPT

## 2023-05-03 PROCEDURE — 700105 HCHG RX REV CODE 258: Performed by: INTERNAL MEDICINE

## 2023-05-03 RX ADMIN — ONDANSETRON HYDROCHLORIDE 16 MG: 2 INJECTION, SOLUTION INTRAMUSCULAR; INTRAVENOUS at 09:50

## 2023-05-03 RX ADMIN — CYCLOPHOSPHAMIDE 1300 MG: 200 INJECTION, SOLUTION INTRAVENOUS at 11:20

## 2023-05-03 RX ADMIN — DEXAMETHASONE SODIUM PHOSPHATE 12 MG: 4 INJECTION, SOLUTION INTRAMUSCULAR; INTRAVENOUS at 09:40

## 2023-05-03 RX ADMIN — FOSAPREPITANT 150 MG: 150 INJECTION, POWDER, LYOPHILIZED, FOR SOLUTION INTRAVENOUS at 10:13

## 2023-05-03 RX ADMIN — DOXORUBICIN HYDROCHLORIDE 130 MG: 2 INJECTION, SOLUTION INTRAVENOUS at 10:47

## 2023-05-03 RX ADMIN — HEPARIN 500 UNITS: 100 SYRINGE at 12:35

## 2023-05-03 ASSESSMENT — FIBROSIS 4 INDEX: FIB4 SCORE: 1.190103216423260314

## 2023-05-03 NOTE — PROGRESS NOTES
Chemotherapy Verification - SECONDARY RN       Height = 163 cm  Weight = 106 kg  BSA = 2.19 m2       Medication: Adriamycin  Dose: 60 mg/m2  Calculated Dose: 131.4 mg                             (In mg/m2, AUC, mg/kg)     Medication: Cytoxan  Dose: 60 mg/m2  Calculated Dose: 1314 mg                             (In mg/m2, AUC, mg/kg)    I confirm that this process was performed independently.

## 2023-05-03 NOTE — PROGRESS NOTES
Chemotherapy Verification - PRIMARY RN    Day 1 Cycle 1    Echo 4/25/23 75%      Height = 1.63 mm  Weight = 106 kg  BSA = 2.19 m2       Medication: Adriamycin  Dose: 60mg/m2  Calculated Dose: 131.4 mg                             (In mg/m2, AUC, mg/kg)     Medication: cyclophosphamide  Dose: 600mg/m2  Calculated Dose: 1314 mg                             (In mg/m2, AUC, mg/kg)          I confirm this process was performed independently with the BSA and all final chemotherapy dosing calculations congruent.  Any discrepancies of 10% or greater have been addressed with the chemotherapy pharmacist. The resolution of the discrepancy has been documented in the EPIC progress notes.

## 2023-05-03 NOTE — PROGRESS NOTES
Nutrition Services: RD Consultation/ New chemotherapy Start  Donna Palmer is a 72 y.o. female with diagnosis of malignant neoplasm of upper inner quadrant of left breast: Stage IIB: cT2, cN0,cM0, G3, ER+, MO-, HER2-    RD met with pt to assess current intake, appetite, and nutritional status.  Pt presents to appointment with scooter. Pt states has had by the age of 12 related to postpolio syndrome. States is able to cook, shower, and use bathroom by herself, though unable to clean home or drive. Pt states does have low muscle tone, not currently visiting with OT/PT, though has in the past for an elbow injury. Denies any current n/v/d/c or changes to appetite. Pt did not express any further nutrition-related questions or concerns at this time.     Dietary intake pattern:  Denies taking herbs, takes general multivitamin. No changes to appetite, asks about dairy and is currently drinking 1 cup of soymilk each day.     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[facility]) 1962    PONV (postoperative nausea and vomiting) May 2010    NAUSEA AND VOMITTING AFTER COLONOSCOPY       Past Surgical History:   Procedure Laterality Date    PB MASTECTOMY, PARTIAL Left 4/4/2023    Procedure: LEFT PARTIAL MASTECTOMY, LEFT SENTINEL LYMPH NODE INJECTION WITH EXCISION OF AXILLARY NODES;  Surgeon: Lubna Donaldson M.D.;  Location: SURGERY SAME DAY [facility];  Service: General    NODE BIOPSY SENTINEL Left 4/4/2023    Procedure: BIOPSY, LYMPH NODE, SENTINEL;  Surgeon: Lubna Donaldson M.D.;  Location: SURGERY SAME DAY [facility];  Service: General    ORIF, ELBOW Right 09/29/2008    OTHER ORTHOPEDIC SURGERY  MAY 2010    FRACTURED RIGHT ELBOW    PRIMARY C SECTION         Biochemical/ Anthropometric Assessment:  Treatment  "Regimen: Doxorubicin+cytoxan with Dr. Zaman    Pertinent Labs: Glucose 108  Pertinent Meds: zofran, compazine, MVI  Wt Readings from Last 1 Encounters:   05/03/23 106 kg (233 lb 4 oz)      Ht Readings from Last 1 Encounters:   05/03/23 1.63 m (5' 4.17\")      BMI Readings from Last 1 Encounters:   05/03/23 39.82 kg/m²   BMI Classification: Obesity Class II  Nutrition-Focused Physical Exam: Anticipate low muscle stores related to postpolio syndrome     Weight History:  Wt Readings from Last 6 Encounters:   05/03/23 106 kg (233 lb 4 oz)   04/28/23 90.7 kg (200 lb)   04/20/23 94.3 kg (208 lb)   04/04/23 103 kg (227 lb 1.2 oz)   03/29/23 90.7 kg (200 lb)   09/30/22 90.7 kg (200 lb)     Weight Change/Malnutrition Risk: RD questions accuracy of current weight as presents as an outlier to wt trends overall. RD to monitor wt via same scale to maintain consistency and reduce error attributed to scale variability.     Interventions:  Introduced self and discussed role of dietitian throughout treatment process   Encouraged balanced diet with plant-based focus. Verbalized importance of nutrition and maintaining LBM throughout treatment.   Frequent repositioning in scooter beneficial to minimize risk of pressure injuries.   Increase meal and snack frequency to 4-6 x/day.   Provided contact information and encouraged pt to follow-up as desired. Pt prefers to have RD info via [employer] as well, RD agreed to send for pt's preferred communication.     Pt reports understanding and was receptive to information provided.   RD provided contact information. Encouraged pt to reach out as questions/concerns arise.   RD available PRN   [phone]   "

## 2023-05-03 NOTE — PROGRESS NOTES
Ms Palmer is here today for day 1 cycle 1 of adriamycin and cyclophosphamide.     She has a history of polio from age 12, decreased strength and mobility she uses a scooter and requires assistance from her .     Medi port was placed 4/28/2023, incision is healing well, easily accessed with good blood return. Labs were drawn per orders. Results are within parameters for treatment today.    Premeds were given and tolerated well.     Chemotherapy was given per MAR and tolerated well.    Medi port was flushed and HL. Gauze applied to the site.     Pt education provided and questions answered.     Ms Palmer was discharged in stable condition and left with her spouse.

## 2023-05-03 NOTE — PROGRESS NOTES
"Pharmacy Chemotherapy Verification    Dx: Breast CA  Cycle: 1 (Previous treatment = Surgery)  Regimen and Dosing Reference  DOXOrubicin 60 mg/m2 IV push on Day 1  Cyclophosphamide 600 mg/m2 IV over 30 minutes on Day 1  21 day cycle for 4 cycles (neoadjuvant or adjuvant)  NCCN Guidelines for Breast Cancer.V.2.2022  Светлана VANCE, et al. J Clin Oncol. 2003;21(6):968-75  Kade B, et al. J Clin Oncol. 1990;8(9):1483-96    Allergies:Penicillin g and Tetracycline  BP (!) 154/79   Pulse 88   Temp 37.1 °C (98.8 °F) (Temporal)   Resp 18   Ht 1.63 m (5' 4.17\")   Wt 106 kg (233 lb 4 oz)   SpO2 95%   BMI 39.82 kg/m²   Body surface area is 2.19 meters squared.    Labs 5/3/23  ANC 4440 Hgb 13.4 Plt 272k  SCr 0.57 CrCl 124 mL/min   AST/ALT/AP = 19/20/77 Tbili = 0.3    ECHO 4/25/23  LVEF 75%    DOXOrubicin 60 mg/m2 x 2.19 m2 = 131.4 mg   <10% difference, OK to treat with final dose = 130 mg IV    Cyclophosphamide 600 mg/m2 x 2.19 m2 = 1314 mg   <10% difference, OK to treat with final dose = 1300 mg IV    Stephanie Zhou, PharmD, BCOP      "

## 2023-05-09 ENCOUNTER — PATIENT OUTREACH (OUTPATIENT)
Dept: ONCOLOGY | Facility: MEDICAL CENTER | Age: 73
End: 2023-05-09
Payer: MEDICARE

## 2023-05-09 NOTE — PROGRESS NOTES
"Call received from Dr. Mendoza Thomas's office regarding patient.   Dr. Donaldson ordered a radiation therapy consult & the department has reached out to schedule, but the patient has not responded to their calls.  Request for Oncology Nurse Navigator Nohemi Garrido to contact patient regarding same.      5/2/23 @ 0905.  NIA Garrido called patient at [phone].  Patient stated she had not returned the calls from radiation therapy because she had a port placed last week, is starting chemotherapy, and wanted to handle one thing at a time.  NIA explained that the consultation visit is important because each individual patient's plan of care is different and our goal is not to delay patient care.  ONRUBEN Garrido explained that the consultation will be to review all findings and results since diagnosis and review the potential role of radiation therapy in her plan of care.  Patient verbalized understanding.  Patient shared she is scheduled for chemotherapy 5/3/23.  She was instructed to remove the port placement tape and apply a EMLA cream but the tape she did remove caused a skin tear.  ONN advised her to leave the remaining tape until see in the [facility] tomorrow and to keep the skin tear clean & dry.  ONN advised patient the [facility] nurses will assist with removing the tape and will apply a topical numbing medication to the skin above the port reservoir prior to accessing the port.    Patient asked if she should take a dose of Compazine or Zofran prior to coming in to [facility] tomorrow.  ONN encouraged her to bring her medications, but if not experiencing nausea to discussing any doses with the [facility] nurses as their may be an order to pre-medicate with an anti-nausea drug within the chemotherapy order set.  Patient voiced concerns that Compazine, prescribed to her for nausea, is \"prescribed for schizophrenia\".  ONN advised the patient the Zofran and Compazine prescribed by her medical oncologist if for nausea & vomiting and is very " commonly prescribed and effective in treating those symptoms.  ONN advised patient to hold discuss the compazine with the infusion team tomorrow before taking it.  Patient has filled the Zofran prescription, verbalizes understanding it is also used to treat nausea and vomiting, but denies any at this time.    ONN has answered the patients current questions and encouraged her to call back for any additional questions or assistance.

## 2023-05-09 NOTE — PROGRESS NOTES
Oncology Nurse Navigator reached out to patient at [phone].  No answer.  ONN left voice message encouraging patient to call back if she would like to meet in person before or after her appointments at [facility] tomorrow.

## 2023-05-10 ENCOUNTER — HOSPITAL ENCOUNTER (OUTPATIENT)
Dept: HEMATOLOGY ONCOLOGY | Facility: MEDICAL CENTER | Age: 73
End: 2023-05-10
Attending: NURSE PRACTITIONER
Payer: MEDICARE

## 2023-05-10 VITALS
HEART RATE: 86 BPM | DIASTOLIC BLOOD PRESSURE: 68 MMHG | TEMPERATURE: 99.7 F | OXYGEN SATURATION: 96 % | RESPIRATION RATE: 18 BRPM | SYSTOLIC BLOOD PRESSURE: 132 MMHG

## 2023-05-10 DIAGNOSIS — Z17.0 MALIGNANT NEOPLASM OF UPPER-INNER QUADRANT OF LEFT BREAST IN FEMALE, ESTROGEN RECEPTOR POSITIVE (HCC): ICD-10-CM

## 2023-05-10 DIAGNOSIS — Z79.899 ENCOUNTER FOR LONG-TERM (CURRENT) USE OF HIGH-RISK MEDICATION: ICD-10-CM

## 2023-05-10 DIAGNOSIS — C50.212 MALIGNANT NEOPLASM OF UPPER-INNER QUADRANT OF LEFT BREAST IN FEMALE, ESTROGEN RECEPTOR POSITIVE (HCC): ICD-10-CM

## 2023-05-10 PROCEDURE — 99214 OFFICE O/P EST MOD 30 MIN: CPT | Performed by: NURSE PRACTITIONER

## 2023-05-10 PROCEDURE — 99212 OFFICE O/P EST SF 10 MIN: CPT | Performed by: NURSE PRACTITIONER

## 2023-05-10 ASSESSMENT — ENCOUNTER SYMPTOMS
SHORTNESS OF BREATH: 0
HEADACHES: 1
PALPITATIONS: 0
NAUSEA: 1
CHILLS: 0
DIAPHORESIS: 0
COUGH: 0
WHEEZING: 0
DIZZINESS: 0
WEIGHT LOSS: 0
FEVER: 0
MYALGIAS: 0
VOMITING: 0
CONSTIPATION: 1
DEPRESSION: 0

## 2023-05-10 ASSESSMENT — PAIN SCALES - GENERAL: PAINLEVEL: NO PAIN

## 2023-05-10 NOTE — PROGRESS NOTES
Subjective     Donna Palmer is a 72 y.o. female who presents with Breast Cancer (Tox check )          HPI    Referring Physician: Lubna Donaldson MD  Primary Care:   [facility] Gallego.     Diagnosis: Ductal carcinoma of the left breast, grade 3, clinically stage IIa (cT2, cN0) ER +95%, TN negative, HER2 IHC 2+ FISH negative, Ki-67 30%     Chief Complaint: Patient seen today for evaluation of her breast cancer, for continued monitoring of symptoms and side effects of cancer treatments, employing AC.    Oncology history of presenting illness:  Patient self detected a mass in her left breast in February 2023.  She had not had a mammogram for several years.  A diagnostic mammogram and ultrasound were completed on 3/6/2023 which showed a 2.7 x 2.4 x 2.0 cm spiculated irregular mass at 3 o'clock position of the left breast with no abnormal lymph nodes being detected.  She underwent an immediate ultrasound-guided biopsy demonstrated invasive ductal carcinoma, grade 3, at least 1.7 cm in the core, ER +95%, TN negative, Ki-67 30%, HER2 2+ IHC FISH negative.  She saw Dr. Donaldson in consultation and is planning to undergo partial mastectomy and sentinel lymph node biopsy.    Her history is notable for post radial syndrome with motor nerve impairment which is gotten gradually worse over the last couple of years.  She now has to use a scooter almost all the time and has started having impairment in activities of daily living.  Otherwise she has no chronic medical problems.  She has a maternal aunt who had breast cancer.  No other breast or ovarian cancer is noted.    Interval histories:  Interval history 4/20/2023: She underwent left partial mastectomy and sentinel lymph node biopsy on 4/4/2023.  Pathology demonstrated invasive ductal carcinoma 3.5 cm in greatest dimension with focal high-grade DCIS.  Margins were clear and there was no lymph vascular invasion.  1 sentinel lymph node was positive for  micrometastases and 1 sentinel lymph node was negative.  Postoperative course was unremarkable but she does feel a firm mass at the incision site which is consistent with a seroma in the breast.  We sent an Oncotype DX which came back with recurrence score of 39.  She has no history of heart problems but does have history of hypertension.    Interval history 05/10/23 (CAlsop, APRN):  Patient seen today for her toxicity check following her first dose of chemotherapy employing AC.  Patient appeared to tolerate treatment very well.  She has not required any antinausea medications.  She had very mild underlying nausea but again no need for medication.  She does complain of constipation which is new with treatment.  She did have a bowel movement last night.  She was quite constipated after treatment and ended up taking milk of magnesia dose on Sunday night which caused diarrhea.  She also noticed a very mild elevation in temperature but that resolved.  She states her appetite has been good and she is eating well.  She has noticed decrease in energy this past week as well.    Treatment history:  04/04/23: Left partial mastectomy and sentinel lymph node biopsy - Dr. Donaldson  05/03/23: C1D1 Adriamycin and Cyclophosphamide      Allergies   Allergen Reactions    Penicillin G Hives     As a child    Tetracycline Vomiting       Current Outpatient Medications on File Prior to Encounter   Medication Sig Dispense Refill    lidocaine-prilocaine (EMLA) 2.5-2.5 % Cream Apply to port site 1 hour prior to port access as needed daily 30 g 3    ondansetron (ZOFRAN) 4 MG Tab tablet Take 1 Tablet by mouth every four hours as needed for Nausea/Vomiting (for nausea, vomiting). 30 Tablet 6    prochlorperazine (COMPAZINE) 10 MG Tab Take 1 Tablet by mouth every 6 hours as needed (for nausea, vomiting). 30 Tablet 6    Multiple Vitamins-Minerals (MULTIVITAMIN ADULT PO) Take  by mouth.       No current facility-administered medications on file prior  "to encounter.        Review of Systems   Constitutional:  Positive for malaise/fatigue. Negative for chills, diaphoresis, fever and weight loss.   Respiratory:  Negative for cough, shortness of breath and wheezing.    Cardiovascular:  Negative for chest pain and palpitations.   Gastrointestinal:  Positive for constipation and nausea (mild). Negative for vomiting.   Genitourinary:  Negative for dysuria.   Musculoskeletal:  Negative for myalgias.   Neurological:  Positive for headaches (low grade HA that despitates). Negative for dizziness.   Psychiatric/Behavioral:  Negative for depression.               Objective     /68 (BP Location: Right arm, Patient Position: Sitting, BP Cuff Size: Adult)   Pulse 86   Temp 37.6 °C (99.7 °F) (Temporal)   Resp 18   Ht (P) 1.63 m (5' 4.17\")   SpO2 96%   BMI (P) 39.82 kg/m²      Physical Exam  Vitals reviewed.   Constitutional:       General: She is not in acute distress.     Appearance: Normal appearance. She is not diaphoretic.   HENT:      Head: Normocephalic and atraumatic.   Cardiovascular:      Rate and Rhythm: Normal rate and regular rhythm.      Heart sounds: Normal heart sounds. No murmur heard.     No friction rub. No gallop.   Pulmonary:      Effort: Pulmonary effort is normal. No respiratory distress.      Breath sounds: Normal breath sounds. No wheezing.   Chest:          Comments: 05/10/23: Mass noted at the incision site, likely a seroma. Measures 4 x 1 cm in size. No redness or tenderness.   03/31/23: Left breast shows a 3 x 3 cm mass at 230 o'clock 7 cm from the nipple which is firm to hard.  No left axillary or supraclavicular adenopathy is noted.  The right breast is normal.  Abdominal:      General: Bowel sounds are normal. There is no distension.      Palpations: Abdomen is soft.      Tenderness: There is no abdominal tenderness.   Musculoskeletal:      Comments: Patient wheelchair bound   Skin:     General: Skin is warm and dry.   Neurological:      " Mental Status: She is alert and oriented to person, place, and time.   Psychiatric:         Mood and Affect: Mood normal.         Behavior: Behavior normal.                       Assessment & Plan       1. Malignant neoplasm of upper-inner quadrant of left breast in female, estrogen receptor positive (HCC)        2. Encounter for long-term (current) use of high-risk medication                Impression:  1.  Invasive ductal carcinoma of the left breast, grade 3, clinically stage IIa (cT2, cN0) ER +95%, OK negative, HER2 IHC 2+ FISH negative, Ki-67 30%.  Status post left partial mastectomy and sentinel lymph node biopsy for invasive ductal carcinoma, grade 3, stage IIb (pT2, PN 1 LEXI).  Oncotype DX recurrence score 39.  She is a good candidate for adjuvant chemotherapy.  Due to her postpolio syndrome would avoid taxanes and I am planning AC x4 if her echocardiogram is normal.  2.  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  3.  Constipation -related to Zofran premedication or chemotherapy.  Discussed with patient getting on a more regular schedule with daily MiraLAX especially the first week after chemotherapy.  Discussed the possibility of changing premed Zofran to Aloxi but she is not certain if she wants to do that as she did have good nausea control with her cycle 1 treatment.    Plan:  Patient is done very well with treatment.  She will return to the clinic in 2 weeks prior to cycle 2.  She did have some constipation as discussed above.  We discussed changing to Aloxi but she is concerned about changing as she did have have good control of her nausea.  Also with continued mass in the left breast at the surgical incision site which may likely be seroma.  She states its not tender or red.  I was able to measure it measuring approximately 4 cm along the line of the incision by approximately 1 cm.  We will reevaluate at next visit as well.  If worsens, becomes painful  or red, recommend patient contact her surgeon.      Please note that this dictation was created using voice recognition software. I have made every reasonable attempt to correct obvious errors, but I expect that there are errors of grammar and possibly content that I did not discover before finalizing the note.

## 2023-05-11 NOTE — PROGRESS NOTES
On May 9, 2023, Oncology Social Worker Inessa Morgan and Oncology Nurse Navigator Anjali Cooper contacted pt. via telephone.  GONZALO Morgan introduced herself to pt. and shared she would be removing herself from pt.'s team due to personal connection.  Pt. shared she had her first chemotherapy and was nervous which caused her high distress screening score.  Pt. shared she is dealing with this privately and asked for GONZALO Morgan to keep this private.  GONZALO Morgan informed pt. due to HIPAA she would not be disclosing any of this information to anyone.      Pt. shared her biggest concern to be financial in terms of cost of her cancer treatment.  GONZALO Morgan spoke to pt. about sharing this with NIA Garrido in order for referral for [employer] to be placed along with speaking to pt. about [employer].  Pt. agreed to complete [employer] application.  GONZALO Morgan informed pt. she would leave application at the  [facility] for pt. to  at the .      GONZALO Morgan reiterated she will be stepping off pt.'s case.

## 2023-05-16 RX ORDER — 0.9 % SODIUM CHLORIDE 0.9 %
3 VIAL (ML) INJECTION PRN
Status: CANCELLED | OUTPATIENT
Start: 2023-05-22

## 2023-05-16 RX ORDER — 0.9 % SODIUM CHLORIDE 0.9 %
10 VIAL (ML) INJECTION PRN
Status: CANCELLED | OUTPATIENT
Start: 2023-05-22

## 2023-05-16 RX ORDER — SODIUM CHLORIDE 9 MG/ML
INJECTION, SOLUTION INTRAVENOUS CONTINUOUS
Status: CANCELLED | OUTPATIENT
Start: 2023-05-24

## 2023-05-16 RX ORDER — PROCHLORPERAZINE MALEATE 10 MG
10 TABLET ORAL EVERY 6 HOURS PRN
Status: CANCELLED | OUTPATIENT
Start: 2023-05-24

## 2023-05-16 RX ORDER — 0.9 % SODIUM CHLORIDE 0.9 %
10 VIAL (ML) INJECTION PRN
Status: CANCELLED | OUTPATIENT
Start: 2023-05-24

## 2023-05-16 RX ORDER — 0.9 % SODIUM CHLORIDE 0.9 %
3 VIAL (ML) INJECTION PRN
Status: CANCELLED | OUTPATIENT
Start: 2023-05-24

## 2023-05-16 RX ORDER — ONDANSETRON 8 MG/1
8 TABLET, ORALLY DISINTEGRATING ORAL PRN
Status: CANCELLED | OUTPATIENT
Start: 2023-05-24

## 2023-05-16 RX ORDER — 0.9 % SODIUM CHLORIDE 0.9 %
VIAL (ML) INJECTION PRN
Status: CANCELLED | OUTPATIENT
Start: 2023-05-22

## 2023-05-16 RX ORDER — 0.9 % SODIUM CHLORIDE 0.9 %
VIAL (ML) INJECTION PRN
Status: CANCELLED | OUTPATIENT
Start: 2023-05-24

## 2023-05-16 RX ORDER — ONDANSETRON 2 MG/ML
4 INJECTION INTRAMUSCULAR; INTRAVENOUS PRN
Status: CANCELLED | OUTPATIENT
Start: 2023-05-24

## 2023-05-20 NOTE — PROGRESS NOTES
"Pharmacy Chemotherapy Verification    Dx: Breast CA  Cycle: 2 (Previous treatment = C1 5/3/23; Surgery)  Regimen and Dosing Reference  DOXOrubicin 60 mg/m2 IV push on Day 1  Cyclophosphamide 600 mg/m2 IV over 30 minutes on Day 1  21 day cycle for 4 cycles (neoadjuvant or adjuvant)  NCCN Guidelines for Breast Cancer.V.2.2022  Светлана VANCE et al. J Clin Oncol. 2003;21(6):968-75  Kade B, et al. J Clin Oncol. 1990;8(9):1483-96    Allergies:Penicillin g and Tetracycline  BP (!) 173/94   Pulse 92   Temp 37 °C (98.6 °F) (Temporal)   Resp 18   Ht 1.63 m (5' 4.17\")   Wt 99.8 kg (220 lb 0.3 oz) Comment: patient refused to be weighed  SpO2 96%   BMI 37.56 kg/m²   Body surface area is 2.13 meters squared.    Labs 5/24/23  ANC 3120 Hgb 12.8 Plt 396k    Labs 5/22/23  SCr 0.59 CrCl 117 mL/min   AST/ALT/AP = 21/21/68 Tbili = 0.2    ECHO 4/25/23  LVEF 75%    DOXOrubicin 60 mg/m2 x 2.13 m2 = 127.8 mg   <10% difference, OK to treat with final dose = 130 mg IV    Cyclophosphamide 600 mg/m2 x 2.13 m2 = 1278 mg   <10% difference, OK to treat with final dose = 1300 mg IV    Stephanie Zhou, PharmD, BCOP    "

## 2023-05-22 ENCOUNTER — OUTPATIENT INFUSION SERVICES (OUTPATIENT)
Dept: ONCOLOGY | Facility: MEDICAL CENTER | Age: 73
End: 2023-05-22
Attending: INTERNAL MEDICINE
Payer: MEDICARE

## 2023-05-22 ENCOUNTER — PATIENT MESSAGE (OUTPATIENT)
Dept: ONCOLOGY | Facility: MEDICAL CENTER | Age: 73
End: 2023-05-22

## 2023-05-22 ENCOUNTER — PATIENT OUTREACH (OUTPATIENT)
Dept: ONCOLOGY | Facility: MEDICAL CENTER | Age: 73
End: 2023-05-22

## 2023-05-22 VITALS
RESPIRATION RATE: 18 BRPM | OXYGEN SATURATION: 96 % | HEART RATE: 82 BPM | DIASTOLIC BLOOD PRESSURE: 79 MMHG | TEMPERATURE: 98.7 F | SYSTOLIC BLOOD PRESSURE: 142 MMHG

## 2023-05-22 DIAGNOSIS — C50.212 MALIGNANT NEOPLASM OF UPPER-INNER QUADRANT OF LEFT BREAST IN FEMALE, ESTROGEN RECEPTOR POSITIVE (HCC): ICD-10-CM

## 2023-05-22 DIAGNOSIS — Z17.0 MALIGNANT NEOPLASM OF UPPER-INNER QUADRANT OF LEFT BREAST IN FEMALE, ESTROGEN RECEPTOR POSITIVE (HCC): ICD-10-CM

## 2023-05-22 LAB
ALBUMIN SERPL BCP-MCNC: 3.9 G/DL (ref 3.2–4.9)
ALBUMIN/GLOB SERPL: 1.7 G/DL
ALP SERPL-CCNC: 68 U/L (ref 30–99)
ALT SERPL-CCNC: 21 U/L (ref 2–50)
ANION GAP SERPL CALC-SCNC: 13 MMOL/L (ref 7–16)
AST SERPL-CCNC: 21 U/L (ref 12–45)
BASOPHILS # BLD AUTO: 0.7 % (ref 0–1.8)
BASOPHILS # BLD: 0.03 K/UL (ref 0–0.12)
BILIRUB SERPL-MCNC: 0.2 MG/DL (ref 0.1–1.5)
BUN SERPL-MCNC: 15 MG/DL (ref 8–22)
CALCIUM ALBUM COR SERPL-MCNC: 9.4 MG/DL (ref 8.5–10.5)
CALCIUM SERPL-MCNC: 9.3 MG/DL (ref 8.5–10.5)
CHLORIDE SERPL-SCNC: 107 MMOL/L (ref 96–112)
CO2 SERPL-SCNC: 22 MMOL/L (ref 20–33)
CREAT SERPL-MCNC: 0.59 MG/DL (ref 0.5–1.4)
EOSINOPHIL # BLD AUTO: 0.01 K/UL (ref 0–0.51)
EOSINOPHIL NFR BLD: 0.2 % (ref 0–6.9)
ERYTHROCYTE [DISTWIDTH] IN BLOOD BY AUTOMATED COUNT: 42.8 FL (ref 35.9–50)
GFR SERPLBLD CREATININE-BSD FMLA CKD-EPI: 97 ML/MIN/1.73 M 2
GLOBULIN SER CALC-MCNC: 2.3 G/DL (ref 1.9–3.5)
GLUCOSE SERPL-MCNC: 107 MG/DL (ref 65–99)
HCT VFR BLD AUTO: 39.5 % (ref 37–47)
HGB BLD-MCNC: 13 G/DL (ref 12–16)
IMM GRANULOCYTES # BLD AUTO: 0.1 K/UL (ref 0–0.11)
IMM GRANULOCYTES NFR BLD AUTO: 2.4 % (ref 0–0.9)
LYMPHOCYTES # BLD AUTO: 1.57 K/UL (ref 1–4.8)
LYMPHOCYTES NFR BLD: 37.8 % (ref 22–41)
MCH RBC QN AUTO: 29 PG (ref 27–33)
MCHC RBC AUTO-ENTMCNC: 32.9 G/DL (ref 32.2–35.5)
MCV RBC AUTO: 88.2 FL (ref 81.4–97.8)
MONOCYTES # BLD AUTO: 0.98 K/UL (ref 0–0.85)
MONOCYTES NFR BLD AUTO: 23.6 % (ref 0–13.4)
NEUTROPHILS # BLD AUTO: 1.46 K/UL (ref 1.82–7.42)
NEUTROPHILS NFR BLD: 35.3 % (ref 44–72)
NRBC # BLD AUTO: 0 K/UL
NRBC BLD-RTO: 0 /100 WBC (ref 0–0.2)
OUTPT INFUS CBC COMMENT OICOM: ABNORMAL
PLATELET # BLD AUTO: 414 K/UL (ref 164–446)
PMV BLD AUTO: 9.5 FL (ref 9–12.9)
POTASSIUM SERPL-SCNC: 4 MMOL/L (ref 3.6–5.5)
PROT SERPL-MCNC: 6.2 G/DL (ref 6–8.2)
RBC # BLD AUTO: 4.48 M/UL (ref 4.2–5.4)
SODIUM SERPL-SCNC: 142 MMOL/L (ref 135–145)
WBC # BLD AUTO: 4.2 K/UL (ref 4.8–10.8)

## 2023-05-22 PROCEDURE — 700111 HCHG RX REV CODE 636 W/ 250 OVERRIDE (IP): Performed by: NURSE PRACTITIONER

## 2023-05-22 PROCEDURE — 80053 COMPREHEN METABOLIC PANEL: CPT

## 2023-05-22 PROCEDURE — 85025 COMPLETE CBC W/AUTO DIFF WBC: CPT

## 2023-05-22 PROCEDURE — A4212 NON CORING NEEDLE OR STYLET: HCPCS

## 2023-05-22 PROCEDURE — 36591 DRAW BLOOD OFF VENOUS DEVICE: CPT

## 2023-05-22 RX ADMIN — HEPARIN 500 UNITS: 100 SYRINGE at 09:43

## 2023-05-22 NOTE — PROGRESS NOTES
Donna is here for pre-chemo labs. Right port accessed using sterile technique; brisk blood return noted. Labs drawn as ordered. Heparin instilled prior to de-accessing port. Port de-accessed; gauze/tape applied to site. Next appointment scheduled. Discharged to self care; no apparent distress noted.

## 2023-05-23 NOTE — PROGRESS NOTES
"Patient called nurse navigator requesting assistance with Genetics testing scheduling.  Referral was placed by Dr. Donaldson on 3/27/23.  Navigator checked patient's chart for referral and notes.  [facility] attempted to call patient 5 times and was unsuccessful at reaching her.  Patient shared she thought she could \"put it off\", but spoke with Dr. Donaldson during a visit today and the doctor encouraged her to complete as soon as possible as results are very important in plan of care moving forward.  Navigator sent Teams message to Emilia Frausto with patient situation & confirmed best phone number requesting she call patient to schedule.  Emilia replied she will contact the patient to schedule.  "

## 2023-05-24 ENCOUNTER — HOSPITAL ENCOUNTER (OUTPATIENT)
Dept: HEMATOLOGY ONCOLOGY | Facility: MEDICAL CENTER | Age: 73
End: 2023-05-24
Attending: INTERNAL MEDICINE | Admitting: INTERNAL MEDICINE
Payer: MEDICARE

## 2023-05-24 ENCOUNTER — OUTPATIENT INFUSION SERVICES (OUTPATIENT)
Dept: ONCOLOGY | Facility: MEDICAL CENTER | Age: 73
End: 2023-05-24
Attending: INTERNAL MEDICINE
Payer: MEDICARE

## 2023-05-24 VITALS
DIASTOLIC BLOOD PRESSURE: 74 MMHG | TEMPERATURE: 98.6 F | WEIGHT: 220.02 LBS | HEART RATE: 83 BPM | BODY MASS INDEX: 37.56 KG/M2 | RESPIRATION RATE: 18 BRPM | HEIGHT: 64 IN | OXYGEN SATURATION: 96 % | SYSTOLIC BLOOD PRESSURE: 142 MMHG

## 2023-05-24 VITALS
RESPIRATION RATE: 16 BRPM | OXYGEN SATURATION: 96 % | TEMPERATURE: 99 F | SYSTOLIC BLOOD PRESSURE: 128 MMHG | DIASTOLIC BLOOD PRESSURE: 76 MMHG | HEIGHT: 64 IN | BODY MASS INDEX: 39.82 KG/M2 | HEART RATE: 87 BPM

## 2023-05-24 DIAGNOSIS — C50.212 MALIGNANT NEOPLASM OF UPPER-INNER QUADRANT OF LEFT BREAST IN FEMALE, ESTROGEN RECEPTOR POSITIVE (HCC): ICD-10-CM

## 2023-05-24 DIAGNOSIS — Z79.899 ENCOUNTER FOR LONG-TERM (CURRENT) USE OF HIGH-RISK MEDICATION: ICD-10-CM

## 2023-05-24 DIAGNOSIS — Z17.0 MALIGNANT NEOPLASM OF UPPER-INNER QUADRANT OF LEFT BREAST IN FEMALE, ESTROGEN RECEPTOR POSITIVE (HCC): ICD-10-CM

## 2023-05-24 LAB
BASOPHILS # BLD AUTO: 0.8 % (ref 0–1.8)
BASOPHILS # BLD: 0.05 K/UL (ref 0–0.12)
EOSINOPHIL # BLD AUTO: 0.02 K/UL (ref 0–0.51)
EOSINOPHIL NFR BLD: 0.3 % (ref 0–6.9)
ERYTHROCYTE [DISTWIDTH] IN BLOOD BY AUTOMATED COUNT: 42.2 FL (ref 35.9–50)
HCT VFR BLD AUTO: 39.4 % (ref 37–47)
HGB BLD-MCNC: 12.8 G/DL (ref 12–16)
IMM GRANULOCYTES # BLD AUTO: 0.22 K/UL (ref 0–0.11)
IMM GRANULOCYTES NFR BLD AUTO: 3.6 % (ref 0–0.9)
LYMPHOCYTES # BLD AUTO: 1.71 K/UL (ref 1–4.8)
LYMPHOCYTES NFR BLD: 28.2 % (ref 22–41)
MCH RBC QN AUTO: 28.3 PG (ref 27–33)
MCHC RBC AUTO-ENTMCNC: 32.5 G/DL (ref 32.2–35.5)
MCV RBC AUTO: 87.2 FL (ref 81.4–97.8)
MONOCYTES # BLD AUTO: 0.94 K/UL (ref 0–0.85)
MONOCYTES NFR BLD AUTO: 15.5 % (ref 0–13.4)
NEUTROPHILS # BLD AUTO: 3.12 K/UL (ref 1.82–7.42)
NEUTROPHILS NFR BLD: 51.6 % (ref 44–72)
NRBC # BLD AUTO: 0 K/UL
NRBC BLD-RTO: 0 /100 WBC (ref 0–0.2)
OUTPT INFUS CBC COMMENT OICOM: ABNORMAL
PLATELET # BLD AUTO: 396 K/UL (ref 164–446)
PMV BLD AUTO: 9.6 FL (ref 9–12.9)
RBC # BLD AUTO: 4.52 M/UL (ref 4.2–5.4)
WBC # BLD AUTO: 6.1 K/UL (ref 4.8–10.8)

## 2023-05-24 PROCEDURE — 700105 HCHG RX REV CODE 258: Performed by: NURSE PRACTITIONER

## 2023-05-24 PROCEDURE — 3074F SYST BP LT 130 MM HG: CPT | Performed by: NURSE PRACTITIONER

## 2023-05-24 PROCEDURE — 700111 HCHG RX REV CODE 636 W/ 250 OVERRIDE (IP): Performed by: NURSE PRACTITIONER

## 2023-05-24 PROCEDURE — 3078F DIAST BP <80 MM HG: CPT | Performed by: NURSE PRACTITIONER

## 2023-05-24 PROCEDURE — 85025 COMPLETE CBC W/AUTO DIFF WBC: CPT

## 2023-05-24 PROCEDURE — A4212 NON CORING NEEDLE OR STYLET: HCPCS

## 2023-05-24 PROCEDURE — 96375 TX/PRO/DX INJ NEW DRUG ADDON: CPT

## 2023-05-24 PROCEDURE — 96367 TX/PROPH/DG ADDL SEQ IV INF: CPT

## 2023-05-24 PROCEDURE — 96413 CHEMO IV INFUSION 1 HR: CPT

## 2023-05-24 PROCEDURE — 99214 OFFICE O/P EST MOD 30 MIN: CPT | Performed by: NURSE PRACTITIONER

## 2023-05-24 PROCEDURE — 99212 OFFICE O/P EST SF 10 MIN: CPT | Mod: 25 | Performed by: NURSE PRACTITIONER

## 2023-05-24 PROCEDURE — 96417 CHEMO IV INFUS EACH ADDL SEQ: CPT

## 2023-05-24 RX ORDER — POLYETHYLENE GLYCOL 3350 17 G/17G
17 POWDER, FOR SOLUTION ORAL DAILY
COMMUNITY
End: 2023-08-08

## 2023-05-24 RX ADMIN — DEXAMETHASONE SODIUM PHOSPHATE 12 MG: 4 INJECTION, SOLUTION INTRA-ARTICULAR; INTRALESIONAL; INTRAMUSCULAR; INTRAVENOUS; SOFT TISSUE at 14:45

## 2023-05-24 RX ADMIN — DOXORUBICIN HYDROCHLORIDE 130 MG: 2 INJECTION, SOLUTION INTRAVENOUS at 15:55

## 2023-05-24 RX ADMIN — HEPARIN 500 UNITS: 100 SYRINGE at 17:30

## 2023-05-24 RX ADMIN — ONDANSETRON 16 MG: 2 INJECTION INTRAMUSCULAR; INTRAVENOUS at 15:00

## 2023-05-24 RX ADMIN — FOSAPREPITANT 150 MG: 150 INJECTION, POWDER, LYOPHILIZED, FOR SOLUTION INTRAVENOUS at 15:20

## 2023-05-24 RX ADMIN — CYCLOPHOSPHAMIDE 1300 MG: 200 INJECTION, SOLUTION INTRAVENOUS at 16:25

## 2023-05-24 ASSESSMENT — ENCOUNTER SYMPTOMS
SHORTNESS OF BREATH: 0
COUGH: 0
PALPITATIONS: 0
CONSTIPATION: 0
FEVER: 0
CHILLS: 0
DIARRHEA: 0
NAUSEA: 0
VOMITING: 0

## 2023-05-24 ASSESSMENT — FIBROSIS 4 INDEX: FIB4 SCORE: 0.77

## 2023-05-24 NOTE — PROGRESS NOTES
Chemotherapy Verification - SECONDARY RN       Height = 163 cm  Weight = 99.8 kg  BSA = 2.13 m2       Medication: doxorubicin  Dose: 60 mg/m2  Calculated Dose: 127.8 mg                             (In mg/m2, AUC, mg/kg)     Medication: cyclophosphamide  Dose: 600 mg/m2  Calculated Dose: 1278 mg                             (In mg/m2, AUC, mg/kg)        I confirm that this process was performed independently.

## 2023-05-24 NOTE — PROGRESS NOTES
w into [facility]usions [facility] for Cycle 2 of Adriamycin / Cytoxan / Labs for Malignant neoplasm of upper-inner quadrant of left breast in female. Pt denied having any new or acute complaints today, reports tolerating past treatments well. Port accessed in a sterile manner, had + blood return, flushed briskly. 801 called for Arelis GARDUNO to reviewed results. Pt given anticancer therapy as prescribed, tolerated well, denied having any complaints during or after infusion. Port had + blood return after, flushed per [facility] policy, de-accessed, needle intact, insertion site covered with sterile gauze and paper tape. Discharge home to self care in [facility]. Appointment confirm for next treatment.

## 2023-05-24 NOTE — PROGRESS NOTES
"Pharmacy Chemotherapy Calculation    Dx: Breast cancer         Protocol: AC  Doxorubicin 60 mg/m2 IV over 20 minutes  Cyclophosphamide 600 mg/m2 IV over 30 minutes   21-day cycle for 4 cycles (adjuvant)  NCCN Guidelines for Breast Cancer V.2.2022.  Светлана VANCE, et al. J Clin Oncol. 2003;21(6):968-75.  Kade B et al. J Clin Oncol. 1990;8(9):1483-96.    Allergies:  Penicillin g and Tetracycline       BP (!) 173/94   Pulse 92   Temp 37 °C (98.6 °F) (Temporal)   Resp 18   Ht 1.63 m (5' 4.17\")   Wt 99.8 kg (220 lb 0.3 oz) Comment: patient refused to be weighed  SpO2 96%   BMI 37.56 kg/m²  Body surface area is 2.13 meters squared.    ECHO:  4/25/23: 75%    Labs 5/22/23:  SCr = 0.59 mg/dL CrCl ~ 118 mL/min   LFT's = WNL TBili = 0.2     Labs 5/24/23:  ANC~ 3120 Plt = 396k   Hgb = 12.8        Drug Order   (Drug name, dose, route, IV Fluid & volume, frequency, number of doses) Cycle 2  Previous treatment: C1 on 5/3/23     Medication = Doxorubicin  Base Dose= 60 mg/m2  Calc Dose: Base Dose x 2.13 m2 = 127.8 mg  Final Dose = 130 mg  Route = IV  Drug conc. = 2 mg/mL  Fluid & Volume = 65 mL in empty bag  Admin Duration = Over 20 minutes          <10% difference, OK to treat with final dose   Medication = Cyclophosphamide  Base Dos]e = 600 mg/m2  Calc Dose: Base Dose x 2.13 m2 = 1278 mg  Final Dose = 1300 mg  Route = IV  Fluid & Volume =  mL  Admin Duration = Over 30-60 minutes          <10% difference, OK to treat with final dose     By my signature below, I confirm this process was performed independently with the BSA and all final chemotherapy dosing calculations congruent. I have reviewed the above chemotherapy order and that my calculation of the final dose and BSA (when applicable) corroborate those calculations of the  pharmacist. Discrepancies of 10% or greater in the written dose have been addressed and documented within the [facility] Progress notes.    Abdirizak Iglesias, PharmD  "

## 2023-05-24 NOTE — PROGRESS NOTES
Subjective     Donna Palmer is a 72 y.o. female who presents with Breast Cancer (Prechemo )          HPI    Referring Physician: Lubna Donaldson MD  Primary Care:   [facility] Galleg[facility].     Diagnosis: Ductal carcinoma of the left breast, grade 3, clinically stage IIa (cT2, cN0) ER +95%, LA negative, HER2 IHC 2+ FISH negative, Ki-67 30%     Chief Complaint: Patient seen today for evaluation of her breast cancer, for continued monitoring of symptoms and side effects of cancer treatments, employing AC.     Oncology history of presenting illness:  Patient self detected a mass in her left breast in February 2023.  She had not had a mammogram for several years.  A diagnostic mammogram and ultrasound were completed on 3/6/2023 which showed a 2.7 x 2.4 x 2.0 cm spiculated irregular mass at 3 o'clock position of the left breast with no abnormal lymph nodes being detected.  She underwent an immediate ultrasound-guided biopsy demonstrated invasive ductal carcinoma, grade 3, at least 1.7 cm in the core, ER +95%, LA negative, Ki-67 30%, HER2 2+ IHC FISH negative.  She saw Dr. Donaldson in consultation and is planning to undergo partial mastectomy and sentinel lymph node biopsy.     Her history is notable for post radial syndrome with motor nerve impairment which is gotten gradually worse over the last couple of years.  She now has to use a scooter almost all the time and has started having impairment in activities of daily living.  Otherwise she has no chronic medical problems.  She has a maternal aunt who had breast cancer.  No other breast or ovarian cancer is noted.     Interval histories:  Interval history 4/20/2023: She underwent left partial mastectomy and sentinel lymph node biopsy on 4/4/2023.  Pathology demonstrated invasive ductal carcinoma 3.5 cm in greatest dimension with focal high-grade DCIS.  Margins were clear and there was no lymph vascular invasion.  1 sentinel lymph node was positive for  micrometastases and 1 sentinel lymph node was negative.  Postoperative course was unremarkable but she does feel a firm mass at the incision site which is consistent with a seroma in the breast.  We sent an Oncotype DX which came back with recurrence score of 39.  She has no history of heart problems but does have history of hypertension.     Interval history 05/10/23 (Ver[place]asop, APRN):  Patient seen today for her toxicity check following her first dose of chemotherapy employing AC.  Patient appeared to tolerate treatment very well.  She has not required any antinausea medications.  She had very mild underlying nausea but again no need for medication.  She does complain of constipation which is new with treatment.  She did have a bowel movement last night.  She was quite constipated after treatment and ended up taking milk of magnesia dose on Sunday night which caused diarrhea.  She also noticed a very mild elevation in temperature but that resolved.  She states her appetite has been good and she is eating well.  She has noticed decrease in energy this past week as well.    Interval history 05/24/23 (CAlsop, APRN):  Patient is apprehensive and scared about cycle 2 but overall she has been doing well.  She stated that her constipation is resolved and she is having normal bowel movements.  She did state that her hair started to fall out so she recently shaved it which she has been dealing with mentally.  Otherwise no other significant clinical complaints.  Derm at the incision site is still present but appears to be decreasing in size.  According to the patient she did reach out to Dr. Donaldson and was informed that there was no concerns at this time.    Treatment history:  04/04/23: Left partial mastectomy and sentinel lymph node biopsy - Dr. Donaldson  05/03/23: C1D1 Adriamycin and Cyclophosphamide  05/24/23: C2D1 Adriamycin and Cyclophosphamide      Allergies   Allergen Reactions    Penicillin G Hives     As a child     "Tetracycline Vomiting     Current Outpatient Medications on File Prior to Encounter   Medication Sig Dispense Refill    lidocaine-prilocaine (EMLA) 2.5-2.5 % Cream Apply to port site 1 hour prior to port access as needed daily 30 g 3    ondansetron (ZOFRAN) 4 MG Tab tablet Take 1 Tablet by mouth every four hours as needed for Nausea/Vomiting (for nausea, vomiting). 30 Tablet 6    prochlorperazine (COMPAZINE) 10 MG Tab Take 1 Tablet by mouth every 6 hours as needed (for nausea, vomiting). 30 Tablet 6    Multiple Vitamins-Minerals (MULTIVITAMIN ADULT PO) Take  by mouth.       No current facility-administered medications on file prior to encounter.         Review of Systems   Constitutional:  Positive for malaise/fatigue. Negative for chills and fever.   Respiratory:  Negative for cough and shortness of breath.    Cardiovascular:  Negative for chest pain and palpitations.   Gastrointestinal:  Negative for constipation, diarrhea, nausea and vomiting.   Genitourinary:  Negative for dysuria.              Objective     /76 (BP Location: Right arm, Patient Position: Sitting, BP Cuff Size: Adult)   Pulse 87   Temp 37.2 °C (99 °F) (Temporal)   Resp 16   Ht 1.63 m (5' 4.17\")   SpO2 96%   BMI 39.82 kg/m²      Physical Exam  Vitals reviewed.   Constitutional:       General: She is not in acute distress.     Appearance: Normal appearance. She is not diaphoretic.   HENT:      Head: Normocephalic and atraumatic.   Cardiovascular:      Rate and Rhythm: Normal rate and regular rhythm.      Heart sounds: Normal heart sounds. No murmur heard.     No friction rub. No gallop.   Pulmonary:      Effort: Pulmonary effort is normal. No respiratory distress.      Breath sounds: Normal breath sounds. No wheezing.   Chest:      Comments: 05/24/23: Mass noted at the incision site, likely seroma that appears to be decreasing in size, now measuring 3 x 1 cm. Still with no tenderness or erythema noted.   05/10/23: Mass noted at the " incision site, likely a seroma. Measures 4 x 1 cm in size. No redness or tenderness.   03/31/23: Left breast shows a 3 x 3 cm mass at 230 o'clock 7 cm from the nipple which is firm to hard.  No left axillary or supraclavicular adenopathy is noted.  The right breast is normal.  Abdominal:      General: Bowel sounds are normal. There is no distension.      Palpations: Abdomen is soft.      Tenderness: There is no abdominal tenderness.   Musculoskeletal:      Comments: In wheelchair   Skin:     General: Skin is warm and dry.   Neurological:      Mental Status: She is alert and oriented to person, place, and time.   Psychiatric:         Mood and Affect: Mood normal.         Behavior: Behavior normal.                Latest Reference Range & Units 05/22/23 09:42   WBC 4.8 - 10.8 K/uL 4.2 (L)   RBC 4.20 - 5.40 M/uL 4.48   Hemoglobin 12.0 - 16.0 g/dL 13.0   Hematocrit 37.0 - 47.0 % 39.5   MCV 81.4 - 97.8 fL 88.2   MCH 27.0 - 33.0 pg 29.0   MCHC 32.2 - 35.5 g/dL 32.9   RDW 35.9 - 50.0 fL 42.8   Platelet Count 164 - 446 K/uL 414   MPV 9.0 - 12.9 fL 9.5   Neutrophils-Polys 44.00 - 72.00 % 35.30 (L)   Neutrophils (Absolute) 1.82 - 7.42 K/uL 1.46 (L)   Lymphocytes 22.00 - 41.00 % 37.80   Lymphs (Absolute) 1.00 - 4.80 K/uL 1.57   Monocytes 0.00 - 13.40 % 23.60 (H)   Monos (Absolute) 0.00 - 0.85 K/uL 0.98 (H)   Eosinophils 0.00 - 6.90 % 0.20   Eos (Absolute) 0.00 - 0.51 K/uL 0.01   Basophils 0.00 - 1.80 % 0.70   Baso (Absolute) 0.00 - 0.12 K/uL 0.03   Immature Granulocytes 0.00 - 0.90 % 2.40 (H)   Immature Granulocytes (abs) 0.00 - 0.11 K/uL 0.10   Nucleated RBC 0.00 - 0.20 /100 WBC 0.00   NRBC (Absolute) K/uL 0.00   Outpt Infus CBC Comment  see below   Sodium 135 - 145 mmol/L 142   Potassium 3.6 - 5.5 mmol/L 4.0   Chloride 96 - 112 mmol/L 107   Co2 20 - 33 mmol/L 22   Anion Gap 7.0 - 16.0  13.0   Glucose 65 - 99 mg/dL 107 (H)   Bun 8 - 22 mg/dL 15   Creatinine 0.50 - 1.40 mg/dL 0.59   GFR (CKD-EPI) >60 mL/min/1.73 m 2 97    Calcium 8.5 - 10.5 mg/dL 9.3   Correct Calcium 8.5 - 10.5 mg/dL 9.4   AST(SGOT) 12 - 45 U/L 21   ALT(SGPT) 2 - 50 U/L 21   Alkaline Phosphatase 30 - 99 U/L 68   Total Bilirubin 0.1 - 1.5 mg/dL 0.2   Albumin 3.2 - 4.9 g/dL 3.9   Total Protein 6.0 - 8.2 g/dL 6.2   Globulin 1.9 - 3.5 g/dL 2.3   A-G Ratio g/dL 1.7                Assessment & Plan       1. Malignant neoplasm of upper-inner quadrant of left breast in female, estrogen receptor positive (HCC)        2. Encounter for long-term (current) use of high-risk medication                Impression:  1.  Invasive ductal carcinoma of the left breast, grade 3, clinically stage IIa (cT2, cN0) ER +95%, KS negative, HER2 IHC 2+ FISH negative, Ki-67 30%.  Status post left partial mastectomy and sentinel lymph node biopsy for invasive ductal carcinoma, grade 3, stage IIb (pT2, PN 1 LEXI).  Oncotype DX recurrence score 39.  She is a good candidate for adjuvant chemotherapy.  Due to her postpolio syndrome would avoid taxanes and I am planning AC x4 if her echocardiogram is normal.  2.  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  3.  Constipation -related to Zofran premedication or chemotherapy.  Discussed with patient getting on a more regular schedule with daily MiraLAX especially the first week after chemotherapy.  Discussed the possibility of changing premed Zofran to Aloxi but she is not certain if she wants to do that as she did have good nausea control with her cycle 1 treatment.     Plan:  Patient is feeling well.  She is little anxious for cycle 2 but overall ready to go.  Her CBC did show slightly low ANC and I will have them repeated today as these labs were completed approximately 48 hours ago.  Discussed with patient the plan as well as infusion RN.  Overall though patient is okay to proceed with treatment today as planned.  She will follow-up in the clinic in 3 weeks, or sooner if needed.      Please note that  this dictation was created using voice recognition software. I have made every reasonable attempt to correct obvious errors, but I expect that there are errors of grammar and possibly content that I did not discover before finalizing the note.

## 2023-05-24 NOTE — PROGRESS NOTES
Chemotherapy Verification - PRIMARY RN      Height = 64.17 in  Weight = 220 lb  BSA = 2.13 m2       Medication: Adriamycin  Dose: 60 mg/m2  Calculated Dose: 127.8 mg / LVEF = 75% on 4/25/2023                            (In mg/m2, AUC, mg/kg)     Medication: Cytoxan  Dose: 600 mg/m2  Calculated Dose: 678 mg                             (In mg/m2, AUC, mg/kg)    I confirm this process was performed independently with the BSA and all final chemotherapy dosing calculations congruent.  Any discrepancies of 10% or greater have been addressed with the chemotherapy pharmacist. The resolution of the discrepancy has been documented in the EPIC progress notes.

## 2023-06-02 ENCOUNTER — HOSPITAL ENCOUNTER (OUTPATIENT)
Dept: LAB | Facility: MEDICAL CENTER | Age: 73
End: 2023-06-02
Attending: NURSE PRACTITIONER
Payer: MEDICARE

## 2023-06-02 DIAGNOSIS — Z79.899 ENCOUNTER FOR LONG-TERM (CURRENT) USE OF HIGH-RISK MEDICATION: ICD-10-CM

## 2023-06-02 LAB
APPEARANCE UR: CLEAR
BACTERIA #/AREA URNS HPF: NEGATIVE /HPF
BILIRUB UR QL STRIP.AUTO: NEGATIVE
COLOR UR: YELLOW
EPI CELLS #/AREA URNS HPF: ABNORMAL /HPF
GLUCOSE UR STRIP.AUTO-MCNC: NEGATIVE MG/DL
HYALINE CASTS #/AREA URNS LPF: ABNORMAL /LPF
KETONES UR STRIP.AUTO-MCNC: NEGATIVE MG/DL
LEUKOCYTE ESTERASE UR QL STRIP.AUTO: ABNORMAL
MICRO URNS: ABNORMAL
NITRITE UR QL STRIP.AUTO: NEGATIVE
PH UR STRIP.AUTO: 5.5 [PH] (ref 5–8)
PROT UR QL STRIP: NEGATIVE MG/DL
RBC # URNS HPF: ABNORMAL /HPF
RBC UR QL AUTO: NEGATIVE
SP GR UR STRIP.AUTO: 1.02
UROBILINOGEN UR STRIP.AUTO-MCNC: 0.2 MG/DL
WBC #/AREA URNS HPF: ABNORMAL /HPF

## 2023-06-02 PROCEDURE — 81001 URINALYSIS AUTO W/SCOPE: CPT

## 2023-06-05 ENCOUNTER — TELEPHONE (OUTPATIENT)
Dept: HEMATOLOGY ONCOLOGY | Facility: MEDICAL CENTER | Age: 73
End: 2023-06-05
Payer: MEDICARE

## 2023-06-05 RX ORDER — 0.9 % SODIUM CHLORIDE 0.9 %
10 VIAL (ML) INJECTION PRN
Status: CANCELLED | OUTPATIENT
Start: 2023-06-14

## 2023-06-05 RX ORDER — 0.9 % SODIUM CHLORIDE 0.9 %
VIAL (ML) INJECTION PRN
Status: CANCELLED | OUTPATIENT
Start: 2023-06-13

## 2023-06-05 RX ORDER — SODIUM CHLORIDE 9 MG/ML
INJECTION, SOLUTION INTRAVENOUS CONTINUOUS
Status: CANCELLED | OUTPATIENT
Start: 2023-06-14

## 2023-06-05 RX ORDER — 0.9 % SODIUM CHLORIDE 0.9 %
10 VIAL (ML) INJECTION PRN
Status: CANCELLED | OUTPATIENT
Start: 2023-06-13

## 2023-06-05 RX ORDER — ONDANSETRON 2 MG/ML
4 INJECTION INTRAMUSCULAR; INTRAVENOUS PRN
Status: CANCELLED | OUTPATIENT
Start: 2023-06-14

## 2023-06-05 RX ORDER — 0.9 % SODIUM CHLORIDE 0.9 %
3 VIAL (ML) INJECTION PRN
Status: CANCELLED | OUTPATIENT
Start: 2023-06-14

## 2023-06-05 RX ORDER — 0.9 % SODIUM CHLORIDE 0.9 %
3 VIAL (ML) INJECTION PRN
Status: CANCELLED | OUTPATIENT
Start: 2023-06-13

## 2023-06-05 RX ORDER — 0.9 % SODIUM CHLORIDE 0.9 %
VIAL (ML) INJECTION PRN
Status: CANCELLED | OUTPATIENT
Start: 2023-06-14

## 2023-06-05 RX ORDER — ONDANSETRON 8 MG/1
8 TABLET, ORALLY DISINTEGRATING ORAL PRN
Status: CANCELLED | OUTPATIENT
Start: 2023-06-14

## 2023-06-05 RX ORDER — PROCHLORPERAZINE MALEATE 10 MG
10 TABLET ORAL EVERY 6 HOURS PRN
Status: CANCELLED | OUTPATIENT
Start: 2023-06-14

## 2023-06-05 NOTE — TELEPHONE ENCOUNTER
Contacted patient via phone to notify of UA results that were ordered on 6/2/23 related to a [email or web address] message from patient. Pt c/o discomfort while urinating and intermittent frequency issues per mychart message. Pt still endorses slight burning and itchiness but have not worsened. Pt advised to drink plenty of water and cranberry juice and will f/u with patient via phone tomorrow 6/6/23.

## 2023-06-06 ENCOUNTER — PATIENT MESSAGE (OUTPATIENT)
Dept: HEMATOLOGY ONCOLOGY | Facility: MEDICAL CENTER | Age: 73
End: 2023-06-06
Payer: MEDICARE

## 2023-06-06 DIAGNOSIS — Z79.899 ENCOUNTER FOR LONG-TERM (CURRENT) USE OF HIGH-RISK MEDICATION: ICD-10-CM

## 2023-06-06 RX ORDER — NITROFURANTOIN 25; 75 MG/1; MG/1
100 CAPSULE ORAL 2 TIMES DAILY
Qty: 10 CAPSULE | Refills: 0 | Status: SHIPPED | OUTPATIENT
Start: 2023-06-06 | End: 2023-06-11

## 2023-06-06 NOTE — PATIENT COMMUNICATION
Contacted patient to f/u on urinary symptoms. Pt stated that symptoms have remained the same with no improvement. Discussed with Ciarra, order for macrobid sent to patient pharmacy. Updated patient via [employer] message per patient preference.

## 2023-06-13 ENCOUNTER — OUTPATIENT INFUSION SERVICES (OUTPATIENT)
Dept: ONCOLOGY | Facility: MEDICAL CENTER | Age: 73
End: 2023-06-13
Attending: INTERNAL MEDICINE
Payer: MEDICARE

## 2023-06-13 VITALS
HEART RATE: 84 BPM | SYSTOLIC BLOOD PRESSURE: 142 MMHG | RESPIRATION RATE: 18 BRPM | OXYGEN SATURATION: 92 % | TEMPERATURE: 98 F | DIASTOLIC BLOOD PRESSURE: 70 MMHG

## 2023-06-13 DIAGNOSIS — Z17.0 MALIGNANT NEOPLASM OF UPPER-INNER QUADRANT OF LEFT BREAST IN FEMALE, ESTROGEN RECEPTOR POSITIVE (HCC): ICD-10-CM

## 2023-06-13 DIAGNOSIS — C50.212 MALIGNANT NEOPLASM OF UPPER-INNER QUADRANT OF LEFT BREAST IN FEMALE, ESTROGEN RECEPTOR POSITIVE (HCC): ICD-10-CM

## 2023-06-13 LAB
ALBUMIN SERPL BCP-MCNC: 3.9 G/DL (ref 3.2–4.9)
ALBUMIN/GLOB SERPL: 1.6 G/DL
ALP SERPL-CCNC: 76 U/L (ref 30–99)
ALT SERPL-CCNC: 22 U/L (ref 2–50)
ANION GAP SERPL CALC-SCNC: 13 MMOL/L (ref 7–16)
AST SERPL-CCNC: 27 U/L (ref 12–45)
BASOPHILS # BLD AUTO: 1 % (ref 0–1.8)
BASOPHILS # BLD: 0.05 K/UL (ref 0–0.12)
BILIRUB SERPL-MCNC: 0.2 MG/DL (ref 0.1–1.5)
BUN SERPL-MCNC: 10 MG/DL (ref 8–22)
CALCIUM ALBUM COR SERPL-MCNC: 9.3 MG/DL (ref 8.5–10.5)
CALCIUM SERPL-MCNC: 9.2 MG/DL (ref 8.5–10.5)
CHLORIDE SERPL-SCNC: 106 MMOL/L (ref 96–112)
CO2 SERPL-SCNC: 24 MMOL/L (ref 20–33)
CREAT SERPL-MCNC: 0.56 MG/DL (ref 0.5–1.4)
EOSINOPHIL # BLD AUTO: 0.03 K/UL (ref 0–0.51)
EOSINOPHIL NFR BLD: 0.6 % (ref 0–6.9)
ERYTHROCYTE [DISTWIDTH] IN BLOOD BY AUTOMATED COUNT: 44.3 FL (ref 35.9–50)
GFR SERPLBLD CREATININE-BSD FMLA CKD-EPI: 98 ML/MIN/1.73 M 2
GLOBULIN SER CALC-MCNC: 2.4 G/DL (ref 1.9–3.5)
GLUCOSE SERPL-MCNC: 123 MG/DL (ref 65–99)
HCT VFR BLD AUTO: 37.9 % (ref 37–47)
HGB BLD-MCNC: 12.2 G/DL (ref 12–16)
IMM GRANULOCYTES # BLD AUTO: 0.13 K/UL (ref 0–0.11)
IMM GRANULOCYTES NFR BLD AUTO: 2.7 % (ref 0–0.9)
LYMPHOCYTES # BLD AUTO: 0.92 K/UL (ref 1–4.8)
LYMPHOCYTES NFR BLD: 19 % (ref 22–41)
MCH RBC QN AUTO: 28.1 PG (ref 27–33)
MCHC RBC AUTO-ENTMCNC: 32.2 G/DL (ref 32.2–35.5)
MCV RBC AUTO: 87.3 FL (ref 81.4–97.8)
MONOCYTES # BLD AUTO: 0.92 K/UL (ref 0–0.85)
MONOCYTES NFR BLD AUTO: 19 % (ref 0–13.4)
NEUTROPHILS # BLD AUTO: 2.78 K/UL (ref 1.82–7.42)
NEUTROPHILS NFR BLD: 57.7 % (ref 44–72)
NRBC # BLD AUTO: 0 K/UL
NRBC BLD-RTO: 0 /100 WBC (ref 0–0.2)
OUTPT INFUS CBC COMMENT OICOM: ABNORMAL
PLATELET # BLD AUTO: 371 K/UL (ref 164–446)
PMV BLD AUTO: 9.5 FL (ref 9–12.9)
POTASSIUM SERPL-SCNC: 4 MMOL/L (ref 3.6–5.5)
PROT SERPL-MCNC: 6.3 G/DL (ref 6–8.2)
RBC # BLD AUTO: 4.34 M/UL (ref 4.2–5.4)
SODIUM SERPL-SCNC: 143 MMOL/L (ref 135–145)
WBC # BLD AUTO: 4.8 K/UL (ref 4.8–10.8)

## 2023-06-13 PROCEDURE — A4212 NON CORING NEEDLE OR STYLET: HCPCS

## 2023-06-13 PROCEDURE — 80053 COMPREHEN METABOLIC PANEL: CPT

## 2023-06-13 PROCEDURE — 85025 COMPLETE CBC W/AUTO DIFF WBC: CPT

## 2023-06-13 PROCEDURE — 700111 HCHG RX REV CODE 636 W/ 250 OVERRIDE (IP): Performed by: NURSE PRACTITIONER

## 2023-06-13 PROCEDURE — 36591 DRAW BLOOD OFF VENOUS DEVICE: CPT

## 2023-06-13 RX ADMIN — HEPARIN 500 UNITS: 100 SYRINGE at 16:08

## 2023-06-13 NOTE — PROGRESS NOTES
"Pharmacy Chemotherapy Verification    Dx: Breast CA  Cycle: 3  Previous treatment = 5/24/23    Regimen and Dosing Reference  DOXOrubicin 60 mg/m2 IV push on Day 1  Cyclophosphamide 600 mg/m2 IV over 30 minutes on Day 1  21 day cycle for 4 cycles (neoadjuvant or adjuvant)  NCCN Guidelines for Breast Cancer.V.2.2022  Светлана VANCE et al. J Clin Oncol. 2003;21(6):968-75  Kade B, et al. J Clin Oncol. 1990;8(9):1483-96    Allergies:Penicillin g and Tetracycline  BP (!) 140/104   Pulse 100   Temp 36.9 °C (98.4 °F) (Temporal)   Resp 18   Ht 1.63 m (5' 4.17\")   Wt 105 kg (231 lb 4.2 oz)   SpO2 91%   BMI 39.48 kg/m²   Body surface area is 2.18 meters squared.    All lab results 6/13/23 within treatment parameters.     ECHO 4/25/23  LVEF 75%    DOXOrubicin 60 mg/m2 x 2.18m2 = 130.8mg   <10% difference, OK to treat with final dose = 130mg IV    Cyclophosphamide 600 mg/m2 x 2.18m2 = 1308mg   <10% difference, OK to treat with final dose = 1300mg IV    NANDINI Bolanos PharmPayalD.    "

## 2023-06-14 ENCOUNTER — HOSPITAL ENCOUNTER (OUTPATIENT)
Dept: HEMATOLOGY ONCOLOGY | Facility: MEDICAL CENTER | Age: 73
End: 2023-06-14
Attending: NURSE PRACTITIONER
Payer: MEDICARE

## 2023-06-14 ENCOUNTER — OUTPATIENT INFUSION SERVICES (OUTPATIENT)
Dept: ONCOLOGY | Facility: MEDICAL CENTER | Age: 73
End: 2023-06-14
Attending: INTERNAL MEDICINE
Payer: MEDICARE

## 2023-06-14 VITALS
OXYGEN SATURATION: 92 % | HEIGHT: 64 IN | TEMPERATURE: 98.9 F | SYSTOLIC BLOOD PRESSURE: 102 MMHG | DIASTOLIC BLOOD PRESSURE: 64 MMHG | HEART RATE: 94 BPM | BODY MASS INDEX: 37.77 KG/M2

## 2023-06-14 VITALS
RESPIRATION RATE: 18 BRPM | BODY MASS INDEX: 39.48 KG/M2 | OXYGEN SATURATION: 91 % | SYSTOLIC BLOOD PRESSURE: 140 MMHG | WEIGHT: 231.26 LBS | HEART RATE: 100 BPM | HEIGHT: 64 IN | TEMPERATURE: 98.4 F | DIASTOLIC BLOOD PRESSURE: 104 MMHG

## 2023-06-14 DIAGNOSIS — Z17.0 MALIGNANT NEOPLASM OF UPPER-INNER QUADRANT OF LEFT BREAST IN FEMALE, ESTROGEN RECEPTOR POSITIVE (HCC): ICD-10-CM

## 2023-06-14 DIAGNOSIS — C50.212 MALIGNANT NEOPLASM OF UPPER-INNER QUADRANT OF LEFT BREAST IN FEMALE, ESTROGEN RECEPTOR POSITIVE (HCC): ICD-10-CM

## 2023-06-14 DIAGNOSIS — Z79.899 ENCOUNTER FOR LONG-TERM (CURRENT) USE OF HIGH-RISK MEDICATION: ICD-10-CM

## 2023-06-14 PROCEDURE — 700105 HCHG RX REV CODE 258: Performed by: NURSE PRACTITIONER

## 2023-06-14 PROCEDURE — 96375 TX/PRO/DX INJ NEW DRUG ADDON: CPT

## 2023-06-14 PROCEDURE — 96413 CHEMO IV INFUSION 1 HR: CPT

## 2023-06-14 PROCEDURE — 99212 OFFICE O/P EST SF 10 MIN: CPT | Mod: 25 | Performed by: NURSE PRACTITIONER

## 2023-06-14 PROCEDURE — 96367 TX/PROPH/DG ADDL SEQ IV INF: CPT

## 2023-06-14 PROCEDURE — 99214 OFFICE O/P EST MOD 30 MIN: CPT | Performed by: NURSE PRACTITIONER

## 2023-06-14 PROCEDURE — 700111 HCHG RX REV CODE 636 W/ 250 OVERRIDE (IP): Performed by: NURSE PRACTITIONER

## 2023-06-14 PROCEDURE — A4212 NON CORING NEEDLE OR STYLET: HCPCS

## 2023-06-14 PROCEDURE — 96417 CHEMO IV INFUS EACH ADDL SEQ: CPT

## 2023-06-14 RX ADMIN — HEPARIN 500 UNITS: 100 SYRINGE at 15:27

## 2023-06-14 RX ADMIN — DEXAMETHASONE SODIUM PHOSPHATE 12 MG: 4 INJECTION, SOLUTION INTRA-ARTICULAR; INTRALESIONAL; INTRAMUSCULAR; INTRAVENOUS; SOFT TISSUE at 12:02

## 2023-06-14 RX ADMIN — DOXORUBICIN HYDROCHLORIDE 130 MG: 2 INJECTION, SOLUTION INTRAVENOUS at 13:24

## 2023-06-14 RX ADMIN — CYCLOPHOSPHAMIDE 1300 MG: 200 INJECTION, SOLUTION INTRAVENOUS at 14:17

## 2023-06-14 RX ADMIN — ONDANSETRON HYDROCHLORIDE 16 MG: 2 INJECTION, SOLUTION INTRAMUSCULAR; INTRAVENOUS at 12:21

## 2023-06-14 RX ADMIN — FOSAPREPITANT 150 MG: 150 INJECTION, POWDER, LYOPHILIZED, FOR SOLUTION INTRAVENOUS at 12:41

## 2023-06-14 ASSESSMENT — ENCOUNTER SYMPTOMS
HEADACHES: 1
FEVER: 0
MYALGIAS: 0
DIZZINESS: 0
SHORTNESS OF BREATH: 0
DIARRHEA: 0
TINGLING: 0
CHILLS: 0
VOMITING: 0
COUGH: 0
WEIGHT LOSS: 0
PALPITATIONS: 0
CONSTIPATION: 0
NAUSEA: 0

## 2023-06-14 ASSESSMENT — FIBROSIS 4 INDEX: FIB4 SCORE: 1.09

## 2023-06-14 NOTE — PROGRESS NOTES
"Pharmacy Chemotherapy Calculation    Dx: Breast cancer         Protocol: AC  Doxorubicin 60 mg/m2 IV over 20 minutes  Cyclophosphamide 600 mg/m2 IV over 30 minutes   21-day cycle for 4 cycles (adjuvant)  NCCN Guidelines for Breast Cancer V.2.2022.  Светлана VANCE, et al. J Clin Oncol. 2003;21(6):968-75.  Kade B et al. J Clin Oncol. 1990;8(9):1483-96.    Allergies:  Penicillin g and Tetracycline       BP (!) 140/104   Pulse 100   Temp 36.9 °C (98.4 °F) (Temporal)   Resp 18   Ht 1.63 m (5' 4.17\")   Wt 105 kg (231 lb 4.2 oz)   SpO2 91%   BMI 39.48 kg/m²  Body surface area is 2.18 meters squared.    ECHO:  4/25/23: 75%    Labs 6/13/23:  ANC~ 2780 Plt = 371k   Hgb = 12.2   SCr = 0.56 mg/dL CrCl ~ 123 mL/min   LFT's = WNL TBili = 0.2            Drug Order   (Drug name, dose, route, IV Fluid & volume, frequency, number of doses) Cycle 3  Previous treatment: C2 on 5/24/23     Medication = Doxorubicin  Base Dose= 60 mg/m2  Calc Dose: Base Dose x2.18 m2 = 130.8 mg  Final Dose = 130 mg  Route = IV  Drug conc. = 2 mg/mL  Fluid & Volume = 65 mL in empty bag  Admin Duration = Over 20 minutes          <10% difference, OK to treat with final dose   Medication = Cyclophosphamide  Base Dos]e = 600 mg/m2  Calc Dose: Base Dose x 2.18 m2 = 1308 mg  Final Dose = 1300 mg  Route = IV  Fluid & Volume =  mL  Admin Duration = Over 30-60 minutes          <10% difference, OK to treat with final dose     By my signature below, I confirm this process was performed independently with the BSA and all final chemotherapy dosing calculations congruent. I have reviewed the above chemotherapy order and that my calculation of the final dose and BSA (when applicable) corroborate those calculations of the  pharmacist. Discrepancies of 10% or greater in the written dose have been addressed and documented within the [facility] Progress notes.    Amber Crow, PharmD  "

## 2023-06-14 NOTE — PROGRESS NOTES
Subjective     Donna Palmer is a 72 y.o. female who presents with Breast Cancer (Prechemo)            HPI    Referring Physician: Lubna Donaldson MD  Primary Care:   [facility].     Diagnosis: Ductal carcinoma of the left breast, grade 3, clinically stage IIa (cT2, cN0) ER +95%, SD negative, HER2 IHC 2+ FISH negative, Ki-67 30%     Chief Complaint: Patient seen today for evaluation of her breast cancer, for continued monitoring of symptoms and side effects of cancer treatments, employing AC.     Oncology history of presenting illness:  Patient self detected a mass in her left breast in February 2023.  She had not had a mammogram for several years.  A diagnostic mammogram and ultrasound were completed on 3/6/2023 which showed a 2.7 x 2.4 x 2.0 cm spiculated irregular mass at 3 o'clock position of the left breast with no abnormal lymph nodes being detected.  She underwent an immediate ultrasound-guided biopsy demonstrated invasive ductal carcinoma, grade 3, at least 1.7 cm in the core, ER +95%, SD negative, Ki-67 30%, HER2 2+ IHC FISH negative.  She saw Dr. Donaldson in consultation and is planning to undergo partial mastectomy and sentinel lymph node biopsy.     Her history is notable for post radial syndrome with motor nerve impairment which is gotten gradually worse over the last couple of years.  She now has to use a scooter almost all the time and has started having impairment in activities of daily living.  Otherwise she has no chronic medical problems.  She has a maternal aunt who had breast cancer.  No other breast or ovarian cancer is noted.     Interval histories:  Interval history 4/20/2023: She underwent left partial mastectomy and sentinel lymph node biopsy on 4/4/2023.  Pathology demonstrated invasive ductal carcinoma 3.5 cm in greatest dimension with focal high-grade DCIS.  Margins were clear and there was no lymph vascular invasion.  1 sentinel lymph node was positive for  micrometastases and 1 sentinel lymph node was negative.  Postoperative course was unremarkable but she does feel a firm mass at the incision site which is consistent with a seroma in the breast.  We sent an Oncotype DX which came back with recurrence score of 39.  She has no history of heart problems but does have history of hypertension.     Interval history 05/10/23 (CAlsop, APRN):  Patient seen today for her toxicity check following her first dose of chemotherapy employing AC.  Patient appeared to tolerate treatment very well.  She has not required any antinausea medications.  She had very mild underlying nausea but again no need for medication.  She does complain of constipation which is new with treatment.  She did have a bowel movement last night.  She was quite constipated after treatment and ended up taking milk of magnesia dose on Sunday night which caused diarrhea.  She also noticed a very mild elevation in temperature but that resolved.  She states her appetite has been good and she is eating well.  She has noticed decrease in energy this past week as well.     Interval history 05/24/23 (CAlsop, APRN):  Patient is apprehensive and scared about cycle 2 but overall she has been doing well.  She stated that her constipation is resolved and she is having normal bowel movements.  She did state that her hair started to fall out so she recently shaved it which she has been dealing with mentally.  Otherwise no other significant clinical complaints.  Derm at the incision site is still present but appears to be decreasing in size.  According to the patient she did reach out to Dr. Donaldson and was informed that there was no concerns at this time.     Interval history 06/14/23 (CAlsop, APRN):  Patient has done well.  She did have what she believed to be a urinary tract infection.  UA was negative for any bacteria but she did have positive leukocytes.  She was quite symptomatic with pain, burning and pressure with  urination, therefore I did treat her with a short course of antibiotics with Macrobid.  She did complete the antibiotics and as of last night her symptoms have completely resolved.  She denies any foul odor or vaginal discharge.  She denies any nausea or vomiting.  Her constipation is well controlled.  She did have a headache for just a few days after treatment but otherwise no other complaints.    Treatment history:  04/04/23: Left partial mastectomy and sentinel lymph node biopsy - Dr. Donaldson  05/03/23: C1D1 Adriamycin and Cyclophosphamide  05/24/23: C2D1 Adriamycin and Cyclophosphamide  06/14/23: C3D1 Adriamycin and Cyclophosphamide      Allergies   Allergen Reactions    Penicillin G Hives     As a child    Tetracycline Vomiting     Current Outpatient Medications on File Prior to Encounter   Medication Sig Dispense Refill    polyethylene glycol/lytes (MIRALAX) 17 g Pack Take 17 g by mouth every day.      lidocaine-prilocaine (EMLA) 2.5-2.5 % Cream Apply to port site 1 hour prior to port access as needed daily 30 g 3    ondansetron (ZOFRAN) 4 MG Tab tablet Take 1 Tablet by mouth every four hours as needed for Nausea/Vomiting (for nausea, vomiting). 30 Tablet 6    prochlorperazine (COMPAZINE) 10 MG Tab Take 1 Tablet by mouth every 6 hours as needed (for nausea, vomiting). 30 Tablet 6    Multiple Vitamins-Minerals (MULTIVITAMIN ADULT PO) Take  by mouth.       No current facility-administered medications on file prior to encounter.       Review of Systems   Constitutional:  Negative for chills, fever and weight loss.   Respiratory:  Negative for cough and shortness of breath.    Cardiovascular:  Negative for chest pain and palpitations.   Gastrointestinal:  Negative for constipation, diarrhea, nausea and vomiting.   Genitourinary:  Negative for dysuria.   Musculoskeletal:  Negative for myalgias.   Neurological:  Positive for headaches (slightly for a few days after chemo and then resolves). Negative for dizziness and  "tingling.              Objective     /64 (BP Location: Right arm, Patient Position: Sitting)   Pulse 94   Temp 37.2 °C (98.9 °F) (Temporal)   Ht 1.626 m (5' 4\")   SpO2 92%   BMI 37.77 kg/m²      Physical Exam  Vitals reviewed.   Constitutional:       General: She is not in acute distress.     Appearance: Normal appearance. She is not diaphoretic.   HENT:      Head: Normocephalic and atraumatic.   Cardiovascular:      Rate and Rhythm: Normal rate and regular rhythm.      Heart sounds: Normal heart sounds. No murmur heard.     No friction rub. No gallop.   Pulmonary:      Effort: Pulmonary effort is normal. No respiratory distress.      Breath sounds: Normal breath sounds. No wheezing.   Abdominal:      General: Bowel sounds are normal. There is no distension.      Palpations: Abdomen is soft.      Tenderness: There is no abdominal tenderness.   Musculoskeletal:      Comments: Confined to a wheel chair   Skin:     General: Skin is warm and dry.   Neurological:      Mental Status: She is alert and oriented to person, place, and time.   Psychiatric:         Mood and Affect: Mood normal.         Behavior: Behavior normal.               Latest Reference Range & Units 06/13/23 16:08   WBC 4.8 - 10.8 K/uL 4.8   RBC 4.20 - 5.40 M/uL 4.34   Hemoglobin 12.0 - 16.0 g/dL 12.2   Hematocrit 37.0 - 47.0 % 37.9   MCV 81.4 - 97.8 fL 87.3   MCH 27.0 - 33.0 pg 28.1   MCHC 32.2 - 35.5 g/dL 32.2   RDW 35.9 - 50.0 fL 44.3   Platelet Count 164 - 446 K/uL 371   MPV 9.0 - 12.9 fL 9.5   Neutrophils-Polys 44.00 - 72.00 % 57.70   Neutrophils (Absolute) 1.82 - 7.42 K/uL 2.78   Lymphocytes 22.00 - 41.00 % 19.00 (L)   Lymphs (Absolute) 1.00 - 4.80 K/uL 0.92 (L)   Monocytes 0.00 - 13.40 % 19.00 (H)   Monos (Absolute) 0.00 - 0.85 K/uL 0.92 (H)   Eosinophils 0.00 - 6.90 % 0.60   Eos (Absolute) 0.00 - 0.51 K/uL 0.03   Basophils 0.00 - 1.80 % 1.00   Baso (Absolute) 0.00 - 0.12 K/uL 0.05   Immature Granulocytes 0.00 - 0.90 % 2.70 (H) "   Immature Granulocytes (abs) 0.00 - 0.11 K/uL 0.13 (H)   Nucleated RBC 0.00 - 0.20 /100 WBC 0.00   NRBC (Absolute) K/uL 0.00   Outpt Infus CBC Comment  see below   Sodium 135 - 145 mmol/L 143   Potassium 3.6 - 5.5 mmol/L 4.0   Chloride 96 - 112 mmol/L 106   Co2 20 - 33 mmol/L 24   Anion Gap 7.0 - 16.0  13.0   Glucose 65 - 99 mg/dL 123 (H)   Bun 8 - 22 mg/dL 10   Creatinine 0.50 - 1.40 mg/dL 0.56   GFR (CKD-EPI) >60 mL/min/1.73 m 2 98   Calcium 8.5 - 10.5 mg/dL 9.2   Correct Calcium 8.5 - 10.5 mg/dL 9.3   AST(SGOT) 12 - 45 U/L 27   ALT(SGPT) 2 - 50 U/L 22   Alkaline Phosphatase 30 - 99 U/L 76   Total Bilirubin 0.1 - 1.5 mg/dL 0.2   Albumin 3.2 - 4.9 g/dL 3.9   Total Protein 6.0 - 8.2 g/dL 6.3   Globulin 1.9 - 3.5 g/dL 2.4   A-G Ratio g/dL 1.6              Assessment & Plan       1. Malignant neoplasm of upper-inner quadrant of left breast in female, estrogen receptor positive (HCC)        2. Encounter for long-term (current) use of high-risk medication               Impression:  1.  Invasive ductal carcinoma of the left breast, grade 3, clinically stage IIa (cT2, cN0) ER +95%, TX negative, HER2 IHC 2+ FISH negative, Ki-67 30%.  Status post left partial mastectomy and sentinel lymph node biopsy for invasive ductal carcinoma, grade 3, stage IIb (pT2, PN 1 LEXI).  Oncotype DX recurrence score 39.  She is a good candidate for adjuvant chemotherapy.  Due to her postpolio syndrome would avoid taxanes and patient to receive AC x4 if her echocardiogram is normal.  2.  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  3.  Constipation -related to Zofran premedication or chemotherapy.  Discussed with patient getting on a more regular schedule with daily MiraLAX especially the first week after chemotherapy.  Discussed the possibility of changing premed Zofran to Aloxi but she is not certain if she wants to do that as she did have good nausea control with her cycle 1  treatment.    Plan:  Patient is doing very well.  CBC, CMP labs are appropriate and she is okay to proceed with cycle 3 today.  Patient to return in 3 weeks, or sooner if needed prior to cycle 4.      Please note that this dictation was created using voice recognition software. I have made every reasonable attempt to correct obvious errors, but I expect that there are errors of grammar and possibly content that I did not discover before finalizing the note.

## 2023-06-14 NOTE — ADDENDUM NOTE
Encounter addended by: William Hillman, Med Ass't on: 6/14/2023 11:31 AM   Actions taken: Charge Capture section accepted

## 2023-06-14 NOTE — PROGRESS NOTES
Chemotherapy Verification - PRIMARY RN      Height = 163 cm  Weight = 105 kg  BSA = 2.18 m^2       Medication: doxorubicin  Dose: 60 mg/m^2  Calculated Dose: 129.6 mg                             (In mg/m2, AUC, mg/kg)     Medication: cyclophosphamide  Dose: 600 mg/m^2  Calculated Dose: 1308 mg                             (In mg/m2, AUC, mg/kg)          I confirm this process was performed independently with the BSA and all final chemotherapy dosing calculations congruent.  Any discrepancies of 10% or greater have been addressed with the chemotherapy pharmacist. The resolution of the discrepancy has been documented in the EPIC progress notes.

## 2023-06-14 NOTE — PROGRESS NOTES
Pt arrives to [facility] for pre-chemotherapy labs via port.  Pt presents with lab kit to be drawn today from Dr. Zaman's office.  [facility] port accessed with sterile technique, flushed with NS and brisk blood return observed.  All labs drawn via port.  Port flushed with NS and heparin locked.  [place] needle removed intact.  Site covered with gauze/tape.  Confirmed tomorrow's appt time with pt.  Pt will take lab kit up to MD office to be processed.  Pt dc home with family.

## 2023-06-14 NOTE — PROGRESS NOTES
Chemotherapy Verification - SECONDARY RN       Height = 1.63m  Weight = 105kg  BSA = 2.18m2       Medication: doxorubicin  Dose: 60mg/m2  Calculated Dose: 130.8mg                             (In mg/m2, AUC, mg/kg)     Medication: cyclophosphamide  Dose: 600mg/m2  Calculated Dose: 1308mg                             (In mg/m2, AUC, mg/kg)      I confirm that this process was performed independently.

## 2023-06-15 NOTE — PROGRESS NOTES
Donna [facility] for Day [facility]/ Cycle 3 for doxorubicin and cytoxan. Pt denied having any new or acute complaints today, reports tolerating past treatments well. Port accessed in a sterile manner, had + blood return, flushed briskly. Pt given pre-medications, doxorubicin, and cytoxan as prescribed, tolerated well, denied having any complaints during or after infusion. Port had + blood return after, flushed per [facility] policy, de-accessed, needle intact, insertion site covered with sterile gauze and paper tape. Next appointment scheduled, Donna discharged home to self care with  via scooter.

## 2023-06-29 RX ORDER — 0.9 % SODIUM CHLORIDE 0.9 %
10 VIAL (ML) INJECTION PRN
Status: CANCELLED | OUTPATIENT
Start: 2023-07-04

## 2023-06-29 RX ORDER — 0.9 % SODIUM CHLORIDE 0.9 %
10 VIAL (ML) INJECTION PRN
Status: CANCELLED | OUTPATIENT
Start: 2023-07-05

## 2023-06-29 RX ORDER — 0.9 % SODIUM CHLORIDE 0.9 %
VIAL (ML) INJECTION PRN
Status: CANCELLED | OUTPATIENT
Start: 2023-07-04

## 2023-06-29 RX ORDER — ONDANSETRON 2 MG/ML
4 INJECTION INTRAMUSCULAR; INTRAVENOUS PRN
Status: CANCELLED | OUTPATIENT
Start: 2023-07-05

## 2023-06-29 RX ORDER — SODIUM CHLORIDE 9 MG/ML
INJECTION, SOLUTION INTRAVENOUS CONTINUOUS
Status: CANCELLED | OUTPATIENT
Start: 2023-07-05

## 2023-06-29 RX ORDER — ONDANSETRON 8 MG/1
8 TABLET, ORALLY DISINTEGRATING ORAL PRN
Status: CANCELLED | OUTPATIENT
Start: 2023-07-05

## 2023-06-29 RX ORDER — 0.9 % SODIUM CHLORIDE 0.9 %
3 VIAL (ML) INJECTION PRN
Status: CANCELLED | OUTPATIENT
Start: 2023-07-04

## 2023-06-29 RX ORDER — 0.9 % SODIUM CHLORIDE 0.9 %
3 VIAL (ML) INJECTION PRN
Status: CANCELLED | OUTPATIENT
Start: 2023-07-05

## 2023-06-29 RX ORDER — PROCHLORPERAZINE MALEATE 10 MG
10 TABLET ORAL EVERY 6 HOURS PRN
Status: CANCELLED | OUTPATIENT
Start: 2023-07-05

## 2023-06-29 RX ORDER — 0.9 % SODIUM CHLORIDE 0.9 %
VIAL (ML) INJECTION PRN
Status: CANCELLED | OUTPATIENT
Start: 2023-07-05

## 2023-07-03 ASSESSMENT — LIFESTYLE VARIABLES
SMOKING_STATUS: NO
TOBACCO_USE: NO

## 2023-07-03 NOTE — PROGRESS NOTES
"Pharmacy Chemotherapy Verification    Dx: Breast cancer         Protocol: AC  DOXOrubicin 60 mg/m2 IV over 20 minutes  Cyclophosphamide 600 mg/m2 IV over 30 minutes  21-day cycle for 4 cycles (adjuvant)  NCCN Guidelines for Breast Cancer V.2.2022.  Светлана VANCE, et al. J Clin Oncol. 2003;21(6):968-75.  Kade B, et al. J Clin Oncol. 1990;8(9):1483-96.    Allergies:  Penicillin g and Tetracycline     BP (!) 146/92   Pulse 97   Temp 36.2 °C (97.2 °F) (Temporal)   Resp 18   Ht 1.57 m (5' 1.81\")   Wt 104 kg (229 lb 0.9 oz)   SpO2 96%   BMI 42.15 kg/m²  Body surface area is 2.13 meters squared.    Labs 7/4/23  ANC~ 2950 Hgb 11.3 Plt 349k  SCr 0.61 CrCl 121.6 mL/min   AST/ALT/AP = 19/19/75 Tbili = 0.2    ECHO 4/25/23  LVEF 75%    Drug Order   (Drug name, dose, route, IV Fluid & volume, frequency, number of doses) Cycle 4  Previous treatment: C3 6/14/23     Medication = DOXOrubicin  Base Dose = 60 mg/m2  Calc Dose: Base Dose x 2.13 m2 = 127.8 mg  Final Dose = 130 mg  Route = IV  Drug conc. = 2 mg/mL  Fluid & Volume = 65 mL in empty bag  Admin Duration = Over 20 minutes          <10% difference, OK to treat with final dose   Medication = Cyclophosphamide  Base Dose = 600 mg/m2  Calc Dose: Base Dose x 2.13 m2 = 1278 mg  Final Dose = 1300 mg  Route = IV  Fluid & Volume =  mL  Admin Duration = Over 30-60 minutes          <10% difference, OK to treat with final dose     By my signature below, I confirm this process was performed independently with the BSA and all final chemotherapy dosing calculations congruent. I have reviewed the above chemotherapy order and that my calculation of the final dose and BSA (when applicable) corroborate those calculations of the  pharmacist. Discrepancies of 10% or greater in the written dose have been addressed and documented within the [facility] Progress notes.    Stephanie Zhou, PharmD, BCOP    "

## 2023-07-04 ENCOUNTER — OUTPATIENT INFUSION SERVICES (OUTPATIENT)
Dept: ONCOLOGY | Facility: MEDICAL CENTER | Age: 73
End: 2023-07-04
Attending: INTERNAL MEDICINE
Payer: MEDICARE

## 2023-07-04 VITALS
TEMPERATURE: 98 F | OXYGEN SATURATION: 96 % | SYSTOLIC BLOOD PRESSURE: 152 MMHG | WEIGHT: 230.38 LBS | DIASTOLIC BLOOD PRESSURE: 88 MMHG | BODY MASS INDEX: 42.4 KG/M2 | RESPIRATION RATE: 18 BRPM | HEART RATE: 104 BPM | HEIGHT: 62 IN

## 2023-07-04 DIAGNOSIS — C50.212 MALIGNANT NEOPLASM OF UPPER-INNER QUADRANT OF LEFT BREAST IN FEMALE, ESTROGEN RECEPTOR POSITIVE (HCC): ICD-10-CM

## 2023-07-04 DIAGNOSIS — Z17.0 MALIGNANT NEOPLASM OF UPPER-INNER QUADRANT OF LEFT BREAST IN FEMALE, ESTROGEN RECEPTOR POSITIVE (HCC): ICD-10-CM

## 2023-07-04 LAB
ALBUMIN SERPL BCP-MCNC: 4.2 G/DL (ref 3.2–4.9)
ALBUMIN/GLOB SERPL: 2 G/DL
ALP SERPL-CCNC: 75 U/L (ref 30–99)
ALT SERPL-CCNC: 19 U/L (ref 2–50)
ANION GAP SERPL CALC-SCNC: 15 MMOL/L (ref 7–16)
AST SERPL-CCNC: 19 U/L (ref 12–45)
BASOPHILS # BLD AUTO: 0.9 % (ref 0–1.8)
BASOPHILS # BLD: 0.04 K/UL (ref 0–0.12)
BILIRUB SERPL-MCNC: 0.2 MG/DL (ref 0.1–1.5)
BUN SERPL-MCNC: 12 MG/DL (ref 8–22)
CALCIUM ALBUM COR SERPL-MCNC: 9 MG/DL (ref 8.5–10.5)
CALCIUM SERPL-MCNC: 9.2 MG/DL (ref 8.5–10.5)
CHLORIDE SERPL-SCNC: 105 MMOL/L (ref 96–112)
CO2 SERPL-SCNC: 21 MMOL/L (ref 20–33)
CREAT SERPL-MCNC: 0.61 MG/DL (ref 0.5–1.4)
EOSINOPHIL # BLD AUTO: 0.02 K/UL (ref 0–0.51)
EOSINOPHIL NFR BLD: 0.4 % (ref 0–6.9)
ERYTHROCYTE [DISTWIDTH] IN BLOOD BY AUTOMATED COUNT: 47.2 FL (ref 35.9–50)
GFR SERPLBLD CREATININE-BSD FMLA CKD-EPI: 96 ML/MIN/1.73 M 2
GLOBULIN SER CALC-MCNC: 2.1 G/DL (ref 1.9–3.5)
GLUCOSE SERPL-MCNC: 136 MG/DL (ref 65–99)
HCT VFR BLD AUTO: 35.6 % (ref 37–47)
HGB BLD-MCNC: 11.3 G/DL (ref 12–16)
IMM GRANULOCYTES # BLD AUTO: 0.08 K/UL (ref 0–0.11)
IMM GRANULOCYTES NFR BLD AUTO: 1.8 % (ref 0–0.9)
LYMPHOCYTES # BLD AUTO: 0.7 K/UL (ref 1–4.8)
LYMPHOCYTES NFR BLD: 15.4 % (ref 22–41)
MCH RBC QN AUTO: 28.4 PG (ref 27–33)
MCHC RBC AUTO-ENTMCNC: 31.7 G/DL (ref 32.2–35.5)
MCV RBC AUTO: 89.4 FL (ref 81.4–97.8)
MONOCYTES # BLD AUTO: 0.76 K/UL (ref 0–0.85)
MONOCYTES NFR BLD AUTO: 16.7 % (ref 0–13.4)
NEUTROPHILS # BLD AUTO: 2.95 K/UL (ref 1.82–7.42)
NEUTROPHILS NFR BLD: 64.8 % (ref 44–72)
NRBC # BLD AUTO: 0 K/UL
NRBC BLD-RTO: 0 /100 WBC (ref 0–0.2)
OUTPT INFUS CBC COMMENT OICOM: ABNORMAL
PLATELET # BLD AUTO: 349 K/UL (ref 164–446)
PMV BLD AUTO: 9.4 FL (ref 9–12.9)
POTASSIUM SERPL-SCNC: 3.9 MMOL/L (ref 3.6–5.5)
PROT SERPL-MCNC: 6.3 G/DL (ref 6–8.2)
RBC # BLD AUTO: 3.98 M/UL (ref 4.2–5.4)
SODIUM SERPL-SCNC: 141 MMOL/L (ref 135–145)
WBC # BLD AUTO: 4.6 K/UL (ref 4.8–10.8)

## 2023-07-04 PROCEDURE — 700111 HCHG RX REV CODE 636 W/ 250 OVERRIDE (IP): Performed by: NURSE PRACTITIONER

## 2023-07-04 PROCEDURE — 80053 COMPREHEN METABOLIC PANEL: CPT

## 2023-07-04 PROCEDURE — A4212 NON CORING NEEDLE OR STYLET: HCPCS

## 2023-07-04 PROCEDURE — 36592 COLLECT BLOOD FROM PICC: CPT

## 2023-07-04 PROCEDURE — 85025 COMPLETE CBC W/AUTO DIFF WBC: CPT

## 2023-07-04 RX ADMIN — HEPARIN 500 UNITS: 100 SYRINGE at 16:21

## 2023-07-04 ASSESSMENT — FIBROSIS 4 INDEX: FIB4 SCORE: 1.09

## 2023-07-05 ENCOUNTER — OUTPATIENT INFUSION SERVICES (OUTPATIENT)
Dept: ONCOLOGY | Facility: MEDICAL CENTER | Age: 73
End: 2023-07-05
Attending: INTERNAL MEDICINE
Payer: MEDICARE

## 2023-07-05 ENCOUNTER — HOSPITAL ENCOUNTER (OUTPATIENT)
Dept: HEMATOLOGY ONCOLOGY | Facility: MEDICAL CENTER | Age: 73
End: 2023-07-05
Attending: INTERNAL MEDICINE
Payer: MEDICARE

## 2023-07-05 VITALS
HEART RATE: 97 BPM | BODY MASS INDEX: 42.15 KG/M2 | DIASTOLIC BLOOD PRESSURE: 92 MMHG | TEMPERATURE: 97.2 F | RESPIRATION RATE: 18 BRPM | WEIGHT: 229.06 LBS | OXYGEN SATURATION: 96 % | SYSTOLIC BLOOD PRESSURE: 146 MMHG | HEIGHT: 62 IN

## 2023-07-05 VITALS
WEIGHT: 226.74 LBS | SYSTOLIC BLOOD PRESSURE: 132 MMHG | BODY MASS INDEX: 38.71 KG/M2 | RESPIRATION RATE: 16 BRPM | HEART RATE: 98 BPM | TEMPERATURE: 98.7 F | OXYGEN SATURATION: 94 % | DIASTOLIC BLOOD PRESSURE: 70 MMHG | HEIGHT: 64 IN

## 2023-07-05 DIAGNOSIS — C50.212 MALIGNANT NEOPLASM OF UPPER-INNER QUADRANT OF LEFT BREAST IN FEMALE, ESTROGEN RECEPTOR POSITIVE (HCC): ICD-10-CM

## 2023-07-05 DIAGNOSIS — Z17.0 MALIGNANT NEOPLASM OF UPPER-INNER QUADRANT OF LEFT BREAST IN FEMALE, ESTROGEN RECEPTOR POSITIVE (HCC): ICD-10-CM

## 2023-07-05 PROCEDURE — 99212 OFFICE O/P EST SF 10 MIN: CPT | Mod: 25 | Performed by: INTERNAL MEDICINE

## 2023-07-05 PROCEDURE — 700111 HCHG RX REV CODE 636 W/ 250 OVERRIDE (IP): Performed by: NURSE PRACTITIONER

## 2023-07-05 PROCEDURE — A4212 NON CORING NEEDLE OR STYLET: HCPCS

## 2023-07-05 PROCEDURE — 99214 OFFICE O/P EST MOD 30 MIN: CPT | Performed by: INTERNAL MEDICINE

## 2023-07-05 PROCEDURE — 96417 CHEMO IV INFUS EACH ADDL SEQ: CPT

## 2023-07-05 PROCEDURE — 96367 TX/PROPH/DG ADDL SEQ IV INF: CPT

## 2023-07-05 PROCEDURE — 96413 CHEMO IV INFUSION 1 HR: CPT

## 2023-07-05 PROCEDURE — 700105 HCHG RX REV CODE 258: Performed by: NURSE PRACTITIONER

## 2023-07-05 PROCEDURE — 96375 TX/PRO/DX INJ NEW DRUG ADDON: CPT

## 2023-07-05 RX ADMIN — DOXORUBICIN HYDROCHLORIDE 130 MG: 2 INJECTION, SOLUTION INTRAVENOUS at 13:04

## 2023-07-05 RX ADMIN — SODIUM CHLORIDE 150 MG: 9 INJECTION, SOLUTION INTRAVENOUS at 12:26

## 2023-07-05 RX ADMIN — HEPARIN 500 UNITS: 100 SYRINGE at 15:25

## 2023-07-05 RX ADMIN — ONDANSETRON 16 MG: 2 INJECTION INTRAMUSCULAR; INTRAVENOUS at 12:00

## 2023-07-05 RX ADMIN — CYCLOPHOSPHAMIDE 1300 MG: 200 INJECTION, SOLUTION INTRAVENOUS at 14:01

## 2023-07-05 RX ADMIN — DEXAMETHASONE SODIUM PHOSPHATE 12 MG: 4 INJECTION, SOLUTION INTRA-ARTICULAR; INTRALESIONAL; INTRAMUSCULAR; INTRAVENOUS; SOFT TISSUE at 11:45

## 2023-07-05 ASSESSMENT — ENCOUNTER SYMPTOMS
NAUSEA: 0
SHORTNESS OF BREATH: 0
PALPITATIONS: 0
CONSTIPATION: 0
HEADACHES: 1
CHILLS: 0
DIZZINESS: 0
WEIGHT LOSS: 0
FEVER: 0
COUGH: 0
VOMITING: 0
TINGLING: 0
DIARRHEA: 0
MYALGIAS: 0

## 2023-07-05 ASSESSMENT — FIBROSIS 4 INDEX
FIB4 SCORE: 0.87
FIB4 SCORE: 0.87

## 2023-07-05 NOTE — LETTER
[facility]   [address] [place] [address],   [address]  [place] [address]  Phone: [phone]  Fax: [phone]              Donna Palmer  1950    Encounter Date: 7/5/2023    Ludin Zaman M.D.          PROGRESS NOTE:  Subjective   Medical oncology visit: 7/5/2023  Donna Palmer is a 72 y.o. female who presents with ER positive breast cancer receiving adjuvant chemotherapy with AC          HPI    Referring Physician: Lubna Donaldson MD  Primary Care:   AUGUSTA Gallego     Diagnosis: Ductal carcinoma of the left breast, grade 3, clinically stage IIa (cT2, cN0) ER +95%, AL negative, HER2 IHC 2+ FISH negative, Ki-67 30%     Chief Complaint: Patient seen today for evaluation of her breast cancer, for continued monitoring of symptoms and side effects of cancer treatments, employing AC.     Oncology history of presenting illness:  Patient self detected a mass in her left breast in February 2023.  She had not had a mammogram for several years.  A diagnostic mammogram and ultrasound were completed on 3/6/2023 which showed a 2.7 x 2.4 x 2.0 cm spiculated irregular mass at 3 o'clock position of the left breast with no abnormal lymph nodes being detected.  She underwent an immediate ultrasound-guided biopsy demonstrated invasive ductal carcinoma, grade 3, at least 1.7 cm in the core, ER +95%, AL negative, Ki-67 30%, HER2 2+ IHC FISH negative.  She saw Dr. Donaldson in consultation and is planning to undergo partial mastectomy and sentinel lymph node biopsy.     Her history is notable for post radial syndrome with motor nerve impairment which is gotten gradually worse over the last couple of years.  She now has to use a scooter almost all the time and has started having impairment in activities of daily living.  Otherwise she has no chronic medical problems.  She has a maternal aunt who had breast cancer.  No other breast or ovarian cancer is noted.     Interval histories:  Interval history  4/20/2023: She underwent left partial mastectomy and sentinel lymph node biopsy on 4/4/2023.  Pathology demonstrated invasive ductal carcinoma 3.5 cm in greatest dimension with focal high-grade DCIS.  Margins were clear and there was no lymph vascular invasion.  1 sentinel lymph node was positive for micrometastases and 1 sentinel lymph node was negative.  Postoperative course was unremarkable but she does feel a firm mass at the incision site which is consistent with a seroma in the breast.  We sent an Oncotype DX which came back with recurrence score of 39.  She has no history of heart problems but does have history of hypertension.     Interval history 05/10/23 (CAlsop, APRN):  Patient seen today for her toxicity check following her first dose of chemotherapy employing AC.  Patient appeared to tolerate treatment very well.  She has not required any antinausea medications.  She had very mild underlying nausea but again no need for medication.  She does complain of constipation which is new with treatment.  She did have a bowel movement last night.  She was quite constipated after treatment and ended up taking milk of magnesia dose on Sunday night which caused diarrhea.  She also noticed a very mild elevation in temperature but that resolved.  She states her appetite has been good and she is eating well.  She has noticed decrease in energy this past week as well.     Interval history 05/24/23 (CAlsop, APRN):  Patient is apprehensive and scared about cycle 2 but overall she has been doing well.  She stated that her constipation is resolved and she is having normal bowel movements.  She did state that her hair started to fall out so she recently shaved it which she has been dealing with mentally.  Otherwise no other significant clinical complaints.  Derm at the incision site is still present but appears to be decreasing in size.  According to the patient she did reach out to Dr. Donaldson and was informed that there was no  concerns at this time.     Interval history 06/14/23 (Yulissa, APRN):  Patient has done well.  She did have what she believed to be a urinary tract infection.  UA was negative for any bacteria but she did have positive leukocytes.  She was quite symptomatic with pain, burning and pressure with urination, therefore I did treat her with a short course of antibiotics with Macrobid.  She did complete the antibiotics and as of last night her symptoms have completely resolved.  She denies any foul odor or vaginal discharge.  She denies any nausea or vomiting.  Her constipation is well controlled.  She did have a headache for just a few days after treatment but otherwise no other complaints.    Interval history 7/5/2023: She comes in for her fourth and final cycle of Adriamycin and cyclophosphamide.  Overall she has done well with it.  She has about a week of feeling Malays and puny after treatment.  She has minimal nausea and has not taken any antiemetics after her premeds.  She did get a headache again but it was better than the previous time.  She does not wish to change her antiemetic regimen.  She had symptoms of urinary tract infection but a negative UA after the last cycle again and this is likely mucosal irritation.  She will be seeing radiation next week.  She has generalized weakness but no new focal motor weakness.    Treatment history:  04/04/23: Left partial mastectomy and sentinel lymph node biopsy - Dr. Donaldson  05/03/23: C1D1 Adriamycin and Cyclophosphamide  05/24/23: C2D1 Adriamycin and Cyclophosphamide  06/14/23: C3D1 Adriamycin and Cyclophosphamide  7/5/2023: C4 D1 Adriamycin and cyclophosphamide      Allergies   Allergen Reactions   • Penicillin G Hives     As a child   • Tetracycline Vomiting     Current Outpatient Medications on File Prior to Encounter   Medication Sig Dispense Refill   • lidocaine-prilocaine (EMLA) 2.5-2.5 % Cream Apply to port site 1 hour prior to port access as needed daily 30 g 3   •  "ondansetron (ZOFRAN) 4 MG Tab tablet Take 1 Tablet by mouth every four hours as needed for Nausea/Vomiting (for nausea, vomiting). 30 Tablet 6   • prochlorperazine (COMPAZINE) 10 MG Tab Take 1 Tablet by mouth every 6 hours as needed (for nausea, vomiting). 30 Tablet 6   • Multiple Vitamins-Minerals (MULTIVITAMIN ADULT PO) Take  by mouth.     • polyethylene glycol/lytes (MIRALAX) 17 g Pack Take 17 g by mouth every day. (Patient not taking: Reported on 7/5/2023)       No current facility-administered medications on file prior to encounter.       Review of Systems   Constitutional:  Negative for chills, fever and weight loss.   Respiratory:  Negative for cough and shortness of breath.    Cardiovascular:  Negative for chest pain and palpitations.   Gastrointestinal:  Negative for constipation, diarrhea, nausea and vomiting.   Genitourinary:  Negative for dysuria.   Musculoskeletal:  Negative for myalgias.   Neurological:  Positive for headaches (slightly for a few days after chemo and then resolves). Negative for dizziness and tingling.              Objective     /70   Pulse 98   Temp 37.1 °C (98.7 °F) (Temporal)   Resp 16   Ht 1.626 m (5' 4\")   Wt 103 kg (226 lb 11.9 oz)   SpO2 94%   BMI 38.92 kg/m²      Physical Exam  Vitals reviewed.   Constitutional:       General: She is not in acute distress.     Appearance: Normal appearance. She is not diaphoretic.   HENT:      Head: Normocephalic and atraumatic.   Cardiovascular:      Rate and Rhythm: Normal rate and regular rhythm.      Heart sounds: Normal heart sounds. No murmur heard.     No friction rub. No gallop.   Pulmonary:      Effort: Pulmonary effort is normal. No respiratory distress.      Breath sounds: Normal breath sounds. No wheezing.   Abdominal:      General: Bowel sounds are normal. There is no distension.      Palpations: Abdomen is soft.      Tenderness: There is no abdominal tenderness.   Musculoskeletal:      Comments: Confined to a wheel " chair   Skin:     General: Skin is warm and dry.   Neurological:      Mental Status: She is alert and oriented to person, place, and time.   Psychiatric:         Mood and Affect: Mood normal.         Behavior: Behavior normal.               Latest Reference Range & Units 06/13/23 16:08   WBC 4.8 - 10.8 K/uL 4.8   RBC 4.20 - 5.40 M/uL 4.34   Hemoglobin 12.0 - 16.0 g/dL 12.2   Hematocrit 37.0 - 47.0 % 37.9   MCV 81.4 - 97.8 fL 87.3   MCH 27.0 - 33.0 pg 28.1   MCHC 32.2 - 35.5 g/dL 32.2   RDW 35.9 - 50.0 fL 44.3   Platelet Count 164 - 446 K/uL 371   MPV 9.0 - 12.9 fL 9.5   Neutrophils-Polys 44.00 - 72.00 % 57.70   Neutrophils (Absolute) 1.82 - 7.42 K/uL 2.78   Lymphocytes 22.00 - 41.00 % 19.00 (L)   Lymphs (Absolute) 1.00 - 4.80 K/uL 0.92 (L)   Monocytes 0.00 - 13.40 % 19.00 (H)   Monos (Absolute) 0.00 - 0.85 K/uL 0.92 (H)   Eosinophils 0.00 - 6.90 % 0.60   Eos (Absolute) 0.00 - 0.51 K/uL 0.03   Basophils 0.00 - 1.80 % 1.00   Baso (Absolute) 0.00 - 0.12 K/uL 0.05   Immature Granulocytes 0.00 - 0.90 % 2.70 (H)   Immature Granulocytes (abs) 0.00 - 0.11 K/uL 0.13 (H)   Nucleated RBC 0.00 - 0.20 /100 WBC 0.00   NRBC (Absolute) K/uL 0.00   Outpt Infus CBC Comment  see below   Sodium 135 - 145 mmol/L 143   Potassium 3.6 - 5.5 mmol/L 4.0   Chloride 96 - 112 mmol/L 106   Co2 20 - 33 mmol/L 24   Anion Gap 7.0 - 16.0  13.0   Glucose 65 - 99 mg/dL 123 (H)   Bun 8 - 22 mg/dL 10   Creatinine 0.50 - 1.40 mg/dL 0.56   GFR (CKD-EPI) >60 mL/min/1.73 m 2 98   Calcium 8.5 - 10.5 mg/dL 9.2   Correct Calcium 8.5 - 10.5 mg/dL 9.3   AST(SGOT) 12 - 45 U/L 27   ALT(SGPT) 2 - 50 U/L 22   Alkaline Phosphatase 30 - 99 U/L 76   Total Bilirubin 0.1 - 1.5 mg/dL 0.2   Albumin 3.2 - 4.9 g/dL 3.9   Total Protein 6.0 - 8.2 g/dL 6.3   Globulin 1.9 - 3.5 g/dL 2.4   A-G Ratio g/dL 1.6              Assessment & Plan        Impression:  1.  Invasive ductal carcinoma of the left breast, grade 3, clinically stage IIa (cT2, cN0) ER +95%, AR negative, HER2 IHC  2+ FISH negative, Ki-67 30%.  Status post left partial mastectomy and sentinel lymph node biopsy for invasive ductal carcinoma, grade 3, stage IIb (pT2, PN 1 LEXI).  Oncotype DX recurrence score 39.  She is a good candidate for adjuvant chemotherapy.  Due to her postpolio syndrome would avoid taxanes and patient is receiving AC x4 adjuvant chemo therapy  2.  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  Stable.  3.  Constipation improved  4.  Headaches after chemotherapy resolved    Plan:  Complete AC chemotherapy with same antiemetic regimen today.  We will see her back in 4 weeks for routine follow-up.    Please note that this dictation was created using voice recognition software. I have made every reasonable attempt to correct obvious errors, but I expect that there are errors of grammar and possibly content that I did not discover before finalizing the note.              Lubna Donaldson M.D.  [address] [facility] [address]  [facility] [address]  Via Fax: [phone]

## 2023-07-05 NOTE — PROGRESS NOTES
Chemotherapy Verification - PRIMARY RN      Height = 1.57m  Weight = 104kg  BSA = 2.13m^2       Medication: Doxorubicin  Dose: 60mg/m^2  Calculated Dose: 127.8mg                             (In mg/m2, AUC, mg/kg)     Medication: Cyclophosphamide  Dose: 600mg/m^2  Calculated Dose: 1278mg                             (In mg/m2, AUC, mg/kg)      I confirm this process was performed independently with the BSA and all final chemotherapy dosing calculations congruent.  Any discrepancies of 10% or greater have been addressed with the chemotherapy pharmacist. The resolution of the discrepancy has been documented in the EPIC progress notes.

## 2023-07-05 NOTE — PROGRESS NOTES
Chemotherapy Verification - SECONDARY RN       Height = 157 cm  Weight = 104 kg  BSA = 2.13 m2       Medication: Doxorubicin  Dose: 60 mg/m2  Calculated Dose: 127.8 mg                             (In mg/m2, AUC, mg/kg)     Medication: Cytoxan  Dose: 600 mg/m2  Calculated Dose: 1278 mg                             (In mg/m2, AUC, mg/kg)    I confirm that this process was performed independently.

## 2023-07-05 NOTE — PROGRESS NOTES
"Pharmacy Chemotherapy Verification    Dx: Breast CA  Cycle: 4  Previous treatment = 6/14/23    Regimen and Dosing Reference  DOXOrubicin 60 mg/m2 IV push on Day 1  Cyclophosphamide 600 mg/m2 IV over 30 minutes on Day 1  21 day cycle for 4 cycles (neoadjuvant or adjuvant)  NCCN Guidelines for Breast Cancer.V.2.2022  Светлана VANCE et al. J Clin Oncol. 2003;21(6):968-75  Kade B, et al. J Clin Oncol. 1990;8(9):1483-96    Allergies:Penicillin g and Tetracycline  BP (!) 146/92   Pulse 97   Temp 36.2 °C (97.2 °F) (Temporal)   Resp 18   Ht 1.57 m (5' 1.81\")   Wt 104 kg (229 lb 0.9 oz)   SpO2 96%   BMI 42.15 kg/m²   Body surface area is 2.13 meters squared.    All lab results 7/5/23 within treatment parameters.     ECHO 4/25/23  LVEF 75%    DOXOrubicin 60 mg/m2 x 2.13m2 = 127.8mg   <10% difference, OK to treat with final dose = 130mg IV    Cyclophosphamide 600 mg/m2 x 2.13m2 = 1278mg   <10% difference, OK to treat with final dose = 1300mg IV    NANDINI Bolanos PharmPayalD.  "

## 2023-07-05 NOTE — ADDENDUM NOTE
Encounter addended by: Korinne Mitchell, Med Ass't on: 7/5/2023 11:13 AM   Actions taken: Charge Capture section accepted

## 2023-07-05 NOTE — PROGRESS NOTES
Donna came into infusion services today for pre-chemo labs. No complaints. ort accessed in a sterile manner, had + blood return, labs drawn, flushed briskly. Port had + blood return after, flushed per [facility] policy, de-accessed, needle intact, insertion site covered with sterile gauze and paper tape. Pt has future appointments. Pt discharged to self care .

## 2023-07-05 NOTE — PROGRESS NOTES
Subjective   Medical oncology visit: 7/5/2023  Donna Palmer is a 72 y.o. female who presents with ER positive breast cancer receiving adjuvant chemotherapy with AC          HPI    Referring Physician: Lubna Donaldson MD  Primary Care:   ASA Gallego.     Diagnosis: Ductal carcinoma of the left breast, grade 3, clinically stage IIa (cT2, cN0) ER +95%, IA negative, HER2 IHC 2+ FISH negative, Ki-67 30%     Chief Complaint: Patient seen today for evaluation of her breast cancer, for continued monitoring of symptoms and side effects of cancer treatments, employing AC.     Oncology history of presenting illness:  Patient self detected a mass in her left breast in February 2023.  She had not had a mammogram for several years.  A diagnostic mammogram and ultrasound were completed on 3/6/2023 which showed a 2.7 x 2.4 x 2.0 cm spiculated irregular mass at 3 o'clock position of the left breast with no abnormal lymph nodes being detected.  She underwent an immediate ultrasound-guided biopsy demonstrated invasive ductal carcinoma, grade 3, at least 1.7 cm in the core, ER +95%, IA negative, Ki-67 30%, HER2 2+ IHC FISH negative.  She saw Dr. Donaldson in consultation and is planning to undergo partial mastectomy and sentinel lymph node biopsy.     Her history is notable for post radial syndrome with motor nerve impairment which is gotten gradually worse over the last couple of years.  She now has to use a scooter almost all the time and has started having impairment in activities of daily living.  Otherwise she has no chronic medical problems.  She has a maternal aunt who had breast cancer.  No other breast or ovarian cancer is noted.     Interval histories:  Interval history 4/20/2023: She underwent left partial mastectomy and sentinel lymph node biopsy on 4/4/2023.  Pathology demonstrated invasive ductal carcinoma 3.5 cm in greatest dimension with focal high-grade DCIS.  Margins were clear and there was no lymph  vascular invasion.  1 sentinel lymph node was positive for micrometastases and 1 sentinel lymph node was negative.  Postoperative course was unremarkable but she does feel a firm mass at the incision site which is consistent with a seroma in the breast.  We sent an Oncotype DX which came back with recurrence score of 39.  She has no history of heart problems but does have history of hypertension.     Interval history 05/10/23 (CAlsop, APRN):  Patient seen today for her toxicity check following her first dose of chemotherapy employing AC.  Patient appeared to tolerate treatment very well.  She has not required any antinausea medications.  She had very mild underlying nausea but again no need for medication.  She does complain of constipation which is new with treatment.  She did have a bowel movement last night.  She was quite constipated after treatment and ended up taking milk of magnesia dose on Sunday night which caused diarrhea.  She also noticed a very mild elevation in temperature but that resolved.  She states her appetite has been good and she is eating well.  She has noticed decrease in energy this past week as well.     Interval history 05/24/23 (CAlsop, APRN):  Patient is apprehensive and scared about cycle 2 but overall she has been doing well.  She stated that her constipation is resolved and she is having normal bowel movements.  She did state that her hair started to fall out so she recently shaved it which she has been dealing with mentally.  Otherwise no other significant clinical complaints.  Derm at the incision site is still present but appears to be decreasing in size.  According to the patient she did reach out to Dr. Donaldson and was informed that there was no concerns at this time.     Interval history 06/14/23 (CAlsop, APRN):  Patient has done well.  She did have what she believed to be a urinary tract infection.  UA was negative for any bacteria but she did have positive leukocytes.  She was  quite symptomatic with pain, burning and pressure with urination, therefore I did treat her with a short course of antibiotics with Macrobid.  She did complete the antibiotics and as of last night her symptoms have completely resolved.  She denies any foul odor or vaginal discharge.  She denies any nausea or vomiting.  Her constipation is well controlled.  She did have a headache for just a few days after treatment but otherwise no other complaints.    Interval history 7/5/2023: She comes in for her fourth and final cycle of Adriamycin and cyclophosphamide.  Overall she has done well with it.  She has about a week of feeling Malays and puny after treatment.  She has minimal nausea and has not taken any antiemetics after her premeds.  She did get a headache again but it was better than the previous time.  She does not wish to change her antiemetic regimen.  She had symptoms of urinary tract infection but a negative UA after the last cycle again and this is likely mucosal irritation.  She will be seeing radiation next week.  She has generalized weakness but no new focal motor weakness.    Treatment history:  04/04/23: Left partial mastectomy and sentinel lymph node biopsy - Dr. Donaldson  05/03/23: C1D1 Adriamycin and Cyclophosphamide  05/24/23: C2D1 Adriamycin and Cyclophosphamide  06/14/23: C3D1 Adriamycin and Cyclophosphamide  7/5/2023: C4 D1 Adriamycin and cyclophosphamide      Allergies   Allergen Reactions    Penicillin G Hives     As a child    Tetracycline Vomiting     Current Outpatient Medications on File Prior to Encounter   Medication Sig Dispense Refill    lidocaine-prilocaine (EMLA) 2.5-2.5 % Cream Apply to port site 1 hour prior to port access as needed daily 30 g 3    ondansetron (ZOFRAN) 4 MG Tab tablet Take 1 Tablet by mouth every four hours as needed for Nausea/Vomiting (for nausea, vomiting). 30 Tablet 6    prochlorperazine (COMPAZINE) 10 MG Tab Take 1 Tablet by mouth every 6 hours as needed (for nausea,  "vomiting). 30 Tablet 6    Multiple Vitamins-Minerals (MULTIVITAMIN ADULT PO) Take  by mouth.      polyethylene glycol/lytes (MIRALAX) 17 g Pack Take 17 g by mouth every day. (Patient not taking: Reported on 7/5/2023)       No current facility-administered medications on file prior to encounter.       Review of Systems   Constitutional:  Negative for chills, fever and weight loss.   Respiratory:  Negative for cough and shortness of breath.    Cardiovascular:  Negative for chest pain and palpitations.   Gastrointestinal:  Negative for constipation, diarrhea, nausea and vomiting.   Genitourinary:  Negative for dysuria.   Musculoskeletal:  Negative for myalgias.   Neurological:  Positive for headaches (slightly for a few days after chemo and then resolves). Negative for dizziness and tingling.              Objective     /70   Pulse 98   Temp 37.1 °C (98.7 °F) (Temporal)   Resp 16   Ht 1.626 m (5' 4\")   Wt 103 kg (226 lb 11.9 oz)   SpO2 94%   BMI 38.92 kg/m²      Physical Exam  Vitals reviewed.   Constitutional:       General: She is not in acute distress.     Appearance: Normal appearance. She is not diaphoretic.   HENT:      Head: Normocephalic and atraumatic.   Cardiovascular:      Rate and Rhythm: Normal rate and regular rhythm.      Heart sounds: Normal heart sounds. No murmur heard.     No friction rub. No gallop.   Pulmonary:      Effort: Pulmonary effort is normal. No respiratory distress.      Breath sounds: Normal breath sounds. No wheezing.   Abdominal:      General: Bowel sounds are normal. There is no distension.      Palpations: Abdomen is soft.      Tenderness: There is no abdominal tenderness.   Musculoskeletal:      Comments: Confined to a wheel chair   Skin:     General: Skin is warm and dry.   Neurological:      Mental Status: She is alert and oriented to person, place, and time.   Psychiatric:         Mood and Affect: Mood normal.         Behavior: Behavior normal.               Latest " Reference Range & Units 06/13/23 16:08   WBC 4.8 - 10.8 K/uL 4.8   RBC 4.20 - 5.40 M/uL 4.34   Hemoglobin 12.0 - 16.0 g/dL 12.2   Hematocrit 37.0 - 47.0 % 37.9   MCV 81.4 - 97.8 fL 87.3   MCH 27.0 - 33.0 pg 28.1   MCHC 32.2 - 35.5 g/dL 32.2   RDW 35.9 - 50.0 fL 44.3   Platelet Count 164 - 446 K/uL 371   MPV 9.0 - 12.9 fL 9.5   Neutrophils-Polys 44.00 - 72.00 % 57.70   Neutrophils (Absolute) 1.82 - 7.42 K/uL 2.78   Lymphocytes 22.00 - 41.00 % 19.00 (L)   Lymphs (Absolute) 1.00 - 4.80 K/uL 0.92 (L)   Monocytes 0.00 - 13.40 % 19.00 (H)   Monos (Absolute) 0.00 - 0.85 K/uL 0.92 (H)   Eosinophils 0.00 - 6.90 % 0.60   Eos (Absolute) 0.00 - 0.51 K/uL 0.03   Basophils 0.00 - 1.80 % 1.00   Baso (Absolute) 0.00 - 0.12 K/uL 0.05   Immature Granulocytes 0.00 - 0.90 % 2.70 (H)   Immature Granulocytes (abs) 0.00 - 0.11 K/uL 0.13 (H)   Nucleated RBC 0.00 - 0.20 /100 WBC 0.00   NRBC (Absolute) K/uL 0.00   Outpt Infus CBC Comment  see below   Sodium 135 - 145 mmol/L 143   Potassium 3.6 - 5.5 mmol/L 4.0   Chloride 96 - 112 mmol/L 106   Co2 20 - 33 mmol/L 24   Anion Gap 7.0 - 16.0  13.0   Glucose 65 - 99 mg/dL 123 (H)   Bun 8 - 22 mg/dL 10   Creatinine 0.50 - 1.40 mg/dL 0.56   GFR (CKD-EPI) >60 mL/min/1.73 m 2 98   Calcium 8.5 - 10.5 mg/dL 9.2   Correct Calcium 8.5 - 10.5 mg/dL 9.3   AST(SGOT) 12 - 45 U/L 27   ALT(SGPT) 2 - 50 U/L 22   Alkaline Phosphatase 30 - 99 U/L 76   Total Bilirubin 0.1 - 1.5 mg/dL 0.2   Albumin 3.2 - 4.9 g/dL 3.9   Total Protein 6.0 - 8.2 g/dL 6.3   Globulin 1.9 - 3.5 g/dL 2.4   A-G Ratio g/dL 1.6              Assessment & Plan        Impression:  1.  Invasive ductal carcinoma of the left breast, grade 3, clinically stage IIa (cT2, cN0) ER +95%, CO negative, HER2 IHC 2+ FISH negative, Ki-67 30%.  Status post left partial mastectomy and sentinel lymph node biopsy for invasive ductal carcinoma, grade 3, stage IIb (pT2, PN 1 LEXI).  Oncotype DX recurrence score 39.  She is a good candidate for adjuvant chemotherapy.   Due to her postpolio syndrome would avoid taxanes and patient is receiving AC x4 adjuvant chemo therapy  2.  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  Stable.  3.  Constipation improved  4.  Headaches after chemotherapy resolved    Plan:  Complete AC chemotherapy with same antiemetic regimen today.  We will see her back in 4 weeks for routine follow-up.    Please note that this dictation was created using voice recognition software. I have made every reasonable attempt to correct obvious errors, but I expect that there are errors of grammar and possibly content that I did not discover before finalizing the note.

## 2023-07-06 ENCOUNTER — HOSPITAL ENCOUNTER (OUTPATIENT)
Dept: RADIATION ONCOLOGY | Facility: MEDICAL CENTER | Age: 73
End: 2023-07-31
Attending: RADIOLOGY
Payer: MEDICARE

## 2023-07-06 VITALS
HEIGHT: 62 IN | BODY MASS INDEX: 42.6 KG/M2 | HEART RATE: 100 BPM | DIASTOLIC BLOOD PRESSURE: 67 MMHG | SYSTOLIC BLOOD PRESSURE: 143 MMHG | WEIGHT: 231.48 LBS | OXYGEN SATURATION: 93 %

## 2023-07-06 DIAGNOSIS — C50.212 MALIGNANT NEOPLASM OF UPPER-INNER QUADRANT OF LEFT BREAST IN FEMALE, ESTROGEN RECEPTOR POSITIVE (HCC): ICD-10-CM

## 2023-07-06 DIAGNOSIS — Z17.0 MALIGNANT NEOPLASM OF UPPER-INNER QUADRANT OF LEFT BREAST IN FEMALE, ESTROGEN RECEPTOR POSITIVE (HCC): ICD-10-CM

## 2023-07-06 PROCEDURE — 99214 OFFICE O/P EST MOD 30 MIN: CPT | Performed by: RADIOLOGY

## 2023-07-06 PROCEDURE — 99205 OFFICE O/P NEW HI 60 MIN: CPT | Performed by: RADIOLOGY

## 2023-07-06 ASSESSMENT — PAIN SCALES - GENERAL: PAINLEVEL: NO PAIN

## 2023-07-06 ASSESSMENT — FIBROSIS 4 INDEX: FIB4 SCORE: 0.87

## 2023-07-06 NOTE — CONSULTS
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      HISTORY OF PRESENT ILLNESS: I had the pleasure of seeing Ms. Palmer today in consultation at the request of Dr. Donaldson for her breast cancer.  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  She recognized a mass in her left breast which led to a diagnostic mammogram.  This showed a roughly 3 cm spiculated mass with architectural distortion in the left upper outer quadrant of her breast.  Ultrasound showed a hypoechoic lesion measuring 2.7 cm in this location.  Biopsy of this area showed an invasive ductal carcinoma with grade 3 features.  Tumor was ER positive NM negative HER2/tae negative with a Ki-67 of 30%.  Patient elected to proceed with lumpectomy and sentinel lymph node biopsy on 4/4/2023.  This confirmed a 3.5 cm invasive ductal carcinoma.  All surgical margins were negative.  She had 1 lymph node positive for microscopic disease and 1 lymph node which was negative.  She proceeded with AC x4.  Her final chemotherapy was this week.  She has tolerated it well.  She did not receive taxane based therapy due to her polio syndrome.  She now presents to me to further discuss the role of radiation a part of her breast conservation strategy.    PAST MEDICAL HISTORY:   Past Medical History:   Diagnosis Date    Allergy     Anesthesia MAY 2010    NAUSEA AND VOMITTING AFTER COLONOSCOPY    Cancer (HCC) MARCH 2023 APRIL 04, 2023 SURGERY TO REMOVE LUMP IN LEFT BREAST    Chronic venous insufficiency     Malignant neoplasm of upper-inner quadrant of left female breast (HCC)     Polio osteopathy of lower leg (HCC)      PONV (postoperative nausea and vomiting) May 2010    NAUSEA AND VOMITTING AFTER COLONOSCOPY       PAST SURGICAL HISTORY:  Past Surgical History:   Procedure Laterality Date    PB MASTECTOMY, PARTIAL Left 2023    Procedure: LEFT PARTIAL MASTECTOMY, LEFT SENTINEL LYMPH NODE INJECTION WITH EXCISION OF AXILLARY NODES;  Surgeon: Lubna Donaldson M.D.;  Location: SURGERY SAME DAY [facility];  Service: General    NODE BIOPSY SENTINEL Left 2023    Procedure: BIOPSY, LYMPH NODE, SENTINEL;  Surgeon: Lubna Donaldson M.D.;  Location: SURGERY SAME DAY [facility];  Service: General    ORIF, ELBOW Right 2008    OTHER ORTHOPEDIC SURGERY  MAY 2010    FRACTURED RIGHT ELBOW    PRIMARY C SECTION         GYNECOLOGICAL STATUS:  : 1, Para: 1, and Age at first birth: 28    HORMONE USE:  Post-menopause use 0 years    CURRENT MEDICATIONS:  Current Outpatient Medications   Medication Sig Dispense Refill    lidocaine-prilocaine (EMLA) 2.5-2.5 % Cream Apply to port site 1 hour prior to port access as needed daily 30 g 3    ondansetron (ZOFRAN) 4 MG Tab tablet Take 1 Tablet by mouth every four hours as needed for Nausea/Vomiting (for nausea, vomiting). 30 Tablet 6    prochlorperazine (COMPAZINE) 10 MG Tab Take 1 Tablet by mouth every 6 hours as needed (for nausea, vomiting). 30 Tablet 6    Multiple Vitamins-Minerals (MULTIVITAMIN ADULT PO) Take  by mouth.      polyethylene glycol/lytes (MIRALAX) 17 g Pack Take 17 g by mouth every day. (Patient not taking: Reported on 2023)       No current facility-administered medications for this encounter.       ALLERGIES:    Penicillin g and Tetracycline    FAMILY HISTORY:    Family History   Problem Relation Age of Onset    Kidney Disease Father          93    No Known Problems Brother     Breast Cancer Maternal Aunt     Cancer Maternal Aunt         PASSED AWAY FROM BREAST CANCER    Cancer Maternal Aunt         BREAST CANCER - IN REMISSION    Breast Cancer Maternal Aunt      "Clotting Disorder Neg Hx     Stroke Neg Hx     Heart Attack Neg Hx     Tubal Cancer Neg Hx     Ovarian Cancer Neg Hx     Peritoneal Cancer Neg Hx     Colorectal Cancer Neg Hx     Diabetes Neg Hx     Heart Disease Neg Hx     Hypertension Neg Hx     Hyperlipidemia Neg Hx        SOCIAL HISTORY:    Social History     Tobacco Use    Smoking status: Never    Smokeless tobacco: Never    Tobacco comments:     Never smoked tobacco   Vaping Use    Vaping Use: Never used   Substance Use Topics    Alcohol use: No    Drug use: No     Patient is retired from [employer].  Lives with: Spouse    REVIEW OF SYSTEMS:  A complete review of systems was completed in patient's chart on 2023.  All are negative with relationship to this diagnosis with the exception of:  Patient is healing well from surgery, does not have any suspicious lesions in the breast or axilla, denies any acute areas of bony tenderness or deformity, denies any new headaches or neurologic symptoms     PHYSICAL EXAM:    Vitals:    23 1323   BP: (!) 143/67   BP Location: Right arm   Patient Position: Sitting   BP Cuff Size: Adult long   Pulse: 100   SpO2: 93%   Weight: 105 kg (231 lb 7.7 oz)   Height: 1.575 m (5' 2\")   Pain Score: No pain        ECO= Restricted in physically strenuous activity, but ambulatory and able to carry out work of a light sedentary nature, e.g., light housework, office work.    PAIN:  Patient any acute/ chronic pain    GENERAL: No apparent distress.  HEENT:  Pupils are equal, round, and reactive to light.  Extraocular muscles   are intact. Sclerae nonicteric.  Conjunctivae pink.  Oral cavity, tongue   protrudes midline.   NECK:  Supple without evidence of thyromegaly.  NODES:  No peripheral adenopathy of the neck, supraclavicular fossa or axillae   bilaterally.  LUNGS:  Clear to ascultation bilaterally   HEART:  Regular rate and rhythm.  No murmur appreciated  BREAST: No suspicious lesions found in either breast or " axilla.  ABDOMEN:  Soft. No evidence of hepatosplenomegaly.  Positive bowel sounds.  EXTREMITIES:  Without Edema.  NEUROLOGIC:  Cranial nerves II through XII were intact. Normal stance and gait motor and sensory grossly within normal limits       IMPRESSION:    Stage IIB (C3V9lG4) G3 invasive ductal carcinoma of the left upper outer quadrant of breast ER positive LA negative HER2/tae negative with a Ki-67 of 30% Oncotype score 39 status post lumpectomy and sentinel lymph node biopsy on 4/4/2023, AC x4, planning on an aromatase inhibitor.      RECOMMENDATIONS:    I discussed the diagnosis, prognosis, and treatment options over a 1 hr 5 min time period, 95% of that time dedicated to ongoing treatment management.  I discussed with the patient and her  the variables important for her staging.  We went over the significance of Oncotype score towards systemic chemotherapy.  Next we discussed the role of an aromatase inhibitor.  We reviewed the data for mastectomy versus lumpectomy and radiation, we discussed lumpectomy with and without radiation.  I integrated in the significance of lymph node positive disease.  With only a solitary micrometastatic lymph node she would be a good candidate for a high tangent.  This would minimize the risk of lymphedema or other changes as she has left side dependent.  I anticipate hypofractionated radiation without a boost.  I have given her a simulation for July 24 at 1 PM.    We discussed the risks, benefits and side effects of treatment and the patient is amenable to treatment.  If patient has any questions or concerns, she should feel free to contact me.    Thank you for the opportunity to participate in her care.  If any questions or comments, please do not hesitate in calling.

## 2023-07-06 NOTE — PROGRESS NOTES
Donna [facility] into [facility] for Day [facility]/ Cycle 4 of Doxorubicin for breast cancer. Pt denied having any new or acute complaints today, reports tolerating past treatments well. Port accessed in a sterile manner, had + blood return, flushed briskly. Labs drawn the day prior to appointment and were within parameters. Pt given pre-meds and chemo as prescribed, tolerated well, denied having any complaints during or after infusion. Port had + blood return after, flushed per [facility] policy, de-accessed, needle intact, insertion site covered with sterile gauze and paper tape. Pt's last treatment today. Discharged to self care.

## 2023-07-06 NOTE — CT SIMULATION
PATIENT NAME Donna Palmer   PRIMARY PHYSICIAN Lydia Renner [id number]   REFERRING PHYSICIAN Lubna Donaldson M.D. 1950     Malignant neoplasm of upper-inner quadrant of left breast in female, estrogen receptor positive (HCC)  Staging form: Breast, AJCC 8th Edition  - Clinical stage from 3/6/2023: Stage IIB (cT2, cN0, cM0, G3, ER+, NH-, HER2-) - Signed by Ludin Zaman M.D. on 3/31/2023  Histologic grading system: 3 grade system  - Pathologic stage from 6/22/2023: Stage IIB (pT2, pN1mi, cM0, G3, ER+, NH-, HER2-, Oncotype DX score: 39) - Signed by Can Bautista M.D. on 6/22/2023  Stage prefix: Initial diagnosis  Multigene prognostic tests performed: Oncotype DX  Recurrence score range: Greater than or equal to 11  Histologic grading system: 3 grade system         Treatment Planning CT Simulation        Order Questions       Question Answer Comment    Is this for a new course of treatment? Yes     Is this an Addendum? No     Implanted Device/Pacemaker No     Simulation Status Initial     Treatment Site Breast     Laterality Left     Treatment Technique 3D CRT     Other Technique(s) 3D     Treatment Pattern/Frequency Daily     Concurrent Chemotherapy No     CT Technique 3D DIBH possible     DIBH     Slice Thickness 3mm     Scan Extent Chest     Bowel Preparation No     Treatment Device(s) Vac Aurelio      Wing Board     Treatment Marker Scar     Patient Attire Gown     Patient Position Supine     Patient Orientation Head First     Arm Position Up     Treatment Machine No preference     Treatment Image Guidance Ports     Frequency (ports) Weekly     Other Orders Weekly Physics Check due to chemo     Special Tx Procedure                   Comments       15 fx hi agosto no boost

## 2023-07-06 NOTE — PROGRESS NOTES
"Patient was seen today in clinic with Dr. Bautista for consultation.  Vitals signs and weight were obtained and pain assessment was completed.  Allergies and medications were reviewed with the patient.      Vitals/Pain:  Vitals:    07/06/23 1323   BP: (!) 143/67   BP Location: Right arm   Patient Position: Sitting   BP Cuff Size: Adult long   Pulse: 100   SpO2: 93%   Weight: 105 kg (231 lb 7.7 oz)   Height: 1.575 m (5' 2\")   Pain Score: No pain        Allergies:   Penicillin g and Tetracycline    Current Medications:  Current Outpatient Medications   Medication Sig Dispense Refill    lidocaine-prilocaine (EMLA) 2.5-2.5 % Cream Apply to port site 1 hour prior to port access as needed daily 30 g 3    ondansetron (ZOFRAN) 4 MG Tab tablet Take 1 Tablet by mouth every four hours as needed for Nausea/Vomiting (for nausea, vomiting). 30 Tablet 6    prochlorperazine (COMPAZINE) 10 MG Tab Take 1 Tablet by mouth every 6 hours as needed (for nausea, vomiting). 30 Tablet 6    Multiple Vitamins-Minerals (MULTIVITAMIN ADULT PO) Take  by mouth.      polyethylene glycol/lytes (MIRALAX) 17 g Pack Take 17 g by mouth every day. (Patient not taking: Reported on 7/5/2023)       No current facility-administered medications for this encounter.         PCP:  Jud Clinton R.N.   "

## 2023-07-24 ENCOUNTER — APPOINTMENT (OUTPATIENT)
Dept: RADIATION ONCOLOGY | Facility: MEDICAL CENTER | Age: 73
End: 2023-07-24
Payer: MEDICARE

## 2023-07-24 PROCEDURE — 77290 THER RAD SIMULAJ FIELD CPLX: CPT | Performed by: RADIOLOGY

## 2023-07-24 PROCEDURE — 77263 THER RADIOLOGY TX PLNG CPLX: CPT | Performed by: RADIOLOGY

## 2023-07-24 PROCEDURE — 77290 THER RAD SIMULAJ FIELD CPLX: CPT | Mod: 26 | Performed by: RADIOLOGY

## 2023-07-24 PROCEDURE — 77334 RADIATION TREATMENT AID(S): CPT | Performed by: RADIOLOGY

## 2023-07-24 PROCEDURE — 77334 RADIATION TREATMENT AID(S): CPT | Mod: 26 | Performed by: RADIOLOGY

## 2023-07-24 PROCEDURE — 77470 SPECIAL RADIATION TREATMENT: CPT | Mod: 26 | Performed by: RADIOLOGY

## 2023-07-24 PROCEDURE — 77470 SPECIAL RADIATION TREATMENT: CPT | Performed by: RADIOLOGY

## 2023-07-24 RX ORDER — 0.9 % SODIUM CHLORIDE 0.9 %
20 VIAL (ML) INJECTION PRN
Status: CANCELLED | OUTPATIENT
Start: 2023-08-02

## 2023-07-27 PROCEDURE — 77295 3-D RADIOTHERAPY PLAN: CPT | Performed by: RADIOLOGY

## 2023-07-27 PROCEDURE — 77300 RADIATION THERAPY DOSE PLAN: CPT | Mod: 26 | Performed by: RADIOLOGY

## 2023-07-27 PROCEDURE — 77334 RADIATION TREATMENT AID(S): CPT | Performed by: RADIOLOGY

## 2023-07-27 PROCEDURE — 77300 RADIATION THERAPY DOSE PLAN: CPT | Performed by: RADIOLOGY

## 2023-07-27 PROCEDURE — 77334 RADIATION TREATMENT AID(S): CPT | Mod: 26 | Performed by: RADIOLOGY

## 2023-07-27 PROCEDURE — 77295 3-D RADIOTHERAPY PLAN: CPT | Mod: 26 | Performed by: RADIOLOGY

## 2023-07-31 ENCOUNTER — APPOINTMENT (OUTPATIENT)
Dept: RADIATION ONCOLOGY | Facility: MEDICAL CENTER | Age: 73
End: 2023-07-31
Payer: MEDICARE

## 2023-07-31 PROCEDURE — 77280 THER RAD SIMULAJ FIELD SMPL: CPT | Performed by: RADIOLOGY

## 2023-07-31 PROCEDURE — 77280 THER RAD SIMULAJ FIELD SMPL: CPT | Mod: 26 | Performed by: RADIOLOGY

## 2023-07-31 PROCEDURE — 77412 RADIATION TX DELIVERY LVL 3: CPT | Performed by: RADIOLOGY

## 2023-08-01 ENCOUNTER — APPOINTMENT (OUTPATIENT)
Dept: RADIATION ONCOLOGY | Facility: MEDICAL CENTER | Age: 73
End: 2023-08-01
Payer: MEDICARE

## 2023-08-01 ENCOUNTER — HOSPITAL ENCOUNTER (OUTPATIENT)
Dept: RADIATION ONCOLOGY | Facility: MEDICAL CENTER | Age: 73
End: 2023-08-31
Attending: RADIOLOGY
Payer: MEDICARE

## 2023-08-01 LAB
CHEMOTHERAPY INFUSION START DATE: NORMAL
CHEMOTHERAPY RECORDS: 2.67
CHEMOTHERAPY RECORDS: 4005
CHEMOTHERAPY RECORDS: NORMAL
CHEMOTHERAPY RX CANCER: NORMAL
DATE 1ST CHEMO CANCER: NORMAL
RAD ONC ARIA COURSE LAST TREATMENT DATE: NORMAL
RAD ONC ARIA COURSE TREATMENT ELAPSED DAYS: NORMAL
RAD ONC ARIA REFERENCE POINT DOSAGE GIVEN TO DATE: 5.34
RAD ONC ARIA REFERENCE POINT DOSAGE GIVEN TO DATE: 5.34
RAD ONC ARIA REFERENCE POINT ID: NORMAL
RAD ONC ARIA REFERENCE POINT ID: NORMAL
RAD ONC ARIA REFERENCE POINT SESSION DOSAGE GIVEN: 2.67
RAD ONC ARIA REFERENCE POINT SESSION DOSAGE GIVEN: 2.67

## 2023-08-01 PROCEDURE — 77417 THER RADIOLOGY PORT IMAGE(S): CPT | Performed by: RADIOLOGY

## 2023-08-01 PROCEDURE — 77412 RADIATION TX DELIVERY LVL 3: CPT | Performed by: RADIOLOGY

## 2023-08-02 ENCOUNTER — OUTPATIENT INFUSION SERVICES (OUTPATIENT)
Dept: ONCOLOGY | Facility: MEDICAL CENTER | Age: 73
End: 2023-08-02
Attending: INTERNAL MEDICINE
Payer: MEDICARE

## 2023-08-02 VITALS
DIASTOLIC BLOOD PRESSURE: 82 MMHG | TEMPERATURE: 98.4 F | SYSTOLIC BLOOD PRESSURE: 143 MMHG | OXYGEN SATURATION: 94 % | HEART RATE: 58 BPM | RESPIRATION RATE: 18 BRPM

## 2023-08-02 VITALS
DIASTOLIC BLOOD PRESSURE: 86 MMHG | HEART RATE: 98 BPM | SYSTOLIC BLOOD PRESSURE: 145 MMHG | OXYGEN SATURATION: 95 % | WEIGHT: 224.43 LBS | BODY MASS INDEX: 41.05 KG/M2

## 2023-08-02 DIAGNOSIS — Z17.0 MALIGNANT NEOPLASM OF UPPER-INNER QUADRANT OF LEFT BREAST IN FEMALE, ESTROGEN RECEPTOR POSITIVE (HCC): ICD-10-CM

## 2023-08-02 DIAGNOSIS — C50.212 MALIGNANT NEOPLASM OF UPPER-INNER QUADRANT OF LEFT BREAST IN FEMALE, ESTROGEN RECEPTOR POSITIVE (HCC): ICD-10-CM

## 2023-08-02 LAB
ALBUMIN SERPL BCP-MCNC: 3.9 G/DL (ref 3.2–4.9)
ALBUMIN/GLOB SERPL: 1.4 G/DL
ALP SERPL-CCNC: 74 U/L (ref 30–99)
ALT SERPL-CCNC: 25 U/L (ref 2–50)
ANION GAP SERPL CALC-SCNC: 12 MMOL/L (ref 7–16)
AST SERPL-CCNC: 26 U/L (ref 12–45)
BASOPHILS # BLD AUTO: 0.7 % (ref 0–1.8)
BASOPHILS # BLD: 0.04 K/UL (ref 0–0.12)
BILIRUB SERPL-MCNC: 0.3 MG/DL (ref 0.1–1.5)
BUN SERPL-MCNC: 13 MG/DL (ref 8–22)
CALCIUM ALBUM COR SERPL-MCNC: 9.5 MG/DL (ref 8.5–10.5)
CALCIUM SERPL-MCNC: 9.4 MG/DL (ref 8.5–10.5)
CHEMOTHERAPY INFUSION START DATE: NORMAL
CHEMOTHERAPY RECORDS: 2.67
CHEMOTHERAPY RECORDS: 4005
CHEMOTHERAPY RECORDS: NORMAL
CHEMOTHERAPY RX CANCER: NORMAL
CHLORIDE SERPL-SCNC: 105 MMOL/L (ref 96–112)
CO2 SERPL-SCNC: 24 MMOL/L (ref 20–33)
CREAT SERPL-MCNC: 0.64 MG/DL (ref 0.5–1.4)
DATE 1ST CHEMO CANCER: NORMAL
EOSINOPHIL # BLD AUTO: 0.14 K/UL (ref 0–0.51)
EOSINOPHIL NFR BLD: 2.4 % (ref 0–6.9)
ERYTHROCYTE [DISTWIDTH] IN BLOOD BY AUTOMATED COUNT: 53.8 FL (ref 35.9–50)
GFR SERPLBLD CREATININE-BSD FMLA CKD-EPI: 95 ML/MIN/1.73 M 2
GLOBULIN SER CALC-MCNC: 2.7 G/DL (ref 1.9–3.5)
GLUCOSE SERPL-MCNC: 118 MG/DL (ref 65–99)
HCT VFR BLD AUTO: 35.2 % (ref 37–47)
HGB BLD-MCNC: 11.3 G/DL (ref 12–16)
IMM GRANULOCYTES # BLD AUTO: 0.03 K/UL (ref 0–0.11)
IMM GRANULOCYTES NFR BLD AUTO: 0.5 % (ref 0–0.9)
LYMPHOCYTES # BLD AUTO: 0.73 K/UL (ref 1–4.8)
LYMPHOCYTES NFR BLD: 12.7 % (ref 22–41)
MCH RBC QN AUTO: 28.8 PG (ref 27–33)
MCHC RBC AUTO-ENTMCNC: 32.1 G/DL (ref 32.2–35.5)
MCV RBC AUTO: 89.6 FL (ref 81.4–97.8)
MONOCYTES # BLD AUTO: 0.62 K/UL (ref 0–0.85)
MONOCYTES NFR BLD AUTO: 10.8 % (ref 0–13.4)
NEUTROPHILS # BLD AUTO: 4.17 K/UL (ref 1.82–7.42)
NEUTROPHILS NFR BLD: 72.9 % (ref 44–72)
NRBC # BLD AUTO: 0 K/UL
NRBC BLD-RTO: 0 /100 WBC (ref 0–0.2)
PLATELET # BLD AUTO: 246 K/UL (ref 164–446)
PMV BLD AUTO: 9.9 FL (ref 9–12.9)
POTASSIUM SERPL-SCNC: 3.8 MMOL/L (ref 3.6–5.5)
PROT SERPL-MCNC: 6.6 G/DL (ref 6–8.2)
RAD ONC ARIA COURSE LAST TREATMENT DATE: NORMAL
RAD ONC ARIA COURSE TREATMENT ELAPSED DAYS: NORMAL
RAD ONC ARIA REFERENCE POINT DOSAGE GIVEN TO DATE: 8.01
RAD ONC ARIA REFERENCE POINT DOSAGE GIVEN TO DATE: 8.01
RAD ONC ARIA REFERENCE POINT ID: NORMAL
RAD ONC ARIA REFERENCE POINT ID: NORMAL
RAD ONC ARIA REFERENCE POINT SESSION DOSAGE GIVEN: 2.67
RAD ONC ARIA REFERENCE POINT SESSION DOSAGE GIVEN: 2.67
RBC # BLD AUTO: 3.93 M/UL (ref 4.2–5.4)
SODIUM SERPL-SCNC: 141 MMOL/L (ref 135–145)
WBC # BLD AUTO: 5.7 K/UL (ref 4.8–10.8)

## 2023-08-02 PROCEDURE — A4212 NON CORING NEEDLE OR STYLET: HCPCS

## 2023-08-02 PROCEDURE — 700111 HCHG RX REV CODE 636 W/ 250 OVERRIDE (IP): Performed by: NURSE PRACTITIONER

## 2023-08-02 PROCEDURE — 85025 COMPLETE CBC W/AUTO DIFF WBC: CPT

## 2023-08-02 PROCEDURE — 77412 RADIATION TX DELIVERY LVL 3: CPT | Performed by: RADIOLOGY

## 2023-08-02 PROCEDURE — 96523 IRRIG DRUG DELIVERY DEVICE: CPT

## 2023-08-02 PROCEDURE — 80053 COMPREHEN METABOLIC PANEL: CPT

## 2023-08-02 PROCEDURE — 77336 RADIATION PHYSICS CONSULT: CPT | Performed by: RADIOLOGY

## 2023-08-02 RX ADMIN — HEPARIN 500 UNITS: 100 SYRINGE at 11:08

## 2023-08-02 ASSESSMENT — PAIN SCALES - GENERAL: PAINLEVEL: NO PAIN

## 2023-08-02 ASSESSMENT — FIBROSIS 4 INDEX: FIB4 SCORE: 1.48

## 2023-08-02 NOTE — PROGRESS NOTES
Donna arrives to [facility] for monthly port flush.  She voices no complaints.  Right upper chest port accessed in sterile fashion, flushes easily, brisk blood return observed.  Labs collected per MD order.  Port flushed with NS, heparin instilled.  Port de-accessed, gauze and tape to site.  Discharged in NAD, next appointment confirmed.

## 2023-08-02 NOTE — ON TREATMENT VISIT
ON TREATMENT NOTE  RADIATION ONCOLOGY DEPARTMENT    Patient name:  Donna Palmer    Primary Physician:  AUGUSTA Gallego MRN: [id number]  [facility]: [id number]   Referring physician:  Lubna Donaldson M.D. : 1950, 72 y.o.     ENCOUNTER DATE:  23    DIAGNOSIS:    Malignant neoplasm of upper-inner quadrant of left breast in female, estrogen receptor positive (HCC)  Staging form: Breast, AJCC 8th Edition  - Clinical stage from 3/6/2023: Stage IIB (cT2, cN0, cM0, G3, ER+, ID-, HER2-) - Signed by Ludin Zaman M.D. on 3/31/2023  Histologic grading system: 3 grade system  - Pathologic stage from 2023: Stage IIB (pT2, pN1mi, cM0, G3, ER+, ID-, HER2-, Oncotype DX score: 39) - Signed by Can Bautista M.D. on 2023  Stage prefix: Initial diagnosis  Multigene prognostic tests performed: Oncotype DX  Recurrence score range: Greater than or equal to 11  Histologic grading system: 3 grade system      TREATMENT SUMMARY:  Aria Treatment Information          2023   Aria Course Treatment Dates   Course First Treatment Date 2023    Course Last Treatment Date 2023    Aria Treatment Summary   L_Breast_HT  Plan from Course [id number]_[id number]   Fraction 3 of 15   Elapsed Course Days 2 @ [id number]   Prescribed Fraction Dose 267 cGy   Prescribed Total Dose 4,005 cGy   L_Breast HT CP  Reference Point from Course [id number]_[id number]   Elapsed Course Days 2 @ [id number]   Session Dose 267 cGy   Total Dose 801 cGy   L_Breast_HT  Reference Point from Course [id number]_[id number]   Elapsed Course Days 2 @ [id number]   Session Dose 267 cGy   Total Dose 801 cGy      Radiation Treatments       Active   Plans   L_Breast_HT   Most recent treatment: Dose planned: 267 cGy (fraction 3 of 15 on 2023)   Total: Dose planned: 4,005 cGy   Elapsed Days: 2 @            Historical   No historical radiation treatments to show.               SUBJECTIVE:   Patient is doing well.  She does not have  any changes related to her radiation.    VITAL SIGNS:    Encounter Vitals  Blood Pressure : (!) 145/86  Pulse: 98  Pulse Oximetry: 95 %  Weight: 102 kg (224 lb 6.9 oz)  Pain Score: No pain      8/2/2023     2:00 PM 7/6/2023     1:23 PM 5/10/2023    10:20 AM 4/20/2023     2:10 PM 3/31/2023     2:30 PM   Pain Assessment   Pain Score NO PAIN NO PAIN NO PAIN NO PAIN NO PAIN          PHYSICAL EXAM:  No erythema    TOXICITY      8/2/2023     2:02 PM   Toxicity Assessment   Toxicity Assessment Breast   Fatigue (lethargy, malaise, asthenia) None   Fever (in the absence of neutropenia) None   Radiation Dermatitis None   Lymphatics Normal   RT - Pain due to RT None   Dyspnea Normal         IMPRESSION:  Cancer Staging   Malignant neoplasm of upper-inner quadrant of left breast in female, estrogen receptor positive (HCC)  Staging form: Breast, AJCC 8th Edition  - Clinical stage from 3/6/2023: Stage IIB (cT2, cN0, cM0, G3, ER+, HI-, HER2-) - Signed by Ludin Zaman M.D. on 3/31/2023  - Pathologic stage from 6/22/2023: Stage IIB (pT2, pN1mi, cM0, G3, ER+, HI-, HER2-, Oncotype DX score: 39) - Signed by Can Bautista M.D. on 6/22/2023      PLAN:  No change in treatment plan    Disposition:  Treatment plan reviewed. Questions answered. Continue therapy outlined.     Can Bautista M.D.    Orders Placed This Encounter    Rad Onc Aria Session Summary

## 2023-08-04 ENCOUNTER — APPOINTMENT (OUTPATIENT)
Dept: RADIATION ONCOLOGY | Facility: MEDICAL CENTER | Age: 73
End: 2023-08-04
Payer: MEDICARE

## 2023-08-04 PROCEDURE — 77412 RADIATION TX DELIVERY LVL 3: CPT | Performed by: RADIOLOGY

## 2023-08-07 PROCEDURE — 77427 RADIATION TX MANAGEMENT X5: CPT | Performed by: RADIOLOGY

## 2023-08-08 ENCOUNTER — OFFICE VISIT (OUTPATIENT)
Dept: MEDICAL GROUP | Facility: PHYSICIAN GROUP | Age: 73
End: 2023-08-08
Payer: MEDICARE

## 2023-08-08 VITALS
WEIGHT: 224 LBS | OXYGEN SATURATION: 95 % | DIASTOLIC BLOOD PRESSURE: 80 MMHG | HEIGHT: 62 IN | BODY MASS INDEX: 41.22 KG/M2 | SYSTOLIC BLOOD PRESSURE: 116 MMHG | HEART RATE: 91 BPM | TEMPERATURE: 98.5 F

## 2023-08-08 DIAGNOSIS — H61.21 IMPACTED CERUMEN OF RIGHT EAR: ICD-10-CM

## 2023-08-08 DIAGNOSIS — R73.09 ELEVATED GLUCOSE LEVEL: ICD-10-CM

## 2023-08-08 DIAGNOSIS — E66.01 MORBID OBESITY WITH BMI OF 40.0-44.9, ADULT (HCC): ICD-10-CM

## 2023-08-08 PROCEDURE — 77417 THER RADIOLOGY PORT IMAGE(S): CPT | Performed by: RADIOLOGY

## 2023-08-08 PROCEDURE — 3074F SYST BP LT 130 MM HG: CPT

## 2023-08-08 PROCEDURE — G0438 PPPS, INITIAL VISIT: HCPCS

## 2023-08-08 PROCEDURE — 3079F DIAST BP 80-89 MM HG: CPT

## 2023-08-08 PROCEDURE — 77412 RADIATION TX DELIVERY LVL 3: CPT | Performed by: RADIOLOGY

## 2023-08-08 ASSESSMENT — PATIENT HEALTH QUESTIONNAIRE - PHQ9: CLINICAL INTERPRETATION OF PHQ2 SCORE: 0

## 2023-08-08 ASSESSMENT — FIBROSIS 4 INDEX: FIB4 SCORE: 1.48

## 2023-08-08 ASSESSMENT — ACTIVITIES OF DAILY LIVING (ADL): BATHING_REQUIRES_ASSISTANCE: 0

## 2023-08-08 ASSESSMENT — ENCOUNTER SYMPTOMS: GENERAL WELL-BEING: FAIR

## 2023-08-08 NOTE — PROGRESS NOTES
Chief Complaint   Patient presents with    Annual Exam       HPI:  Donna Palmer is a 72 y.o. here for Medicare Annual Wellness Visit     Patient Active Problem List    Diagnosis Date Noted    Morbid obesity with BMI of 40.0-44.9, adult (HCC) 08/08/2023    Malignant neoplasm of upper-inner quadrant of left breast in female, estrogen receptor positive (HCC) 03/31/2023    Mass of left breast 03/07/2023    Arrhythmia 02/06/2023    Primary hypertension 08/26/2022    Chronic venous insufficiency 11/23/2020    Postpoliomyelitis syndrome 11/23/2020    BMI 36.0-36.9,adult 11/23/2020    Localized edema 11/23/2020    Stasis dermatitis of both legs 10/07/2020    Annual physical exam 04/10/2013    History of poliomyelitis 04/10/2013       Current Outpatient Medications   Medication Sig Dispense Refill    lidocaine-prilocaine (EMLA) 2.5-2.5 % Cream Apply to port site 1 hour prior to port access as needed daily 30 g 3    Multiple Vitamins-Minerals (MULTIVITAMIN ADULT PO) Take  by mouth.       No current facility-administered medications for this visit.          Current supplements as per medication list.     Allergies: Penicillin g and Tetracycline    Current social contact/activities: Donna spends time with friends at [employer]. Texts with her friends and talks on the phone with them occasionally.     She  reports that she has never smoked. She has never used smokeless tobacco. She reports that she does not drink alcohol and does not use drugs.  Counseling given: Not Answered  Tobacco comments: Never smoked tobacco      ROS:    Gait: Uses a scooter.   Ostomy: No  Other tubes: No  Amputations: No  Chronic oxygen use: No  Last eye exam: 2 weeks ago.  Wears hearing aids: No   : Denies any urinary leakage during the last 6 months    Screening:  Depression Screening  Little interest or pleasure in doing things?  0 - not at all  Feeling down, depressed , or hopeless? 0 - not at all  Patient Health Questionnaire Score: 0     If  depressive symptoms identified deferred to follow up visit unless specifically addressed in assessment and plan.    Interpretation of PHQ-9 Total Score   Score Severity   1-4 No Depression   5-9 Mild Depression   10-14 Moderate Depression   15-19 Moderately Severe Depression   20-27 Severe Depression    Screening for Cognitive Impairment  Three Minute Recall (daughter, heaven, mountain) 3/3    Dov clock face with all 12 numbers and set the hands to show 10 past 11.  Yes    Cognitive concerns identified deferred for follow up unless specifically addressed in assessment and plan.    Fall Risk Assessment  Has the patient had two or more falls in the last year or any fall with injury in the last year?  No    Safety Assessment  Throw rugs on floor.  No  Handrails on all stairs.  Yes  Good lighting in all hallways.  Yes  Difficulty hearing.  No  Patient counseled about all safety risks that were identified.    Functional Assessment ADLs  Are there any barriers preventing you from cooking for yourself or meeting nutritional needs?  No.    Are there any barriers preventing you from driving safely or obtaining transportation?  Yes. Stopped driving last year post polio   Are there any barriers preventing you from using a telephone or calling for help?  No.    Are there any barriers preventing you from shopping?  No.    Are there any barriers preventing you from taking care of your own finances?  No.    Are there any barriers preventing you from managing your medications?  No.    Are there any barriers preventing you from showering, bathing or dressing yourself?  No.    Are you currently engaging in any exercise or physical activity?  Yes.  Donna walks around the house.  What is your perception of your health?  Fair    Advance Care Planning  Do you have an Advance Directive, Living Will, Durable Power of , or POLST? Yes  Advance Directive   Durable Power of    is on file      Health Maintenance Summary             Overdue - Annual Wellness Visit (Every 366 Days) Overdue - never done      No completion history exists for this topic.              Postponed - IMM PNEUMOCOCCAL VACCINE: 65+ Years (2 - PCV) Postponed until 2/6/2024 01/30/2006  Imm Admin: Pneumococcal polysaccharide vaccine (PPSV-23)              IMM DTaP/Tdap/Td Vaccine (2 - Td or Tdap) Next due on 4/27/2025 04/27/2015  Imm Admin: Tdap Vaccine              COLORECTAL CANCER SCREENING (COLOGUARD STOOL DNA - Every 3 Years) Next due on 3/2/2026      03/02/2023  COLOGUARD COLON CANCER SCREENING    03/13/2015  COLONOSCOPY (Done)              IMM HEP B VACCINE (Series Information) Completed      06/12/2006  Imm Admin: Hep A/HEP B Combined Vaccine (TwinRix)    01/09/2006  Imm Admin: Hepatitis B Vaccine (Adol/Adult)    12/06/2005  Imm Admin: Hep A/HEP B Combined Vaccine (TwinRix)              IMM ZOSTER VACCINES (Series Information) Completed      10/30/2022  Imm Admin: Zoster Vaccine Recombinant (RZV) (SHINGRIX)    08/09/2022  Imm Admin: Zoster Vaccine Recombinant (RZV) (SHINGRIX)    05/15/2013  Imm Admin: Zoster Vaccine Live (ZVL) (Zostavax) - HISTORICAL DATA              COVID-19 Vaccine (Series Information) Completed      11/19/2022  Imm Admin: PFIZER BIVALENT BOOSTER SARS-COV-2 VACCINE (12+)    10/14/2021  Imm Admin: PFIZER PURPLE CAP SARS-COV-2 VACCINATION (12+)    03/25/2021  Imm Admin: PFIZER PURPLE CAP SARS-COV-2 VACCINATION (12+)    02/26/2021  Imm Admin: PFIZER PURPLE CAP SARS-COV-2 VACCINATION (12+)              HPV Vaccines (Series Information) Aged Out      No completion history exists for this topic.              IMM MENINGOCOCCAL ACWY VACCINE (Series Information) Aged Out      No completion history exists for this topic.              Discontinued - MAMMOGRAM  Discontinued      03/06/2023  MA-DIAGNOSTIC MAMMO BILAT W/TOMOSYNTHESIS W/CAD    03/16/2017  Done    06/10/2015  MA-SCREENING MAMMOGRAM W/ CAD    05/22/2014  MA-SCREENING MAMMOGRAM W/  CAD    05/15/2013  MA-SCREENING MAMMOGRAM W/ CAD              Discontinued - BONE DENSITY  Discontinued      03/15/2013  Done              Discontinued - IMM INFLUENZA  Discontinued      No completion history exists for this topic.              Discontinued - HEPATITIS C SCREENING  Discontinued      No completion history exists for this topic.                    Patient Care Team:  AUGUSTA Gallego as PCP - General (Nurse Practitioner Family)  Sree Merlos M.D. (Family Medicine)  Ludin Zaman M.D. (Hematology & Oncology)  Erum Garrido R.N. as Nurse Navigator  Can Bautista M.D. (Radiation Oncology)        Social History     Tobacco Use    Smoking status: Never    Smokeless tobacco: Never    Tobacco comments:     Never smoked tobacco   Vaping Use    Vaping Use: Never used   Substance Use Topics    Alcohol use: No    Drug use: No     Family History   Problem Relation Age of Onset    Kidney Disease Father          93    No Known Problems Brother     Breast Cancer Maternal Aunt     Cancer Maternal Aunt         PASSED AWAY FROM BREAST CANCER    Cancer Maternal Aunt         BREAST CANCER - IN REMISSION    Breast Cancer Maternal Aunt     Clotting Disorder Neg Hx     Stroke Neg Hx     Heart Attack Neg Hx     Tubal Cancer Neg Hx     Ovarian Cancer Neg Hx     Peritoneal Cancer Neg Hx     Colorectal Cancer Neg Hx     Diabetes Neg Hx     Heart Disease Neg Hx     Hypertension Neg Hx     Hyperlipidemia Neg Hx      She  has a past medical history of Allergy, Anesthesia (MAY 2010), Cancer (HCC) (2023), Chronic venous insufficiency, Malignant neoplasm of upper-inner quadrant of left female breast (HCC), Polio osteopathy of lower leg (HCC) (), and PONV (postoperative nausea and vomiting) (May 2010).    She has no past medical history of Acute nasopharyngitis, Anginal syndrome (HCC), Arthritis, Asthma, Blood clotting disorder (HCC), Bowel habit changes, Breath shortness, Bronchitis,  "Carcinoma in situ of respiratory system, Cataract, Congestive heart failure (HCC), Continuous ambulatory peritoneal dialysis status (HCC), Coughing blood, Dental disorder, Diabetes (HCC), Dialysis patient (HCC), Disorder of thyroid, Emphysema of lung (HCC), Glaucoma, Gynecological disorder, Heart burn, Heart murmur, Heart valve disease, Hemorrhagic disorder (HCC), Hepatitis A, Hepatitis B, Hepatitis C, Hiatus hernia syndrome, High cholesterol, Hypertension, Indigestion, Infectious disease, Jaundice, Myocardial infarct (HCC), Pacemaker, Pain, Pneumonia, Pregnant, Psychiatric problem, Renal disorder, Rheumatic fever, Seizure (HCC), Sleep apnea, Snoring, Stroke (HCC), Tuberculosis, Urinary bladder disorder, or Urinary incontinence.   Past Surgical History:   Procedure Laterality Date    PB MASTECTOMY, PARTIAL Left 04/04/2023    Procedure: LEFT PARTIAL MASTECTOMY, LEFT SENTINEL LYMPH NODE INJECTION WITH EXCISION OF AXILLARY NODES;  Surgeon: Lubna Donaldson M.D.;  Location: SURGERY SAME DAY [facility];  Service: General    NODE BIOPSY SENTINEL Left 04/04/2023    Procedure: BIOPSY, LYMPH NODE, SENTINEL;  Surgeon: Lubna Donaldson M.D.;  Location: SURGERY SAME DAY [facility];  Service: General    ORIF, ELBOW Right 09/29/2008    LUMPECTOMY      OTHER ORTHOPEDIC SURGERY  MAY 2010    FRACTURED RIGHT ELBOW    PRIMARY C SECTION         Exam:   /80 (BP Location: Right arm, Patient Position: Sitting, BP Cuff Size: Large adult)   Pulse 91   Temp 36.9 °C (98.5 °F) (Temporal)   Ht 1.575 m (5' 2\")   Wt 102 kg (224 lb)   SpO2 95%  Body mass index is 40.97 kg/m².    Hearing good.    Dentition good  Alert, oriented in no acute distress.  Eye contact is good, speech goal directed, affect calm    Assessment and Plan. The following treatment and monitoring plan is recommended:    1. Morbid obesity with BMI of 40.0-44.9, adult (HCC)  - Patient identified as having weight management issue.  Appropriate orders and counseling given.  - " HEMOGLOBIN A1C; Future  - Lipid Profile; Future    2. Impacted cerumen of right ear  - Ear Irrigation (MA Only); Future    3. Elevated glucose level  - HEMOGLOBIN A1C; Future      Services suggested: No services needed at this time  Health Care Screening: Age-appropriate preventive services recommended by USPTF and ACIP covered by Medicare were discussed today. Services ordered if indicated and agreed upon by the patient.  Referrals offered: Community-based lifestyle interventions to reduce health risks and promote self-management and wellness, fall prevention, nutrition, physical activity, tobacco-use cessation, weight loss, and mental health services as per orders if indicated.    Discussion today about general wellness and lifestyle habits:    Prevent falls and reduce trip hazards; Cautioned about securing or removing rugs.  Have a working fire alarm and carbon monoxide detector;   Engage in regular physical activity and social activities     Follow-up: Return in about 6 months (around 2/8/2024).

## 2023-08-09 ENCOUNTER — HOSPITAL ENCOUNTER (OUTPATIENT)
Dept: HEMATOLOGY ONCOLOGY | Facility: MEDICAL CENTER | Age: 73
End: 2023-08-09
Attending: INTERNAL MEDICINE
Payer: MEDICARE

## 2023-08-09 VITALS
SYSTOLIC BLOOD PRESSURE: 145 MMHG | BODY MASS INDEX: 41.05 KG/M2 | HEART RATE: 87 BPM | DIASTOLIC BLOOD PRESSURE: 84 MMHG | OXYGEN SATURATION: 95 % | WEIGHT: 224.43 LBS

## 2023-08-09 VITALS
TEMPERATURE: 97.3 F | DIASTOLIC BLOOD PRESSURE: 62 MMHG | HEIGHT: 62 IN | BODY MASS INDEX: 40.97 KG/M2 | SYSTOLIC BLOOD PRESSURE: 96 MMHG | HEART RATE: 90 BPM | OXYGEN SATURATION: 95 %

## 2023-08-09 DIAGNOSIS — Z17.0 MALIGNANT NEOPLASM OF UPPER-INNER QUADRANT OF LEFT BREAST IN FEMALE, ESTROGEN RECEPTOR POSITIVE (HCC): ICD-10-CM

## 2023-08-09 DIAGNOSIS — C50.212 MALIGNANT NEOPLASM OF UPPER-INNER QUADRANT OF LEFT BREAST IN FEMALE, ESTROGEN RECEPTOR POSITIVE (HCC): ICD-10-CM

## 2023-08-09 LAB
CHEMOTHERAPY INFUSION START DATE: NORMAL
CHEMOTHERAPY RECORDS: 2.67
CHEMOTHERAPY RECORDS: 4005
CHEMOTHERAPY RECORDS: NORMAL
CHEMOTHERAPY RX CANCER: NORMAL
DATE 1ST CHEMO CANCER: NORMAL
RAD ONC ARIA COURSE LAST TREATMENT DATE: NORMAL
RAD ONC ARIA COURSE TREATMENT ELAPSED DAYS: NORMAL
RAD ONC ARIA REFERENCE POINT DOSAGE GIVEN TO DATE: 18.69
RAD ONC ARIA REFERENCE POINT DOSAGE GIVEN TO DATE: 18.69
RAD ONC ARIA REFERENCE POINT ID: NORMAL
RAD ONC ARIA REFERENCE POINT ID: NORMAL
RAD ONC ARIA REFERENCE POINT SESSION DOSAGE GIVEN: 2.67
RAD ONC ARIA REFERENCE POINT SESSION DOSAGE GIVEN: 2.67

## 2023-08-09 PROCEDURE — 99213 OFFICE O/P EST LOW 20 MIN: CPT | Performed by: INTERNAL MEDICINE

## 2023-08-09 PROCEDURE — 77412 RADIATION TX DELIVERY LVL 3: CPT | Performed by: RADIOLOGY

## 2023-08-09 PROCEDURE — 99212 OFFICE O/P EST SF 10 MIN: CPT | Mod: 25 | Performed by: INTERNAL MEDICINE

## 2023-08-09 ASSESSMENT — PAIN SCALES - GENERAL
PAINLEVEL: NO PAIN
PAINLEVEL: NO PAIN

## 2023-08-09 ASSESSMENT — ENCOUNTER SYMPTOMS
VOMITING: 0
COUGH: 0
NAUSEA: 0
HEADACHES: 1
TINGLING: 0
FEVER: 0
WEIGHT LOSS: 0
CHILLS: 0
DIZZINESS: 0
MYALGIAS: 0
CONSTIPATION: 0
DIARRHEA: 0
PALPITATIONS: 0
SHORTNESS OF BREATH: 0

## 2023-08-09 ASSESSMENT — FIBROSIS 4 INDEX: FIB4 SCORE: 1.48

## 2023-08-09 NOTE — ADDENDUM NOTE
Encounter addended by: William Hillman, Med Ass't on: 8/9/2023 10:27 AM   Actions taken: Charge Capture section accepted

## 2023-08-09 NOTE — ON TREATMENT VISIT
ON TREATMENT NOTE  RADIATION ONCOLOGY DEPARTMENT    Patient name:  Donna Palmer    Primary Physician:  AUGUSTA Gallego MRN: [id number]  [facility]: [id number]   Referring physician:  Lubna Donaldson M.D. : 1950, 72 y.o.     ENCOUNTER DATE:  23    DIAGNOSIS:    Malignant neoplasm of upper-inner quadrant of left breast in female, estrogen receptor positive (HCC)  Staging form: Breast, AJCC 8th Edition  - Clinical stage from 3/6/2023: Stage IIB (cT2, cN0, cM0, G3, ER+, ID-, HER2-) - Signed by Ludin Zaman M.D. on 3/31/2023  Histologic grading system: 3 grade system  - Pathologic stage from 2023: Stage IIB (pT2, pN1mi, cM0, G3, ER+, ID-, HER2-, Oncotype DX score: 39) - Signed by Can Bautista M.D. on 2023  Stage prefix: Initial diagnosis  Multigene prognostic tests performed: Oncotype DX  Recurrence score range: Greater than or equal to 11  Histologic grading system: 3 grade system      TREATMENT SUMMARY:  Aria Treatment Information          2023   Aria Course Treatment Dates   Course First Treatment Date 2023    Course Last Treatment Date 2023    Aria Treatment Summary   L_Breast_HT  Plan from Course C1_LBreastHT   Fraction 7 of 15   Elapsed Course Days  @    Prescribed Fraction Dose 267 cGy   Prescribed Total Dose 4,005 cGy   L_Breast HT CP  Reference Point from Course C1_LBreastHT   Elapsed Course Days  @    Session Dose 267 cGy   Total Dose 1,869 cGy   L_Breast_HT  Reference Point from Course C1_[id number]   Elapsed Course Days  @    Session Dose 267 cGy   Total Dose 1,869 cGy      Radiation Treatments       Active   Plans   L_Breast_HT   Most recent treatment: Dose planned: 267 cGy (fraction 7 of 15 on 2023)   Total: Dose planned: 4,005 cGy   Elapsed Days:  @            Historical   No historical radiation treatments to show.               SUBJECTIVE:   Patient is doing well.  She does not  have any changes related to her radiation.    VITAL SIGNS:    Encounter Vitals  Blood Pressure : (!) 145/84  Pulse: 87  Pulse Oximetry: 95 %  Weight: 102 kg (224 lb 6.9 oz)  Pain Score: No pain      8/9/2023     1:59 PM 8/9/2023     9:27 AM 8/2/2023     2:00 PM 7/6/2023     1:23 PM 5/10/2023    10:20 AM 4/20/2023     2:10 PM   Pain Assessment   Pain Score NO PAIN NO PAIN NO PAIN NO PAIN NO PAIN NO PAIN          PHYSICAL EXAM:  No erythema    TOXICITY      8/9/2023     2:00 PM 8/2/2023     2:02 PM   Toxicity Assessment   Toxicity Assessment Breast Breast   Fatigue (lethargy, malaise, asthenia) None None   Fever (in the absence of neutropenia)  None   Radiation Dermatitis None None   Lymphatics Normal Normal   RT - Pain due to RT None None   Dyspnea Normal Normal         IMPRESSION:  Cancer Staging   Malignant neoplasm of upper-inner quadrant of left breast in female, estrogen receptor positive (HCC)  Staging form: Breast, AJCC 8th Edition  - Clinical stage from 3/6/2023: Stage IIB (cT2, cN0, cM0, G3, ER+, MO-, HER2-) - Signed by Ludin Zaman M.D. on 3/31/2023  - Pathologic stage from 6/22/2023: Stage IIB (pT2, pN1mi, cM0, G3, ER+, MO-, HER2-, Oncotype DX score: 39) - Signed by Can Bautista M.D. on 6/22/2023      PLAN:  No change in treatment plan    Disposition:  Treatment plan reviewed. Questions answered. Continue therapy outlined.     Can Bautista M.D.    Orders Placed This Encounter    Rad Onc Aria Session Summary

## 2023-08-09 NOTE — PROGRESS NOTES
Subjective   Medical oncology visit: 7/5/2023  Donna Palmer is a 72 y.o. female who presents with ER positive breast cancer receiving adjuvant chemotherapy with AC          HPI    Referring Physician: Lubna Donaldson MD  Primary Care:   [facility] Galleg[facility].     Diagnosis: Ductal carcinoma of the left breast, grade 3, clinically stage IIa (cT2, cN0) ER +95%, OK negative, HER2 IHC 2+ FISH negative, Ki-67 30%     Chief Complaint: Patient seen today for evaluation of her breast cancer, for continued monitoring of symptoms and side effects of cancer treatments, employing AC.     Oncology history of presenting illness:  Patient self detected a mass in her left breast in February 2023.  She had not had a mammogram for several years.  A diagnostic mammogram and ultrasound were completed on 3/6/2023 which showed a 2.7 x 2.4 x 2.0 cm spiculated irregular mass at 3 o'clock position of the left breast with no abnormal lymph nodes being detected.  She underwent an immediate ultrasound-guided biopsy demonstrated invasive ductal carcinoma, grade 3, at least 1.7 cm in the core, ER +95%, OK negative, Ki-67 30%, HER2 2+ IHC FISH negative.  She saw Dr. Donaldson in consultation and is planning to undergo partial mastectomy and sentinel lymph node biopsy.     Her history is notable for post polio syndrome with motor nerve impairment which is gotten gradually worse over the last couple of years.  She now has to use a scooter almost all the time and has started having impairment in activities of daily living.  Otherwise she has no chronic medical problems.  She has a maternal aunt who had breast cancer.  No other breast or ovarian cancer is noted.     Interval histories:  Interval history 4/20/2023: She underwent left partial mastectomy and sentinel lymph node biopsy on 4/4/2023.  Pathology demonstrated invasive ductal carcinoma 3.5 cm in greatest dimension with focal high-grade DCIS.  Margins were clear and there was no lymph  vascular invasion.  1 sentinel lymph node was positive for micrometastases and 1 sentinel lymph node was negative.  Postoperative course was unremarkable but she does feel a firm mass at the incision site which is consistent with a seroma in the breast.  We sent an Oncotype DX which came back with recurrence score of 39.  She has no history of heart problems but does have history of hypertension.     Interval history 05/10/23 (CAlsop, APRN):  Patient seen today for her toxicity check following her first dose of chemotherapy employing AC.  Patient appeared to tolerate treatment very well.  She has not required any antinausea medications.  She had very mild underlying nausea but again no need for medication.  She does complain of constipation which is new with treatment.  She did have a bowel movement last night.  She was quite constipated after treatment and ended up taking milk of magnesia dose on Sunday night which caused diarrhea.  She also noticed a very mild elevation in temperature but that resolved.  She states her appetite has been good and she is eating well.  She has noticed decrease in energy this past week as well.     Interval history 05/24/23 (CAlsop, APRN):  Patient is apprehensive and scared about cycle 2 but overall she has been doing well.  She stated that her constipation is resolved and she is having normal bowel movements.  She did state that her hair started to fall out so she recently shaved it which she has been dealing with mentally.  Otherwise no other significant clinical complaints.  Derm at the incision site is still present but appears to be decreasing in size.  According to the patient she did reach out to Dr. Donaldson and was informed that there was no concerns at this time.     Interval history 06/14/23 (CAlsop, APRN):  Patient has done well.  She did have what she believed to be a urinary tract infection.  UA was negative for any bacteria but she did have positive leukocytes.  She was  quite symptomatic with pain, burning and pressure with urination, therefore I did treat her with a short course of antibiotics with Macrobid.  She did complete the antibiotics and as of last night her symptoms have completely resolved.  She denies any foul odor or vaginal discharge.  She denies any nausea or vomiting.  Her constipation is well controlled.  She did have a headache for just a few days after treatment but otherwise no other complaints.    Interval history 7/5/2023: She comes in for her fourth and final cycle of Adriamycin and cyclophosphamide.  Overall she has done well with it.  She has about a week of feeling Malays and puny after treatment.  She has minimal nausea and has not taken any antiemetics after her premeds.  She did get a headache again but it was better than the previous time.  She does not wish to change her antiemetic regimen.  She had symptoms of urinary tract infection but a negative UA after the last cycle again and this is likely mucosal irritation.  She will be seeing radiation next week.  She has generalized weakness but no new focal motor weakness.    Interval history 8/9/2023: She is getting radiation therapy and is about long term done with a 15-day treatment.  She remains fatigued from the chemotherapy but her urinary symptoms have resolved.  No nausea or vomiting.  She has no increase in her stable motor weakness.  She would like to get her port out before the end of the year.  Her labs are stable and do not require frequent checking now.    Treatment history:  04/04/23: Left partial mastectomy and sentinel lymph node biopsy - Dr. Donaldson  05/03/23: C1D1 Adriamycin and Cyclophosphamide  05/24/23: C2D1 Adriamycin and Cyclophosphamide  06/14/23: C3D1 Adriamycin and Cyclophosphamide  7/5/2023: C4 D1 Adriamycin and cyclophosphamide      Allergies   Allergen Reactions    Penicillin G Hives     As a child    Tetracycline Vomiting     Current Outpatient Medications on File Prior to  "Encounter   Medication Sig Dispense Refill    lidocaine-prilocaine (EMLA) 2.5-2.5 % Cream Apply to port site 1 hour prior to port access as needed daily 30 g 3    Multiple Vitamins-Minerals (MULTIVITAMIN ADULT PO) Take  by mouth.       No current facility-administered medications on file prior to encounter.       Review of Systems   Constitutional:  Negative for chills, fever and weight loss.   Respiratory:  Negative for cough and shortness of breath.    Cardiovascular:  Negative for chest pain and palpitations.   Gastrointestinal:  Negative for constipation, diarrhea, nausea and vomiting.   Genitourinary:  Negative for dysuria.   Musculoskeletal:  Negative for myalgias.   Neurological:  Positive for headaches (slightly for a few days after chemo and then resolves). Negative for dizziness and tingling.              Objective     BP 96/62 (BP Location: Right arm, Patient Position: Sitting)   Pulse 90   Temp 36.3 °C (97.3 °F) (Temporal)   Ht 1.575 m (5' 2\")   SpO2 95%   BMI 40.97 kg/m²      Physical Exam  Vitals reviewed.   Constitutional:       General: She is not in acute distress.     Appearance: Normal appearance. She is not diaphoretic.   HENT:      Head: Normocephalic and atraumatic.   Cardiovascular:      Rate and Rhythm: Normal rate and regular rhythm.      Heart sounds: Normal heart sounds. No murmur heard.     No friction rub. No gallop.   Pulmonary:      Effort: Pulmonary effort is normal. No respiratory distress.      Breath sounds: Normal breath sounds. No wheezing.   Abdominal:      General: Bowel sounds are normal. There is no distension.      Palpations: Abdomen is soft.      Tenderness: There is no abdominal tenderness.   Musculoskeletal:      Comments: Confined to a wheel chair   Skin:     General: Skin is warm and dry.   Neurological:      Mental Status: She is alert and oriented to person, place, and time.   Psychiatric:         Mood and Affect: Mood normal.         Behavior: Behavior normal. "               Latest Reference Range & Units 06/13/23 16:08   WBC 4.8 - 10.8 K/uL 4.8   RBC 4.20 - 5.40 M/uL 4.34   Hemoglobin 12.0 - 16.0 g/dL 12.2   Hematocrit 37.0 - 47.0 % 37.9   MCV 81.4 - 97.8 fL 87.3   MCH 27.0 - 33.0 pg 28.1   MCHC 32.2 - 35.5 g/dL 32.2   RDW 35.9 - 50.0 fL 44.3   Platelet Count 164 - 446 K/uL 371   MPV 9.0 - 12.9 fL 9.5   Neutrophils-Polys 44.00 - 72.00 % 57.70   Neutrophils (Absolute) 1.82 - 7.42 K/uL 2.78   Lymphocytes 22.00 - 41.00 % 19.00 (L)   Lymphs (Absolute) 1.00 - 4.80 K/uL 0.92 (L)   Monocytes 0.00 - 13.40 % 19.00 (H)   Monos (Absolute) 0.00 - 0.85 K/uL 0.92 (H)   Eosinophils 0.00 - 6.90 % 0.60   Eos (Absolute) 0.00 - 0.51 K/uL 0.03   Basophils 0.00 - 1.80 % 1.00   Baso (Absolute) 0.00 - 0.12 K/uL 0.05   Immature Granulocytes 0.00 - 0.90 % 2.70 (H)   Immature Granulocytes (abs) 0.00 - 0.11 K/uL 0.13 (H)   Nucleated RBC 0.00 - 0.20 /100 WBC 0.00   NRBC (Absolute) K/uL 0.00   Outpt Infus CBC Comment  see below   Sodium 135 - 145 mmol/L 143   Potassium 3.6 - 5.5 mmol/L 4.0   Chloride 96 - 112 mmol/L 106   Co2 20 - 33 mmol/L 24   Anion Gap 7.0 - 16.0  13.0   Glucose 65 - 99 mg/dL 123 (H)   Bun 8 - 22 mg/dL 10   Creatinine 0.50 - 1.40 mg/dL 0.56   GFR (CKD-EPI) >60 mL/min/1.73 m 2 98   Calcium 8.5 - 10.5 mg/dL 9.2   Correct Calcium 8.5 - 10.5 mg/dL 9.3   AST(SGOT) 12 - 45 U/L 27   ALT(SGPT) 2 - 50 U/L 22   Alkaline Phosphatase 30 - 99 U/L 76   Total Bilirubin 0.1 - 1.5 mg/dL 0.2   Albumin 3.2 - 4.9 g/dL 3.9   Total Protein 6.0 - 8.2 g/dL 6.3   Globulin 1.9 - 3.5 g/dL 2.4   A-G Ratio g/dL 1.6              Assessment & Plan        Impression:  1.  Invasive ductal carcinoma of the left breast, grade 3, clinically stage IIa (cT2, cN0) ER +95%, UT negative, HER2 IHC 2+ FISH negative, Ki-67 30%.  Status post left partial mastectomy and sentinel lymph node biopsy for invasive ductal carcinoma, grade 3, stage IIb (pT2, PN 1 LEXI).  Oncotype DX recurrence score 39.  She is a good candidate for  adjuvant chemotherapy.  Due to her postpolio syndrome AC x 4 was given as adjuvant chemotherapy to avoid taxanes and was reasonably well-tolerated.  Now receiving radiation therapy to the whole breast.  2.  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  Stable.  3.  Constipation improved  4.  Headaches after chemotherapy resolved    Plan:  We will see her back in 8 weeks to initiate endocrine therapy after radiation symptoms resolved.  We will get her port out before the end of the year.  Please note that this dictation was created using voice recognition software. I have made every reasonable attempt to correct obvious errors, but I expect that there are errors of grammar and possibly content that I did not discover before finalizing the note.

## 2023-08-09 NOTE — LETTER
[facility]   [address] [place] [address],   [address]  [place] [address]  Phone: [phone]  Fax: [phone]              Donna Palmer  1950    Encounter Date: 8/9/2023    Ludin Zaman M.D.          PROGRESS NOTE:  Subjective   Medical oncology visit: 7/5/2023  Donna Palmer is a 72 y.o. female who presents with ER positive breast cancer receiving adjuvant chemotherapy with AC          HPI    Referring Physician: Lubna Donaldson MD  Primary Care:   [facility]leg[facility]     Diagnosis: Ductal carcinoma of the left breast, grade 3, clinically stage IIa (cT2, cN0) ER +95%, OH negative, HER2 IHC 2+ FISH negative, Ki-67 30%     Chief Complaint: Patient seen today for evaluation of her breast cancer, for continued monitoring of symptoms and side effects of cancer treatments, employing AC.     Oncology history of presenting illness:  Patient self detected a mass in her left breast in February 2023.  She had not had a mammogram for several years.  A diagnostic mammogram and ultrasound were completed on 3/6/2023 which showed a 2.7 x 2.4 x 2.0 cm spiculated irregular mass at 3 o'clock position of the left breast with no abnormal lymph nodes being detected.  She underwent an immediate ultrasound-guided biopsy demonstrated invasive ductal carcinoma, grade 3, at least 1.7 cm in the core, ER +95%, OH negative, Ki-67 30%, HER2 2+ IHC FISH negative.  She saw Dr. Donaldson in consultation and is planning to undergo partial mastectomy and sentinel lymph node biopsy.     Her history is notable for post polio syndrome with motor nerve impairment which is gotten gradually worse over the last couple of years.  She now has to use a scooter almost all the time and has started having impairment in activities of daily living.  Otherwise she has no chronic medical problems.  She has a maternal aunt who had breast cancer.  No other breast or ovarian cancer is noted.     Interval histories:  Interval history  4/20/2023: She underwent left partial mastectomy and sentinel lymph node biopsy on 4/4/2023.  Pathology demonstrated invasive ductal carcinoma 3.5 cm in greatest dimension with focal high-grade DCIS.  Margins were clear and there was no lymph vascular invasion.  1 sentinel lymph node was positive for micrometastases and 1 sentinel lymph node was negative.  Postoperative course was unremarkable but she does feel a firm mass at the incision site which is consistent with a seroma in the breast.  We sent an Oncotype DX which came back with recurrence score of 39.  She has no history of heart problems but does have history of hypertension.     Interval history 05/10/23 (CAlsop, APRN):  Patient seen today for her toxicity check following her first dose of chemotherapy employing AC.  Patient appeared to tolerate treatment very well.  She has not required any antinausea medications.  She had very mild underlying nausea but again no need for medication.  She does complain of constipation which is new with treatment.  She did have a bowel movement last night.  She was quite constipated after treatment and ended up taking milk of magnesia dose on Sunday night which caused diarrhea.  She also noticed a very mild elevation in temperature but that resolved.  She states her appetite has been good and she is eating well.  She has noticed decrease in energy this past week as well.     Interval history 05/24/23 (CAlsop, APRN):  Patient is apprehensive and scared about cycle 2 but overall she has been doing well.  She stated that her constipation is resolved and she is having normal bowel movements.  She did state that her hair started to fall out so she recently shaved it which she has been dealing with mentally.  Otherwise no other significant clinical complaints.  Derm at the incision site is still present but appears to be decreasing in size.  According to the patient she did reach out to Dr. Donaldson and was informed that there was no  concerns at this time.     Interval history 06/14/23 (Yulissa, APRN):  Patient has done well.  She did have what she believed to be a urinary tract infection.  UA was negative for any bacteria but she did have positive leukocytes.  She was quite symptomatic with pain, burning and pressure with urination, therefore I did treat her with a short course of antibiotics with Macrobid.  She did complete the antibiotics and as of last night her symptoms have completely resolved.  She denies any foul odor or vaginal discharge.  She denies any nausea or vomiting.  Her constipation is well controlled.  She did have a headache for just a few days after treatment but otherwise no other complaints.    Interval history 7/5/2023: She comes in for her fourth and final cycle of Adriamycin and cyclophosphamide.  Overall she has done well with it.  She has about a week of feeling Malays and puny after treatment.  She has minimal nausea and has not taken any antiemetics after her premeds.  She did get a headache again but it was better than the previous time.  She does not wish to change her antiemetic regimen.  She had symptoms of urinary tract infection but a negative UA after the last cycle again and this is likely mucosal irritation.  She will be seeing radiation next week.  She has generalized weakness but no new focal motor weakness.    Interval history 8/9/2023: She is getting radiation therapy and is about senior care done with a 15-day treatment.  She remains fatigued from the chemotherapy but her urinary symptoms have resolved.  No nausea or vomiting.  She has no increase in her stable motor weakness.  She would like to get her port out before the end of the year.  Her labs are stable and do not require frequent checking now.    Treatment history:  04/04/23: Left partial mastectomy and sentinel lymph node biopsy - Dr. Donaldson  05/03/23: C1D1 Adriamycin and Cyclophosphamide  05/24/23: C2D1 Adriamycin and Cyclophosphamide  06/14/23: C3D1  "Adriamycin and Cyclophosphamide  7/5/2023: C4 D1 Adriamycin and cyclophosphamide      Allergies   Allergen Reactions   • Penicillin G Hives     As a child   • Tetracycline Vomiting     Current Outpatient Medications on File Prior to Encounter   Medication Sig Dispense Refill   • lidocaine-prilocaine (EMLA) 2.5-2.5 % Cream Apply to port site 1 hour prior to port access as needed daily 30 g 3   • Multiple Vitamins-Minerals (MULTIVITAMIN ADULT PO) Take  by mouth.       No current facility-administered medications on file prior to encounter.       Review of Systems   Constitutional:  Negative for chills, fever and weight loss.   Respiratory:  Negative for cough and shortness of breath.    Cardiovascular:  Negative for chest pain and palpitations.   Gastrointestinal:  Negative for constipation, diarrhea, nausea and vomiting.   Genitourinary:  Negative for dysuria.   Musculoskeletal:  Negative for myalgias.   Neurological:  Positive for headaches (slightly for a few days after chemo and then resolves). Negative for dizziness and tingling.              Objective     BP 96/62 (BP Location: Right arm, Patient Position: Sitting)   Pulse 90   Temp 36.3 °C (97.3 °F) (Temporal)   Ht 1.575 m (5' 2\")   SpO2 95%   BMI 40.97 kg/m²      Physical Exam  Vitals reviewed.   Constitutional:       General: She is not in acute distress.     Appearance: Normal appearance. She is not diaphoretic.   HENT:      Head: Normocephalic and atraumatic.   Cardiovascular:      Rate and Rhythm: Normal rate and regular rhythm.      Heart sounds: Normal heart sounds. No murmur heard.     No friction rub. No gallop.   Pulmonary:      Effort: Pulmonary effort is normal. No respiratory distress.      Breath sounds: Normal breath sounds. No wheezing.   Abdominal:      General: Bowel sounds are normal. There is no distension.      Palpations: Abdomen is soft.      Tenderness: There is no abdominal tenderness.   Musculoskeletal:      Comments: Confined to " a wheel chair   Skin:     General: Skin is warm and dry.   Neurological:      Mental Status: She is alert and oriented to person, place, and time.   Psychiatric:         Mood and Affect: Mood normal.         Behavior: Behavior normal.               Latest Reference Range & Units 06/13/23 16:08   WBC 4.8 - 10.8 K/uL 4.8   RBC 4.20 - 5.40 M/uL 4.34   Hemoglobin 12.0 - 16.0 g/dL 12.2   Hematocrit 37.0 - 47.0 % 37.9   MCV 81.4 - 97.8 fL 87.3   MCH 27.0 - 33.0 pg 28.1   MCHC 32.2 - 35.5 g/dL 32.2   RDW 35.9 - 50.0 fL 44.3   Platelet Count 164 - 446 K/uL 371   MPV 9.0 - 12.9 fL 9.5   Neutrophils-Polys 44.00 - 72.00 % 57.70   Neutrophils (Absolute) 1.82 - 7.42 K/uL 2.78   Lymphocytes 22.00 - 41.00 % 19.00 (L)   Lymphs (Absolute) 1.00 - 4.80 K/uL 0.92 (L)   Monocytes 0.00 - 13.40 % 19.00 (H)   Monos (Absolute) 0.00 - 0.85 K/uL 0.92 (H)   Eosinophils 0.00 - 6.90 % 0.60   Eos (Absolute) 0.00 - 0.51 K/uL 0.03   Basophils 0.00 - 1.80 % 1.00   Baso (Absolute) 0.00 - 0.12 K/uL 0.05   Immature Granulocytes 0.00 - 0.90 % 2.70 (H)   Immature Granulocytes (abs) 0.00 - 0.11 K/uL 0.13 (H)   Nucleated RBC 0.00 - 0.20 /100 WBC 0.00   NRBC (Absolute) K/uL 0.00   Outpt Infus CBC Comment  see below   Sodium 135 - 145 mmol/L 143   Potassium 3.6 - 5.5 mmol/L 4.0   Chloride 96 - 112 mmol/L 106   Co2 20 - 33 mmol/L 24   Anion Gap 7.0 - 16.0  13.0   Glucose 65 - 99 mg/dL 123 (H)   Bun 8 - 22 mg/dL 10   Creatinine 0.50 - 1.40 mg/dL 0.56   GFR (CKD-EPI) >60 mL/min/1.73 m 2 98   Calcium 8.5 - 10.5 mg/dL 9.2   Correct Calcium 8.5 - 10.5 mg/dL 9.3   AST(SGOT) 12 - 45 U/L 27   ALT(SGPT) 2 - 50 U/L 22   Alkaline Phosphatase 30 - 99 U/L 76   Total Bilirubin 0.1 - 1.5 mg/dL 0.2   Albumin 3.2 - 4.9 g/dL 3.9   Total Protein 6.0 - 8.2 g/dL 6.3   Globulin 1.9 - 3.5 g/dL 2.4   A-G Ratio g/dL 1.6              Assessment & Plan        Impression:  1.  Invasive ductal carcinoma of the left breast, grade 3, clinically stage IIa (cT2, cN0) ER +95%, TN negative,  HER2 IHC 2+ FISH negative, Ki-67 30%.  Status post left partial mastectomy and sentinel lymph node biopsy for invasive ductal carcinoma, grade 3, stage IIb (pT2, PN 1 LEXI).  Oncotype DX recurrence score 39.  She is a good candidate for adjuvant chemotherapy.  Due to her postpolio syndrome AC x 4 was given as adjuvant chemotherapy to avoid taxanes and was reasonably well-tolerated.  Now receiving radiation therapy to the whole breast.  2.  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  Stable.  3.  Constipation improved  4.  Headaches after chemotherapy resolved    Plan:  We will see her back in 8 weeks to initiate endocrine therapy after radiation symptoms resolved.  We will get her port out before the end of the year.  Please note that this dictation was created using voice recognition software. I have made every reasonable attempt to correct obvious errors, but I expect that there are errors of grammar and possibly content that I did not discover before finalizing the note.              Lubna Donaldson M.D.  [address] [place] [id number]  Via Fax: [phone]

## 2023-08-10 ENCOUNTER — APPOINTMENT (OUTPATIENT)
Dept: RADIATION ONCOLOGY | Facility: MEDICAL CENTER | Age: 73
End: 2023-08-10
Payer: MEDICARE

## 2023-08-10 PROCEDURE — 77412 RADIATION TX DELIVERY LVL 3: CPT | Performed by: RADIOLOGY

## 2023-08-10 PROCEDURE — 77336 RADIATION PHYSICS CONSULT: CPT | Performed by: RADIOLOGY

## 2023-08-11 ENCOUNTER — APPOINTMENT (OUTPATIENT)
Dept: MEDICAL GROUP | Facility: PHYSICIAN GROUP | Age: 73
End: 2023-08-11
Payer: MEDICARE

## 2023-08-11 PROCEDURE — 77412 RADIATION TX DELIVERY LVL 3: CPT | Performed by: RADIOLOGY

## 2023-08-14 ENCOUNTER — APPOINTMENT (OUTPATIENT)
Dept: RADIATION ONCOLOGY | Facility: MEDICAL CENTER | Age: 73
End: 2023-08-14
Payer: MEDICARE

## 2023-08-14 PROCEDURE — 77412 RADIATION TX DELIVERY LVL 3: CPT | Performed by: RADIOLOGY

## 2023-08-14 PROCEDURE — 77427 RADIATION TX MANAGEMENT X5: CPT | Performed by: RADIOLOGY

## 2023-08-15 ENCOUNTER — APPOINTMENT (OUTPATIENT)
Dept: RADIATION ONCOLOGY | Facility: MEDICAL CENTER | Age: 73
End: 2023-08-15
Payer: MEDICARE

## 2023-08-15 PROCEDURE — 77412 RADIATION TX DELIVERY LVL 3: CPT | Performed by: RADIOLOGY

## 2023-08-15 PROCEDURE — 77417 THER RADIOLOGY PORT IMAGE(S): CPT | Performed by: RADIOLOGY

## 2023-08-16 ENCOUNTER — APPOINTMENT (OUTPATIENT)
Dept: RADIATION ONCOLOGY | Facility: MEDICAL CENTER | Age: 73
End: 2023-08-16
Payer: MEDICARE

## 2023-08-16 VITALS — SYSTOLIC BLOOD PRESSURE: 119 MMHG | HEART RATE: 97 BPM | OXYGEN SATURATION: 95 % | DIASTOLIC BLOOD PRESSURE: 78 MMHG

## 2023-08-16 LAB
CHEMOTHERAPY INFUSION START DATE: NORMAL
CHEMOTHERAPY RECORDS: 2.67
CHEMOTHERAPY RECORDS: 4005
CHEMOTHERAPY RECORDS: NORMAL
CHEMOTHERAPY RX CANCER: NORMAL
DATE 1ST CHEMO CANCER: NORMAL
RAD ONC ARIA COURSE LAST TREATMENT DATE: NORMAL
RAD ONC ARIA COURSE TREATMENT ELAPSED DAYS: NORMAL
RAD ONC ARIA REFERENCE POINT DOSAGE GIVEN TO DATE: 32.04
RAD ONC ARIA REFERENCE POINT DOSAGE GIVEN TO DATE: 32.04
RAD ONC ARIA REFERENCE POINT ID: NORMAL
RAD ONC ARIA REFERENCE POINT ID: NORMAL
RAD ONC ARIA REFERENCE POINT SESSION DOSAGE GIVEN: 2.67
RAD ONC ARIA REFERENCE POINT SESSION DOSAGE GIVEN: 2.67

## 2023-08-16 PROCEDURE — 77412 RADIATION TX DELIVERY LVL 3: CPT | Performed by: RADIOLOGY

## 2023-08-16 ASSESSMENT — PAIN SCALES - GENERAL: PAINLEVEL: NO PAIN

## 2023-08-16 NOTE — ON TREATMENT VISIT
ON TREATMENT NOTE  RADIATION ONCOLOGY DEPARTMENT    Patient name:  Donna Palmer    Primary Physician:  AUGUSTA Gallego MRN: [id number]  CSN: [id number]   Referring physician:  Lubna Donaldson M.D. : 1950, 72 y.o.     ENCOUNTER DATE:  23    DIAGNOSIS:    Malignant neoplasm of upper-inner quadrant of left breast in female, estrogen receptor positive (HCC)  Staging form: Breast, AJCC 8th Edition  - Clinical stage from 3/6/2023: Stage IIB (cT2, cN0, cM0, G3, ER+, MS-, HER2-) - Signed by Ludin Zaman M.D. on 3/31/2023  Histologic grading system: 3 grade system  - Pathologic stage from 2023: Stage IIB (pT2, pN1mi, cM0, G3, ER+, MS-, HER2-, Oncotype DX score: 39) - Signed by Can Bautista M.D. on 2023  Stage prefix: Initial diagnosis  Multigene prognostic tests performed: Oncotype DX  Recurrence score range: Greater than or equal to 11  Histologic grading system: 3 grade system      TREATMENT SUMMARY:  Aria Treatment Information          2023   Aria Course Treatment Dates   Course First Treatment Date 2023    Course Last Treatment Date 2023    Aria Treatment Summary   L_Breast_HT  Plan from Course C1_LBreastHT   Fraction 12 of 15   Elapsed Course Days  @    Prescribed Fraction Dose 267 cGy   Prescribed Total Dose 4,005 cGy   L_Breast HT CP  Reference Point from Course C1_[id number]   Elapsed Course Days  @ [id number]   Session Dose 267 cGy   Total Dose 3,204 cGy   L_Breast_HT  Reference Point from Course C1_[id number]   Elapsed Course Days  @    Session Dose 267 cGy   Total Dose 3,204 cGy      Radiation Treatments       Active   Plans   L_Breast_HT   Most recent treatment: Dose planned: 267 cGy (fraction 12 of 15 on 2023)   Total: Dose planned: 4,005 cGy   Elapsed Days:  @            Historical   No historical radiation treatments to show.               SUBJECTIVE:   Patient is doing well.  She is  starting to experience some erythema around the nipple areolar complex area.    VITAL SIGNS:    Encounter Vitals  Blood Pressure : 119/78  Pulse: 97  Pulse Oximetry: 95 %  Pain Score: No pain      8/16/2023     1:49 PM 8/9/2023     1:59 PM 8/9/2023     9:27 AM 8/2/2023     2:00 PM 7/6/2023     1:23 PM 5/10/2023    10:20 AM   Pain Assessment   Pain Score NO PAIN NO PAIN NO PAIN NO PAIN NO PAIN NO PAIN          PHYSICAL EXAM:  Mild erythema in the treatment field    TOXICITY      8/16/2023     1:51 PM 8/9/2023     2:00 PM 8/2/2023     2:02 PM   Toxicity Assessment   Toxicity Assessment Breast Breast Breast   Fatigue (lethargy, malaise, asthenia) Increased fatigue over baseline, but not altering normal activities None None   Fever (in the absence of neutropenia) None  None   Radiation Dermatitis None None None   Lymphatics Normal Normal Normal   RT - Pain due to RT None None None   Dyspnea Normal Normal Normal         IMPRESSION:  Cancer Staging   Malignant neoplasm of upper-inner quadrant of left breast in female, estrogen receptor positive (HCC)  Staging form: Breast, AJCC 8th Edition  - Clinical stage from 3/6/2023: Stage IIB (cT2, cN0, cM0, G3, ER+, DE-, HER2-) - Signed by Ludin Zaman M.D. on 3/31/2023  - Pathologic stage from 6/22/2023: Stage IIB (pT2, pN1mi, cM0, G3, ER+, DE-, HER2-, Oncotype DX score: 39) - Signed by Can Bautista M.D. on 6/22/2023      PLAN:  No change in treatment plan    Disposition:  Treatment plan reviewed. Questions answered. Continue therapy outlined.     Can Bautista M.D.    Orders Placed This Encounter    Rad Onc Aria Session Summary

## 2023-08-17 ENCOUNTER — APPOINTMENT (OUTPATIENT)
Dept: RADIATION ONCOLOGY | Facility: MEDICAL CENTER | Age: 73
End: 2023-08-17
Payer: MEDICARE

## 2023-08-17 PROCEDURE — 77412 RADIATION TX DELIVERY LVL 3: CPT | Performed by: RADIOLOGY

## 2023-08-17 PROCEDURE — 77336 RADIATION PHYSICS CONSULT: CPT | Performed by: RADIOLOGY

## 2023-08-18 ENCOUNTER — APPOINTMENT (OUTPATIENT)
Dept: RADIATION ONCOLOGY | Facility: MEDICAL CENTER | Age: 73
End: 2023-08-18
Payer: MEDICARE

## 2023-08-18 PROCEDURE — 77412 RADIATION TX DELIVERY LVL 3: CPT | Performed by: RADIOLOGY

## 2023-08-21 ENCOUNTER — APPOINTMENT (OUTPATIENT)
Dept: RADIATION ONCOLOGY | Facility: MEDICAL CENTER | Age: 73
End: 2023-08-21
Payer: MEDICARE

## 2023-08-21 PROCEDURE — 77412 RADIATION TX DELIVERY LVL 3: CPT | Performed by: RADIOLOGY

## 2023-08-21 PROCEDURE — 77427 RADIATION TX MANAGEMENT X5: CPT | Performed by: RADIOLOGY

## 2023-08-23 NOTE — ADDENDUM NOTE
Encounter addended by: Can Bautista M.D. on: 8/23/2023 2:12 PM   Actions taken: Clinical Note Signed

## 2023-08-23 NOTE — RADIATION COMPLETION NOTES
END OF TREATMENT SUMMARY    Patient name:  Donna Palmer    Primary Physician:  AUGUSTA Gallego MRN: [id number]  [facility]: [id number]   Referring physician:  Lubna Donaldson M.D.  : 1950, 72 y.o.       TREATMENT SUMMARY:        Course First Treatment Date 2023    Course Last Treatment Date 2023   Course Elapsed Days  @ [id number]   Course Intent Curative     Radiation Therapy Episodes       Active Episodes       Radiation Therapy: 3D CRT                   Radiation Treatments         Plan Last Treated On Elapsed Days Fractions Treated Prescribed Fraction Dose (cGy) Prescribed Total Dose (cGy)    L_Breast_HT 2023 21 @ [id number] 15 of 15 267 4,005                  Reference Point Last Treated On Elapsed Days Most Recent Session Dose (cGy) Total Dose (cGy)    L_Breast HT CP 2023 21 @ [id number] -- 4,005    L_Breast_HT 2023 21 @ [id number] -- 4,005                                     STAGE:   Malignant neoplasm of upper-inner quadrant of left breast in female, estrogen receptor positive (HCC)  Staging form: Breast, AJCC 8th Edition  - Clinical stage from 3/6/2023: Stage IIB (cT2, cN0, cM0, G3, ER+, AL-, HER2-) - Signed by Ludin Zaman M.D. on 3/31/2023  Histologic grading system: 3 grade system  - Pathologic stage from 2023: Stage IIB (pT2, pN1mi, cM0, G3, ER+, AL-, HER2-, Oncotype DX score: 39) - Signed by Can Bautista M.D. on 2023  Stage prefix: Initial diagnosis  Multigene prognostic tests performed: Oncotype DX  Recurrence score range: Greater than or equal to 11  Histologic grading system: 3 grade system       TREATMENT INDICATION:   Breast conservation surgery, microscopic lymph node     CONCURRENT SYSTEMIC TREATMENT:   Sequential     RT COURSE DISCONTINUED EARLY:   No     PATIENT EXPERIENCE:       2023     1:51 PM 2023     2:00 PM 2023     2:02 PM   Toxicity Assessment   Toxicity Assessment Breast Breast Breast    Fatigue (lethargy, malaise, asthenia) Increased fatigue over baseline, but not altering normal activities None None   Fever (in the absence of neutropenia) None  None   Radiation Dermatitis None None None   Lymphatics Normal Normal Normal   RT - Pain due to RT None None None   Dyspnea Normal Normal Normal        FOLLOW-UP PLAN:   6 Weeks     COMMENT:          ANATOMIC TARGET SUMMARY    ANATOMIC TARGET MODALITY TECHNIQUE   Left breast, high tangent   External beam, photons 3D Conformal            COMMENT:         DIAGRAMS:      DOSE VOLUME HISTOGRAMS:

## 2023-08-30 RX ORDER — 0.9 % SODIUM CHLORIDE 0.9 %
20 VIAL (ML) INJECTION PRN
Status: CANCELLED | OUTPATIENT
Start: 2023-09-06

## 2023-09-06 ENCOUNTER — OUTPATIENT INFUSION SERVICES (OUTPATIENT)
Dept: ONCOLOGY | Facility: MEDICAL CENTER | Age: 73
End: 2023-09-06
Attending: INTERNAL MEDICINE
Payer: MEDICARE

## 2023-09-06 VITALS
TEMPERATURE: 98 F | OXYGEN SATURATION: 94 % | HEART RATE: 105 BPM | DIASTOLIC BLOOD PRESSURE: 90 MMHG | SYSTOLIC BLOOD PRESSURE: 116 MMHG | RESPIRATION RATE: 18 BRPM

## 2023-09-06 DIAGNOSIS — Z17.0 MALIGNANT NEOPLASM OF UPPER-INNER QUADRANT OF LEFT BREAST IN FEMALE, ESTROGEN RECEPTOR POSITIVE (HCC): ICD-10-CM

## 2023-09-06 DIAGNOSIS — C50.212 MALIGNANT NEOPLASM OF UPPER-INNER QUADRANT OF LEFT BREAST IN FEMALE, ESTROGEN RECEPTOR POSITIVE (HCC): ICD-10-CM

## 2023-09-06 PROCEDURE — 96523 IRRIG DRUG DELIVERY DEVICE: CPT

## 2023-09-06 PROCEDURE — 700111 HCHG RX REV CODE 636 W/ 250 OVERRIDE (IP): Performed by: NURSE PRACTITIONER

## 2023-09-06 PROCEDURE — A4212 NON CORING NEEDLE OR STYLET: HCPCS

## 2023-09-06 RX ADMIN — HEPARIN 500 UNITS: 100 SYRINGE at 11:10

## 2023-09-06 NOTE — PROGRESS NOTES
[facility]. Denies having any labs to draw. Reports feeling well. Right chest port in place. EMLA appied over port site. Port accessed in sterile manner, brisk blood return observed, flushed per policy, heparinized and de-accessed with needle intact, gauze dressing placed. Pt has future appointments. Pt discharged to home in good condition with family.

## 2023-09-27 RX ORDER — 0.9 % SODIUM CHLORIDE 0.9 %
20 VIAL (ML) INJECTION PRN
Status: CANCELLED | OUTPATIENT
Start: 2023-10-04

## 2023-09-30 ASSESSMENT — LIFESTYLE VARIABLES
TOBACCO_USE: NO
SMOKING_STATUS: NO

## 2023-10-03 ENCOUNTER — HOSPITAL ENCOUNTER (OUTPATIENT)
Dept: RADIATION ONCOLOGY | Facility: MEDICAL CENTER | Age: 73
End: 2023-10-31
Attending: RADIOLOGY
Payer: MEDICARE

## 2023-10-03 NOTE — PROGRESS NOTES
"Telephone Appointment Visit   This telephone visit was initiated by the patient and they verbally consented.    Reason for Call:  Symptom Follow-up    HPI:    Stage IIB ([id number]) G3 invasive ductal carcinoma of the left upper outer quadrant of breast ER positive WA negative HER2/tae negative with a Ki-67 of 30% Oncotype score 39 status post lumpectomy and sentinel lymph node biopsy on 4/4/2023, AC x4, completing left-sided high tangent to 4005 cGy in 15 fractions in August 2023 planning on an aromatase inhibitor     After completing radiation she still had several weeks of continued hyperpigmentation most notable in the nipple areolar complex area and mid axillary line.  Most of the breast has returned to its baseline but still has some hyperpigmentation and \"rough feeling\" in the axilla and nipple areolar complex.  She has not yet started on her aromatase inhibitor but plans on seeing Dr. Zaman later this week.    Labs / Images Reviewed:   none     Assessment and Plan:     While patient continues to follow in medical oncology I will release her from active follow-up in radiation oncology.  Patient knows should she have any questions and concerns at any time she should feel free to contact me.    Follow-up: as needed    Total Time Spent (mins): 10    Can Bautista M.D.   "

## 2023-10-04 ENCOUNTER — OUTPATIENT INFUSION SERVICES (OUTPATIENT)
Dept: ONCOLOGY | Facility: MEDICAL CENTER | Age: 73
End: 2023-10-04
Attending: INTERNAL MEDICINE
Payer: MEDICARE

## 2023-10-04 VITALS
TEMPERATURE: 98.3 F | HEART RATE: 93 BPM | DIASTOLIC BLOOD PRESSURE: 81 MMHG | RESPIRATION RATE: 18 BRPM | OXYGEN SATURATION: 94 % | SYSTOLIC BLOOD PRESSURE: 145 MMHG

## 2023-10-04 DIAGNOSIS — C50.212 MALIGNANT NEOPLASM OF UPPER-INNER QUADRANT OF LEFT BREAST IN FEMALE, ESTROGEN RECEPTOR POSITIVE (HCC): ICD-10-CM

## 2023-10-04 DIAGNOSIS — Z17.0 MALIGNANT NEOPLASM OF UPPER-INNER QUADRANT OF LEFT BREAST IN FEMALE, ESTROGEN RECEPTOR POSITIVE (HCC): ICD-10-CM

## 2023-10-04 PROCEDURE — 700111 HCHG RX REV CODE 636 W/ 250 OVERRIDE (IP): Performed by: NURSE PRACTITIONER

## 2023-10-04 PROCEDURE — A4212 NON CORING NEEDLE OR STYLET: HCPCS

## 2023-10-04 PROCEDURE — 96523 IRRIG DRUG DELIVERY DEVICE: CPT

## 2023-10-04 RX ADMIN — HEPARIN 500 UNITS: 100 SYRINGE at 11:05

## 2023-10-04 NOTE — PROGRESS NOTES
[facility] was motorized scooter,  at side. Pt denies changes to health, medication or allergies. Pt reports having a appointment with MD tomorrow, will discuss taking port out. POC reviewed. Port accessed per protocol, brisk blood return noted. Line flushed, heparinized and de-accessed/ tip intact/ sterile gauze and tegaderm applied, Pt tolerated well. Future appointment pending scheduling, e-mail sen. Pt agrees to cancel appointment if port is removed,and Donna was discharged home in no acute distress.

## 2023-10-05 ENCOUNTER — HOSPITAL ENCOUNTER (OUTPATIENT)
Dept: HEMATOLOGY ONCOLOGY | Facility: MEDICAL CENTER | Age: 73
End: 2023-10-05
Attending: INTERNAL MEDICINE
Payer: MEDICARE

## 2023-10-05 VITALS
TEMPERATURE: 98 F | DIASTOLIC BLOOD PRESSURE: 64 MMHG | SYSTOLIC BLOOD PRESSURE: 102 MMHG | BODY MASS INDEX: 38.52 KG/M2 | HEART RATE: 95 BPM | OXYGEN SATURATION: 94 % | RESPIRATION RATE: 17 BRPM | HEIGHT: 64 IN

## 2023-10-05 DIAGNOSIS — C50.212 MALIGNANT NEOPLASM OF UPPER-INNER QUADRANT OF LEFT BREAST IN FEMALE, ESTROGEN RECEPTOR POSITIVE (HCC): ICD-10-CM

## 2023-10-05 DIAGNOSIS — Z17.0 MALIGNANT NEOPLASM OF UPPER-INNER QUADRANT OF LEFT BREAST IN FEMALE, ESTROGEN RECEPTOR POSITIVE (HCC): ICD-10-CM

## 2023-10-05 PROCEDURE — 99212 OFFICE O/P EST SF 10 MIN: CPT | Performed by: INTERNAL MEDICINE

## 2023-10-05 PROCEDURE — 99213 OFFICE O/P EST LOW 20 MIN: CPT | Performed by: INTERNAL MEDICINE

## 2023-10-05 RX ORDER — ANASTROZOLE 1 MG/1
1 TABLET ORAL DAILY
Qty: 90 TABLET | Refills: 1 | Status: SHIPPED | OUTPATIENT
Start: 2023-10-05 | End: 2024-03-21

## 2023-10-05 ASSESSMENT — ENCOUNTER SYMPTOMS
MYALGIAS: 0
CONSTIPATION: 0
COUGH: 0
VOMITING: 0
FEVER: 0
HEADACHES: 1
PALPITATIONS: 0
WEIGHT LOSS: 0
DIZZINESS: 0
NAUSEA: 0
SHORTNESS OF BREATH: 0
CHILLS: 0
DIARRHEA: 0
TINGLING: 0

## 2023-10-05 ASSESSMENT — PAIN SCALES - GENERAL: PAINLEVEL: NO PAIN

## 2023-10-05 NOTE — PROGRESS NOTES
Subjective   Medical oncology visit: 10/5/2023  Donna Palmer is a 72 y.o. female who presents with ER positive breast cancer receiving adjuvant chemotherapy with AC          HPI    Referring Physician: Lubna Donaldson MD  Primary Care:   ASA Gallego.     Diagnosis: Ductal carcinoma of the left breast, grade 3, clinically stage IIa (cT2, cN0) ER +95%, UT negative, HER2 IHC 2+ FISH negative, Ki-67 30%     Chief Complaint: Patient seen today for evaluation of her breast cancer, for continued monitoring of symptoms and side effects of cancer treatments, employing AC.     Oncology history of presenting illness:  Patient self detected a mass in her left breast in February 2023.  She had not had a mammogram for several years.  A diagnostic mammogram and ultrasound were completed on 3/6/2023 which showed a 2.7 x 2.4 x 2.0 cm spiculated irregular mass at 3 o'clock position of the left breast with no abnormal lymph nodes being detected.  She underwent an immediate ultrasound-guided biopsy demonstrated invasive ductal carcinoma, grade 3, at least 1.7 cm in the core, ER +95%, UT negative, Ki-67 30%, HER2 2+ IHC FISH negative.  She saw Dr. Donaldson in consultation and is planning to undergo partial mastectomy and sentinel lymph node biopsy.     Her history is notable for post polio syndrome with motor nerve impairment which is gotten gradually worse over the last couple of years.  She now has to use a scooter almost all the time and has started having impairment in activities of daily living.  Otherwise she has no chronic medical problems.  She has a maternal aunt who had breast cancer.  No other breast or ovarian cancer is noted.     Interval histories:  Interval history 4/20/2023: She underwent left partial mastectomy and sentinel lymph node biopsy on 4/4/2023.  Pathology demonstrated invasive ductal carcinoma 3.5 cm in greatest dimension with focal high-grade DCIS.  Margins were clear and there was no lymph  vascular invasion.  1 sentinel lymph node was positive for micrometastases and 1 sentinel lymph node was negative.  Postoperative course was unremarkable but she does feel a firm mass at the incision site which is consistent with a seroma in the breast.  We sent an Oncotype DX which came back with recurrence score of 39.  She has no history of heart problems but does have history of hypertension.     Interval history 05/10/23 (CAlsop, APRN):  Patient seen today for her toxicity check following her first dose of chemotherapy employing AC.  Patient appeared to tolerate treatment very well.  She has not required any antinausea medications.  She had very mild underlying nausea but again no need for medication.  She does complain of constipation which is new with treatment.  She did have a bowel movement last night.  She was quite constipated after treatment and ended up taking milk of magnesia dose on Sunday night which caused diarrhea.  She also noticed a very mild elevation in temperature but that resolved.  She states her appetite has been good and she is eating well.  She has noticed decrease in energy this past week as well.     Interval history 05/24/23 (CAlsop, APRN):  Patient is apprehensive and scared about cycle 2 but overall she has been doing well.  She stated that her constipation is resolved and she is having normal bowel movements.  She did state that her hair started to fall out so she recently shaved it which she has been dealing with mentally.  Otherwise no other significant clinical complaints.  Derm at the incision site is still present but appears to be decreasing in size.  According to the patient she did reach out to Dr. Donaldson and was informed that there was no concerns at this time.     Interval history 06/14/23 (CAlsop, APRN):  Patient has done well.  She did have what she believed to be a urinary tract infection.  UA was negative for any bacteria but she did have positive leukocytes.  She was  quite symptomatic with pain, burning and pressure with urination, therefore I did treat her with a short course of antibiotics with Macrobid.  She did complete the antibiotics and as of last night her symptoms have completely resolved.  She denies any foul odor or vaginal discharge.  She denies any nausea or vomiting.  Her constipation is well controlled.  She did have a headache for just a few days after treatment but otherwise no other complaints.    Interval history 7/5/2023: She comes in for her fourth and final cycle of Adriamycin and cyclophosphamide.  Overall she has done well with it.  She has about a week of feeling Malays and puny after treatment.  She has minimal nausea and has not taken any antiemetics after her premeds.  She did get a headache again but it was better than the previous time.  She does not wish to change her antiemetic regimen.  She had symptoms of urinary tract infection but a negative UA after the last cycle again and this is likely mucosal irritation.  She will be seeing radiation next week.  She has generalized weakness but no new focal motor weakness.    Interval history 8/9/2023: She is getting radiation therapy and is about residential done with a 15-day treatment.  She remains fatigued from the chemotherapy but her urinary symptoms have resolved.  No nausea or vomiting.  She has no increase in her stable motor weakness.  She would like to get her port out before the end of the year.  Her labs are stable and do not require frequent checking now.    Interval history 10/5/2023: She tolerated radiation therapy well with moderate erythema and some mild desquamation which is healing.  She has mild fatigue which is also resolving.  She is ready to initiate endocrine therapy.  Her hair is growing back slowly.    Treatment history:  04/04/23: Left partial mastectomy and sentinel lymph node biopsy - Dr. Donaldson  05/03/23: C1D1 Adriamycin and Cyclophosphamide  05/24/23: C2D1 Adriamycin and  Cyclophosphamide  06/14/23: C3D1 Adriamycin and Cyclophosphamide  7/5/2023: C4 D1 Adriamycin and cyclophosphamide      Allergies   Allergen Reactions    Penicillin G Hives     As a child    Tetracycline Vomiting     Current Outpatient Medications on File Prior to Encounter   Medication Sig Dispense Refill    lidocaine-prilocaine (EMLA) 2.5-2.5 % Cream Apply to port site 1 hour prior to port access as needed daily 30 g 3    Multiple Vitamins-Minerals (MULTIVITAMIN ADULT PO) Take  by mouth.       No current facility-administered medications on file prior to encounter.       Review of Systems   Constitutional:  Negative for chills, fever and weight loss.   Respiratory:  Negative for cough and shortness of breath.    Cardiovascular:  Negative for chest pain and palpitations.   Gastrointestinal:  Negative for constipation, diarrhea, nausea and vomiting.   Genitourinary:  Negative for dysuria.   Musculoskeletal:  Negative for myalgias.   Neurological:  Positive for headaches (slightly for a few days after chemo and then resolves). Negative for dizziness and tingling.              Objective     There were no vitals taken for this visit.     Physical Exam  Vitals reviewed.   Constitutional:       General: She is not in acute distress.     Appearance: Normal appearance. She is not diaphoretic.   HENT:      Head: Normocephalic and atraumatic.   Cardiovascular:      Rate and Rhythm: Normal rate and regular rhythm.      Heart sounds: Normal heart sounds. No murmur heard.     No friction rub. No gallop.   Pulmonary:      Effort: Pulmonary effort is normal. No respiratory distress.      Breath sounds: Normal breath sounds. No wheezing.   Abdominal:      General: Bowel sounds are normal. There is no distension.      Palpations: Abdomen is soft.      Tenderness: There is no abdominal tenderness.   Musculoskeletal:      Comments: Confined to a wheel chair   Skin:     General: Skin is warm and dry.   Neurological:      Mental Status:  She is alert and oriented to person, place, and time.   Psychiatric:         Mood and Affect: Mood normal.         Behavior: Behavior normal.             Latest Reference Range & Units 06/13/23 16:08   WBC 4.8 - 10.8 K/uL 4.8   RBC 4.20 - 5.40 M/uL 4.34   Hemoglobin 12.0 - 16.0 g/dL 12.2   Hematocrit 37.0 - 47.0 % 37.9   MCV 81.4 - 97.8 fL 87.3   MCH 27.0 - 33.0 pg 28.1   MCHC 32.2 - 35.5 g/dL 32.2   RDW 35.9 - 50.0 fL 44.3   Platelet Count 164 - 446 K/uL 371   MPV 9.0 - 12.9 fL 9.5   Neutrophils-Polys 44.00 - 72.00 % 57.70   Neutrophils (Absolute) 1.82 - 7.42 K/uL 2.78   Lymphocytes 22.00 - 41.00 % 19.00 (L)   Lymphs (Absolute) 1.00 - 4.80 K/uL 0.92 (L)   Monocytes 0.00 - 13.40 % 19.00 (H)   Monos (Absolute) 0.00 - 0.85 K/uL 0.92 (H)   Eosinophils 0.00 - 6.90 % 0.60   Eos (Absolute) 0.00 - 0.51 K/uL 0.03   Basophils 0.00 - 1.80 % 1.00   Baso (Absolute) 0.00 - 0.12 K/uL 0.05   Immature Granulocytes 0.00 - 0.90 % 2.70 (H)   Immature Granulocytes (abs) 0.00 - 0.11 K/uL 0.13 (H)   Nucleated RBC 0.00 - 0.20 /100 WBC 0.00   NRBC (Absolute) K/uL 0.00   Outpt Infus CBC Comment  see below   Sodium 135 - 145 mmol/L 143   Potassium 3.6 - 5.5 mmol/L 4.0   Chloride 96 - 112 mmol/L 106   Co2 20 - 33 mmol/L 24   Anion Gap 7.0 - 16.0  13.0   Glucose 65 - 99 mg/dL 123 (H)   Bun 8 - 22 mg/dL 10   Creatinine 0.50 - 1.40 mg/dL 0.56   GFR (CKD-EPI) >60 mL/min/1.73 m 2 98   Calcium 8.5 - 10.5 mg/dL 9.2   Correct Calcium 8.5 - 10.5 mg/dL 9.3   AST(SGOT) 12 - 45 U/L 27   ALT(SGPT) 2 - 50 U/L 22   Alkaline Phosphatase 30 - 99 U/L 76   Total Bilirubin 0.1 - 1.5 mg/dL 0.2   Albumin 3.2 - 4.9 g/dL 3.9   Total Protein 6.0 - 8.2 g/dL 6.3   Globulin 1.9 - 3.5 g/dL 2.4   A-G Ratio g/dL 1.6              Assessment & Plan   1. Malignant neoplasm of upper-inner quadrant of left breast in female, estrogen receptor positive (HCC)               Impression:  1.  Invasive ductal carcinoma of the left breast, grade 3, clinically stage IIa (cT2, cN0) ER  +95%, ID negative, HER2 IHC 2+ FISH negative, Ki-67 30%.  Status post left partial mastectomy and sentinel lymph node biopsy for invasive ductal carcinoma, grade 3, stage IIb (pT2, PN 1 LEXI).  Oncotype DX recurrence score 39.  She is a good candidate for adjuvant chemotherapy.  Due to her postpolio syndrome AC x 4 was given as adjuvant chemotherapy to avoid taxanes and was reasonably well-tolerated.  Status post radiation therapy to the whole breast to initiate endocrine therapy with anastrozole   2.  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  Stable.  3.  Constipation improved  4.  Headaches after chemotherapy resolved    Plan:  Start anastrozole.  We will see her back in 8 weeks to assess her initial tolerance of therapy.    Please note that this dictation was created using voice recognition software. I have made every reasonable attempt to correct obvious errors, but I expect that there are errors of grammar and possibly content that I did not discover before finalizing the note.

## 2023-10-09 ENCOUNTER — APPOINTMENT (OUTPATIENT)
Dept: ADMISSIONS | Facility: MEDICAL CENTER | Age: 73
End: 2023-10-09
Attending: INTERNAL MEDICINE
Payer: MEDICARE

## 2023-10-17 ENCOUNTER — PRE-ADMISSION TESTING (OUTPATIENT)
Dept: ADMISSIONS | Facility: MEDICAL CENTER | Age: 73
End: 2023-10-17
Attending: INTERNAL MEDICINE
Payer: MEDICARE

## 2023-10-23 RX ORDER — 0.9 % SODIUM CHLORIDE 0.9 %
20 VIAL (ML) INJECTION PRN
Status: CANCELLED | OUTPATIENT
Start: 2023-11-01

## 2023-10-24 ENCOUNTER — OFFICE VISIT (OUTPATIENT)
Dept: MEDICAL GROUP | Facility: PHYSICIAN GROUP | Age: 73
End: 2023-10-24
Payer: MEDICARE

## 2023-10-24 VITALS
OXYGEN SATURATION: 94 % | HEIGHT: 64 IN | HEART RATE: 100 BPM | DIASTOLIC BLOOD PRESSURE: 66 MMHG | SYSTOLIC BLOOD PRESSURE: 104 MMHG | TEMPERATURE: 99 F | BODY MASS INDEX: 37.56 KG/M2 | WEIGHT: 220 LBS

## 2023-10-24 DIAGNOSIS — Z02.89 ENCOUNTER FOR COMPLETION OF FORM WITH PATIENT: ICD-10-CM

## 2023-10-24 DIAGNOSIS — G14 POSTPOLIOMYELITIS SYNDROME: ICD-10-CM

## 2023-10-24 DIAGNOSIS — M25.562 ACUTE PAIN OF LEFT KNEE: ICD-10-CM

## 2023-10-24 PROCEDURE — 99213 OFFICE O/P EST LOW 20 MIN: CPT

## 2023-10-24 PROCEDURE — 3078F DIAST BP <80 MM HG: CPT

## 2023-10-24 PROCEDURE — 3074F SYST BP LT 130 MM HG: CPT

## 2023-10-24 ASSESSMENT — FIBROSIS 4 INDEX: FIB4 SCORE: 1.48

## 2023-10-24 NOTE — ASSESSMENT & PLAN NOTE
Acute, unstable. Reports instability and aching of left knee. Thinks this is related to postpolio syndrome. Using wheel chair, walker for transition, associated foot drop, and weakness.  Using recumbent bike for strength building.   Declines pharmacotherapy  Will continue with conservative measures.

## 2023-10-24 NOTE — ASSESSMENT & PLAN NOTE
Chronic, ongoing. Requesting RTC pararide transportation paperwork to be filled out for assistance getting transportation when spouse unavailable. Given physical disability, and assistance required with wheel chair this is reasonable.

## 2023-10-24 NOTE — PROGRESS NOTES
"Subjective:     CC: Diagnoses of Encounter for completion of form with patient, Postpoliomyelitis syndrome, and Acute pain of left knee were pertinent to this visit.    HPI:   Donna presents today with    Problem   Acute Pain of Left Knee   Postpoliomyelitis Syndrome     ROS:  Review of Systems   Musculoskeletal:  Positive for joint pain (left knee pain).   All other systems reviewed and are negative.      Objective:     Exam:  /66 (BP Location: Right arm, Patient Position: Sitting, BP Cuff Size: Large adult)   Pulse 100   Temp 37.2 °C (99 °F) (Temporal)   Ht 1.626 m (5' 4\")   Wt 99.8 kg (220 lb) Comment: per pt wheelchair  SpO2 94%   BMI 37.76 kg/m²  Body mass index is 37.76 kg/m².    Physical Exam  Vitals reviewed.   Constitutional:       General: She is not in acute distress.     Appearance: Normal appearance. She is not ill-appearing.   HENT:      Head: Normocephalic and atraumatic.   Cardiovascular:      Rate and Rhythm: Normal rate.      Pulses: Normal pulses.   Pulmonary:      Effort: Pulmonary effort is normal. No respiratory distress.   Skin:     General: Skin is warm and dry.      Findings: No rash.   Neurological:      General: No focal deficit present.      Mental Status: She is alert and oriented to person, place, and time.   Psychiatric:         Mood and Affect: Mood normal.         Behavior: Behavior normal.       Assessment & Plan:     72 y.o. female with the following -     Problem List Items Addressed This Visit       Postpoliomyelitis syndrome     Chronic, ongoing. Requesting RTC pararide transportation paperwork to be filled out for assistance getting transportation when spouse unavailable. Given physical disability, and assistance required with wheel chair this is reasonable.         Acute pain of left knee     Acute, unstable. Reports instability and aching of left knee. Thinks this is related to postpolio syndrome. Using wheel chair, walker for transition, associated foot drop, " and weakness.  Using recumbent bike for strength building.   Declines pharmacotherapy  Will continue with conservative measures.          Other Visit Diagnoses       Encounter for completion of form with patient              Patient was educated in proper administration of medication(s) ordered today including safety, possible SE, risks, benefits, rationale and alternatives to therapy.   Supportive care, differential diagnoses, and indications for immediate follow-up discussed with patient.    Pathogenesis of diagnosis discussed including typical length and natural progression.    Instructed to return to clinic or nearest emergency department for any change in condition, further concerns, or worsening of symptoms.  Patient states understanding of the plan of care and discharge instructions.    Return if symptoms worsen or fail to improve.    Please note that this dictation was created using voice recognition software. I have made every reasonable attempt to correct obvious errors, but I expect that there are errors of grammar and possibly content that I did not discover before finalizing the note.

## 2023-11-01 ENCOUNTER — OUTPATIENT INFUSION SERVICES (OUTPATIENT)
Dept: ONCOLOGY | Facility: MEDICAL CENTER | Age: 73
End: 2023-11-01
Attending: INTERNAL MEDICINE
Payer: MEDICARE

## 2023-11-01 VITALS
RESPIRATION RATE: 18 BRPM | TEMPERATURE: 97.9 F | SYSTOLIC BLOOD PRESSURE: 126 MMHG | OXYGEN SATURATION: 97 % | HEART RATE: 96 BPM | DIASTOLIC BLOOD PRESSURE: 80 MMHG

## 2023-11-01 DIAGNOSIS — C50.212 MALIGNANT NEOPLASM OF UPPER-INNER QUADRANT OF LEFT BREAST IN FEMALE, ESTROGEN RECEPTOR POSITIVE (HCC): ICD-10-CM

## 2023-11-01 DIAGNOSIS — Z17.0 MALIGNANT NEOPLASM OF UPPER-INNER QUADRANT OF LEFT BREAST IN FEMALE, ESTROGEN RECEPTOR POSITIVE (HCC): ICD-10-CM

## 2023-11-01 PROCEDURE — A4212 NON CORING NEEDLE OR STYLET: HCPCS

## 2023-11-01 PROCEDURE — 700111 HCHG RX REV CODE 636 W/ 250 OVERRIDE (IP): Performed by: NURSE PRACTITIONER

## 2023-11-01 PROCEDURE — 96523 IRRIG DRUG DELIVERY DEVICE: CPT

## 2023-11-01 RX ADMIN — HEPARIN 500 UNITS: 100 SYRINGE at 08:14

## 2023-11-01 NOTE — PROGRESS NOTES
[facility]. Denies having any labs to draw. Reports feeling well. Right chest port in place. EMLA appied over port site. Port accessed in sterile manner, brisk blood return observed, flushed per policy, heparinized and de-accessed with needle intact, gauze dressing placed. Donna stated that she will have her port removed on November 7th. She stated she will cancel her future appointments. Pt discharged to home in good condition with family.

## 2023-11-07 ENCOUNTER — APPOINTMENT (OUTPATIENT)
Dept: RADIOLOGY | Facility: MEDICAL CENTER | Age: 73
End: 2023-11-07
Attending: INTERNAL MEDICINE
Payer: MEDICARE

## 2023-11-07 ENCOUNTER — HOSPITAL ENCOUNTER (OUTPATIENT)
Facility: MEDICAL CENTER | Age: 73
End: 2023-11-07
Attending: INTERNAL MEDICINE | Admitting: STUDENT IN AN ORGANIZED HEALTH CARE EDUCATION/TRAINING PROGRAM
Payer: MEDICARE

## 2023-11-07 VITALS
HEIGHT: 64 IN | RESPIRATION RATE: 21 BRPM | OXYGEN SATURATION: 95 % | BODY MASS INDEX: 37.56 KG/M2 | HEART RATE: 81 BPM | SYSTOLIC BLOOD PRESSURE: 139 MMHG | WEIGHT: 220 LBS | DIASTOLIC BLOOD PRESSURE: 63 MMHG

## 2023-11-07 DIAGNOSIS — C50.212 MALIGNANT NEOPLASM OF UPPER-INNER QUADRANT OF LEFT BREAST IN FEMALE, ESTROGEN RECEPTOR POSITIVE (HCC): ICD-10-CM

## 2023-11-07 DIAGNOSIS — Z17.0 MALIGNANT NEOPLASM OF UPPER-INNER QUADRANT OF LEFT BREAST IN FEMALE, ESTROGEN RECEPTOR POSITIVE (HCC): ICD-10-CM

## 2023-11-07 PROCEDURE — 36590 REMOVAL TUNNELED CV CATH: CPT

## 2023-11-07 PROCEDURE — 77001 FLUOROGUIDE FOR VEIN DEVICE: CPT

## 2023-11-07 RX ORDER — LIDOCAINE HCL/EPINEPHRINE/PF 2%-1:200K
VIAL (ML) INJECTION
Status: DISCONTINUED
Start: 2023-11-07 | End: 2023-11-07 | Stop reason: HOSPADM

## 2023-11-07 ASSESSMENT — FIBROSIS 4 INDEX: FIB4 SCORE: 1.48

## 2023-11-07 NOTE — DISCHARGE INSTR - WOUND CARE
Monitor for signs & symptoms of infection. Redness/swelling, foul smelling drainage, and fever  Keep dressing dry & intact. Can remove in 24 hours.   Ok to take shower in 24 hours. Just keep site dry. Cover before showering.   No hot tubs, swimming or do note submerge in water for 7 days.

## 2023-11-07 NOTE — PROGRESS NOTES
Patient arrived to [facility]  IR suite, alert and oriented. Consented for portacath removal wt Dr Restrepo. Pt transferred to table without incident. Connected to continuous cardiac monitor, NIBP and pulse ox. Right upper chest prepped and draped sterilely. Right upper chest incision closure (sutures, dermabond, gauze and tegadem). Dressing D/I. Discharge instructions explained to pt and . Understanding verbalized. Pt left [facility] via motorized scooter & accompanied by .

## 2023-11-08 DIAGNOSIS — C50.212 MALIGNANT NEOPLASM OF UPPER-INNER QUADRANT OF LEFT BREAST IN FEMALE, ESTROGEN RECEPTOR POSITIVE (HCC): ICD-10-CM

## 2023-11-08 DIAGNOSIS — Z17.0 MALIGNANT NEOPLASM OF UPPER-INNER QUADRANT OF LEFT BREAST IN FEMALE, ESTROGEN RECEPTOR POSITIVE (HCC): ICD-10-CM

## 2023-11-08 NOTE — NON-PROVIDER
Patient called to notify of palpating new lump on left breast. Updated Dr. Zaman, verbal orders for Left breast US and Left diagnostic mammo, orders place. Patient updated.

## 2023-11-14 ENCOUNTER — HOSPITAL ENCOUNTER (OUTPATIENT)
Dept: RADIOLOGY | Facility: MEDICAL CENTER | Age: 73
End: 2023-11-14
Attending: INTERNAL MEDICINE
Payer: MEDICARE

## 2023-11-14 DIAGNOSIS — C50.212 MALIGNANT NEOPLASM OF UPPER-INNER QUADRANT OF LEFT BREAST IN FEMALE, ESTROGEN RECEPTOR POSITIVE (HCC): ICD-10-CM

## 2023-11-14 DIAGNOSIS — Z17.0 MALIGNANT NEOPLASM OF UPPER-INNER QUADRANT OF LEFT BREAST IN FEMALE, ESTROGEN RECEPTOR POSITIVE (HCC): ICD-10-CM

## 2023-11-14 PROCEDURE — G0279 TOMOSYNTHESIS, MAMMO: HCPCS

## 2023-11-14 PROCEDURE — 76642 ULTRASOUND BREAST LIMITED: CPT | Mod: LT

## 2023-11-22 ENCOUNTER — HOSPITAL ENCOUNTER (OUTPATIENT)
Dept: RADIOLOGY | Facility: MEDICAL CENTER | Age: 73
End: 2023-11-22
Attending: INTERNAL MEDICINE
Payer: MEDICARE

## 2023-11-22 DIAGNOSIS — R92.8 ABNORMAL FINDINGS ON DIAGNOSTIC IMAGING OF BREAST: ICD-10-CM

## 2023-11-22 LAB — PATHOLOGY CONSULT NOTE: NORMAL

## 2023-11-22 PROCEDURE — A4648 IMPLANTABLE TISSUE MARKER: HCPCS

## 2023-11-22 PROCEDURE — 88305 TISSUE EXAM BY PATHOLOGIST: CPT

## 2023-11-22 PROCEDURE — 77065 DX MAMMO INCL CAD UNI: CPT | Mod: LT

## 2023-11-27 ENCOUNTER — TELEPHONE (OUTPATIENT)
Dept: RADIOLOGY | Facility: MEDICAL CENTER | Age: 73
End: 2023-11-27
Payer: MEDICARE

## 2023-11-29 ENCOUNTER — APPOINTMENT (OUTPATIENT)
Dept: ONCOLOGY | Facility: MEDICAL CENTER | Age: 73
End: 2023-11-29
Attending: INTERNAL MEDICINE
Payer: MEDICARE

## 2023-12-13 ENCOUNTER — HOSPITAL ENCOUNTER (OUTPATIENT)
Dept: LAB | Facility: MEDICAL CENTER | Age: 73
End: 2023-12-13
Payer: MEDICARE

## 2023-12-13 ENCOUNTER — HOSPITAL ENCOUNTER (OUTPATIENT)
Dept: HEMATOLOGY ONCOLOGY | Facility: MEDICAL CENTER | Age: 73
End: 2023-12-13
Attending: INTERNAL MEDICINE
Payer: MEDICARE

## 2023-12-13 VITALS
WEIGHT: 220 LBS | HEART RATE: 87 BPM | TEMPERATURE: 98.4 F | HEIGHT: 64 IN | RESPIRATION RATE: 16 BRPM | DIASTOLIC BLOOD PRESSURE: 64 MMHG | OXYGEN SATURATION: 94 % | SYSTOLIC BLOOD PRESSURE: 110 MMHG | BODY MASS INDEX: 37.56 KG/M2

## 2023-12-13 DIAGNOSIS — C50.212 MALIGNANT NEOPLASM OF UPPER-INNER QUADRANT OF LEFT BREAST IN FEMALE, ESTROGEN RECEPTOR POSITIVE (HCC): ICD-10-CM

## 2023-12-13 DIAGNOSIS — Z17.0 MALIGNANT NEOPLASM OF UPPER-INNER QUADRANT OF LEFT BREAST IN FEMALE, ESTROGEN RECEPTOR POSITIVE (HCC): ICD-10-CM

## 2023-12-13 DIAGNOSIS — Z79.811 LONG TERM (CURRENT) USE OF AROMATASE INHIBITORS: ICD-10-CM

## 2023-12-13 DIAGNOSIS — R73.09 ELEVATED GLUCOSE LEVEL: ICD-10-CM

## 2023-12-13 DIAGNOSIS — E66.01 MORBID OBESITY WITH BMI OF 40.0-44.9, ADULT (HCC): ICD-10-CM

## 2023-12-13 LAB
EST. AVERAGE GLUCOSE BLD GHB EST-MCNC: 134 MG/DL
HBA1C MFR BLD: 6.3 % (ref 4–5.6)

## 2023-12-13 PROCEDURE — 36415 COLL VENOUS BLD VENIPUNCTURE: CPT

## 2023-12-13 PROCEDURE — 80061 LIPID PANEL: CPT

## 2023-12-13 PROCEDURE — 99212 OFFICE O/P EST SF 10 MIN: CPT | Performed by: INTERNAL MEDICINE

## 2023-12-13 PROCEDURE — 99213 OFFICE O/P EST LOW 20 MIN: CPT | Performed by: INTERNAL MEDICINE

## 2023-12-13 PROCEDURE — 83036 HEMOGLOBIN GLYCOSYLATED A1C: CPT | Mod: GA

## 2023-12-13 ASSESSMENT — ENCOUNTER SYMPTOMS
FEVER: 0
WEIGHT LOSS: 0
SHORTNESS OF BREATH: 0
CHILLS: 0
DIZZINESS: 0
TINGLING: 0
MYALGIAS: 0
NAUSEA: 0
PALPITATIONS: 0
COUGH: 0
VOMITING: 0
CONSTIPATION: 0
DIARRHEA: 0
HEADACHES: 1

## 2023-12-13 ASSESSMENT — FIBROSIS 4 INDEX: FIB4 SCORE: 1.5

## 2023-12-13 ASSESSMENT — PAIN SCALES - GENERAL: PAINLEVEL: NO PAIN

## 2023-12-13 NOTE — PROGRESS NOTES
Subjective   Medical oncology visit: 10/5/2023  Donna Palmer is a 72 y.o. female who presents with ER positive breast cancer receiving adjuvant chemotherapy with AC          HPI    Referring Physician: Lubna Donaldson MD  Primary Care:   [facility]leg[facility].     Diagnosis: Ductal carcinoma of the left breast, grade 3, clinically stage IIa (cT2, cN0) ER +95%, WI negative, HER2 IHC 2+ FISH negative, Ki-67 30%     Chief Complaint: Patient seen today for evaluation of her breast cancer, for continued monitoring of symptoms and side effects of cancer treatments, employing AC.     Oncology history of presenting illness:  Patient self detected a mass in her left breast in February 2023.  She had not had a mammogram for several years.  A diagnostic mammogram and ultrasound were completed on 3/6/2023 which showed a 2.7 x 2.4 x 2.0 cm spiculated irregular mass at 3 o'clock position of the left breast with no abnormal lymph nodes being detected.  She underwent an immediate ultrasound-guided biopsy demonstrated invasive ductal carcinoma, grade 3, at least 1.7 cm in the core, ER +95%, WI negative, Ki-67 30%, HER2 2+ IHC FISH negative.  She saw Dr. Donaldson in consultation and is planning to undergo partial mastectomy and sentinel lymph node biopsy.     Her history is notable for post polio syndrome with motor nerve impairment which is gotten gradually worse over the last couple of years.  She now has to use a scooter almost all the time and has started having impairment in activities of daily living.  Otherwise she has no chronic medical problems.  She has a maternal aunt who had breast cancer.  No other breast or ovarian cancer is noted.     Interval histories:  Interval history 4/20/2023: She underwent left partial mastectomy and sentinel lymph node biopsy on 4/4/2023.  Pathology demonstrated invasive ductal carcinoma 3.5 cm in greatest dimension with focal high-grade DCIS.  Margins were clear and there was no lymph  vascular invasion.  1 sentinel lymph node was positive for micrometastases and 1 sentinel lymph node was negative.  Postoperative course was unremarkable but she does feel a firm mass at the incision site which is consistent with a seroma in the breast.  We sent an Oncotype DX which came back with recurrence score of 39.  She has no history of heart problems but does have history of hypertension.     Interval history 05/10/23 (CAlsop, APRN):  Patient seen today for her toxicity check following her first dose of chemotherapy employing AC.  Patient appeared to tolerate treatment very well.  She has not required any antinausea medications.  She had very mild underlying nausea but again no need for medication.  She does complain of constipation which is new with treatment.  She did have a bowel movement last night.  She was quite constipated after treatment and ended up taking milk of magnesia dose on Sunday night which caused diarrhea.  She also noticed a very mild elevation in temperature but that resolved.  She states her appetite has been good and she is eating well.  She has noticed decrease in energy this past week as well.     Interval history 05/24/23 (CAlsop, APRN):  Patient is apprehensive and scared about cycle 2 but overall she has been doing well.  She stated that her constipation is resolved and she is having normal bowel movements.  She did state that her hair started to fall out so she recently shaved it which she has been dealing with mentally.  Otherwise no other significant clinical complaints.  Derm at the incision site is still present but appears to be decreasing in size.  According to the patient she did reach out to Dr. Donaldson and was informed that there was no concerns at this time.     Interval history 06/14/23 (CAlsop, APRN):  Patient has done well.  She did have what she believed to be a urinary tract infection.  UA was negative for any bacteria but she did have positive leukocytes.  She was  quite symptomatic with pain, burning and pressure with urination, therefore I did treat her with a short course of antibiotics with Macrobid.  She did complete the antibiotics and as of last night her symptoms have completely resolved.  She denies any foul odor or vaginal discharge.  She denies any nausea or vomiting.  Her constipation is well controlled.  She did have a headache for just a few days after treatment but otherwise no other complaints.    Interval history 7/5/2023: She comes in for her fourth and final cycle of Adriamycin and cyclophosphamide.  Overall she has done well with it.  She has about a week of feeling Malays and puny after treatment.  She has minimal nausea and has not taken any antiemetics after her premeds.  She did get a headache again but it was better than the previous time.  She does not wish to change her antiemetic regimen.  She had symptoms of urinary tract infection but a negative UA after the last cycle again and this is likely mucosal irritation.  She will be seeing radiation next week.  She has generalized weakness but no new focal motor weakness.    Interval history 8/9/2023: She is getting radiation therapy and is about snf done with a 15-day treatment.  She remains fatigued from the chemotherapy but her urinary symptoms have resolved.  No nausea or vomiting.  She has no increase in her stable motor weakness.  She would like to get her port out before the end of the year.  Her labs are stable and do not require frequent checking now.    Interval history 10/5/2023: She tolerated radiation therapy well with moderate erythema and some mild desquamation which is healing.  She has mild fatigue which is also resolving.  She is ready to initiate endocrine therapy.  Her hair is growing back slowly.    Interval history 12/13/2023: She noticed a lump in the left breast in November which got a little larger.  She had a mammogram on 11/14/2023 which showed hypoechoic lesion with  irregular margins and ultrasound guided biopsy was recommended.  Pathology revealed fat necrosis.  Subsequently she has felt well with no new problems.  She started anastrozole and is tolerating it very well with minimal hot flashes and no night sweats and no musculoskeletal symptoms of note.  She is gone over the effects of chemotherapy except for taste which has not come back fully yet.  Her hair continues to grow and slowly.  She has not had a bone density in several years.    Treatment history:  04/04/23: Left partial mastectomy and sentinel lymph node biopsy - Dr. Donaldson  05/03/23: C1D1 Adriamycin and Cyclophosphamide  05/24/23: C2D1 Adriamycin and Cyclophosphamide  06/14/23: C3D1 Adriamycin and Cyclophosphamide  7/5/2023: C4 D1 Adriamycin and cyclophosphamide      Allergies   Allergen Reactions    Penicillin G Hives     As a child    Tetracycline Vomiting     Current Outpatient Medications on File Prior to Encounter   Medication Sig Dispense Refill    anastrozole (ARIMIDEX) 1 MG Tab Take 1 Tablet by mouth every day. 90 Tablet 1    Multiple Vitamins-Minerals (MULTIVITAMIN ADULT PO) Take  by mouth.      lidocaine-prilocaine (EMLA) 2.5-2.5 % Cream Apply to port site 1 hour prior to port access as needed daily 30 g 3     No current facility-administered medications on file prior to encounter.       Review of Systems   Constitutional:  Negative for chills, fever and weight loss.   Respiratory:  Negative for cough and shortness of breath.    Cardiovascular:  Negative for chest pain and palpitations.   Gastrointestinal:  Negative for constipation, diarrhea, nausea and vomiting.   Genitourinary:  Negative for dysuria.   Musculoskeletal:  Negative for myalgias.   Neurological:  Positive for headaches (slightly for a few days after chemo and then resolves). Negative for dizziness and tingling.              Objective     /64 (BP Location: Right arm, Patient Position: Sitting)   Pulse 87   Temp 36.9 °C (98.4 °F)  "(Temporal)   Resp 16   Ht 1.626 m (5' 4\")   Wt 99.8 kg (220 lb) Comment: patient reported, did not want to stand on scale  SpO2 94%   BMI 37.76 kg/m²      Physical Exam  Vitals reviewed.   Constitutional:       General: She is not in acute distress.     Appearance: Normal appearance. She is not diaphoretic.   HENT:      Head: Normocephalic and atraumatic.   Cardiovascular:      Rate and Rhythm: Normal rate and regular rhythm.      Heart sounds: Normal heart sounds. No murmur heard.     No friction rub. No gallop.   Pulmonary:      Effort: Pulmonary effort is normal. No respiratory distress.      Breath sounds: Normal breath sounds. No wheezing.   Abdominal:      General: Bowel sounds are normal. There is no distension.      Palpations: Abdomen is soft.      Tenderness: There is no abdominal tenderness.   Musculoskeletal:      Comments: Confined to a wheel chair   Skin:     General: Skin is warm and dry.   Neurological:      Mental Status: She is alert and oriented to person, place, and time.   Psychiatric:         Mood and Affect: Mood normal.         Behavior: Behavior normal.               Latest Reference Range & Units 06/13/23 16:08   WBC 4.8 - 10.8 K/uL 4.8   RBC 4.20 - 5.40 M/uL 4.34   Hemoglobin 12.0 - 16.0 g/dL 12.2   Hematocrit 37.0 - 47.0 % 37.9   MCV 81.4 - 97.8 fL 87.3   MCH 27.0 - 33.0 pg 28.1   MCHC 32.2 - 35.5 g/dL 32.2   RDW 35.9 - 50.0 fL 44.3   Platelet Count 164 - 446 K/uL 371   MPV 9.0 - 12.9 fL 9.5   Neutrophils-Polys 44.00 - 72.00 % 57.70   Neutrophils (Absolute) 1.82 - 7.42 K/uL 2.78   Lymphocytes 22.00 - 41.00 % 19.00 (L)   Lymphs (Absolute) 1.00 - 4.80 K/uL 0.92 (L)   Monocytes 0.00 - 13.40 % 19.00 (H)   Monos (Absolute) 0.00 - 0.85 K/uL 0.92 (H)   Eosinophils 0.00 - 6.90 % 0.60   Eos (Absolute) 0.00 - 0.51 K/uL 0.03   Basophils 0.00 - 1.80 % 1.00   Baso (Absolute) 0.00 - 0.12 K/uL 0.05   Immature Granulocytes 0.00 - 0.90 % 2.70 (H)   Immature Granulocytes (abs) 0.00 - 0.11 K/uL 0.13 " (H)   Nucleated RBC 0.00 - 0.20 /100 WBC 0.00   NRBC (Absolute) K/uL 0.00   Outpt Infus CBC Comment  see below   Sodium 135 - 145 mmol/L 143   Potassium 3.6 - 5.5 mmol/L 4.0   Chloride 96 - 112 mmol/L 106   Co2 20 - 33 mmol/L 24   Anion Gap 7.0 - 16.0  13.0   Glucose 65 - 99 mg/dL 123 (H)   Bun 8 - 22 mg/dL 10   Creatinine 0.50 - 1.40 mg/dL 0.56   GFR (CKD-EPI) >60 mL/min/1.73 m 2 98   Calcium 8.5 - 10.5 mg/dL 9.2   Correct Calcium 8.5 - 10.5 mg/dL 9.3   AST(SGOT) 12 - 45 U/L 27   ALT(SGPT) 2 - 50 U/L 22   Alkaline Phosphatase 30 - 99 U/L 76   Total Bilirubin 0.1 - 1.5 mg/dL 0.2   Albumin 3.2 - 4.9 g/dL 3.9   Total Protein 6.0 - 8.2 g/dL 6.3   Globulin 1.9 - 3.5 g/dL 2.4   A-G Ratio g/dL 1.6              Assessment & Plan        Impression:  1.  Invasive ductal carcinoma of the left breast, grade 3, clinically stage IIa (cT2, cN0) ER +95%, WI negative, HER2 IHC 2+ FISH negative, Ki-67 30%.  Status post left partial mastectomy and sentinel lymph node biopsy for invasive ductal carcinoma, grade 3, stage IIb (pT2, PN 1 LEXI).  Oncotype DX recurrence score 39.  She is a good candidate for adjuvant chemotherapy.  Due to her postpolio syndrome AC x 4 was given as adjuvant chemotherapy to avoid taxanes and was reasonably well-tolerated.  Status post radiation therapy to the whole breast, now on anastrozole since October 2023 with excellent tolerance.  2.  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  Stable  3.  Nodule in the left breast postradiation and chemo biopsied fat necrosis.  Stable    Plan: Continue anastrozole.  We will get a baseline bone density.  I will see her back in 6 months for routine follow-up.      Please note that this dictation was created using voice recognition software. I have made every reasonable attempt to correct obvious errors, but I expect that there are errors of grammar and possibly content that I did not discover before finalizing the  note.

## 2023-12-13 NOTE — ADDENDUM NOTE
Encounter addended by: William Hillman, Med Ass't on: 12/13/2023 3:17 PM   Actions taken: Charge Capture section accepted

## 2023-12-14 LAB
CHOLEST SERPL-MCNC: 201 MG/DL (ref 100–199)
FASTING STATUS PATIENT QL REPORTED: NORMAL
HDLC SERPL-MCNC: 58 MG/DL
LDLC SERPL CALC-MCNC: 115 MG/DL
TRIGL SERPL-MCNC: 142 MG/DL (ref 0–149)

## 2023-12-21 ENCOUNTER — OFFICE VISIT (OUTPATIENT)
Dept: MEDICAL GROUP | Facility: PHYSICIAN GROUP | Age: 73
End: 2023-12-21
Payer: MEDICARE

## 2023-12-21 VITALS
WEIGHT: 220 LBS | HEIGHT: 64 IN | HEART RATE: 94 BPM | SYSTOLIC BLOOD PRESSURE: 110 MMHG | DIASTOLIC BLOOD PRESSURE: 60 MMHG | OXYGEN SATURATION: 93 % | BODY MASS INDEX: 37.56 KG/M2 | TEMPERATURE: 98.5 F

## 2023-12-21 DIAGNOSIS — M25.562 ACUTE PAIN OF LEFT KNEE: ICD-10-CM

## 2023-12-21 DIAGNOSIS — R73.03 PREDIABETES: ICD-10-CM

## 2023-12-21 DIAGNOSIS — E78.00 PURE HYPERCHOLESTEROLEMIA: ICD-10-CM

## 2023-12-21 DIAGNOSIS — C50.212 MALIGNANT NEOPLASM OF UPPER-INNER QUADRANT OF LEFT BREAST IN FEMALE, ESTROGEN RECEPTOR POSITIVE (HCC): ICD-10-CM

## 2023-12-21 DIAGNOSIS — N63.11 MASS OF UPPER OUTER QUADRANT OF RIGHT BREAST: ICD-10-CM

## 2023-12-21 DIAGNOSIS — Z17.0 MALIGNANT NEOPLASM OF UPPER-INNER QUADRANT OF LEFT BREAST IN FEMALE, ESTROGEN RECEPTOR POSITIVE (HCC): ICD-10-CM

## 2023-12-21 PROBLEM — I10 PRIMARY HYPERTENSION: Status: RESOLVED | Noted: 2022-08-26 | Resolved: 2023-12-21

## 2023-12-21 PROBLEM — N63.10 LUMP OF RIGHT BREAST: Status: ACTIVE | Noted: 2023-12-21

## 2023-12-21 PROBLEM — E78.5 HYPERLIPIDEMIA: Status: ACTIVE | Noted: 2023-12-21

## 2023-12-21 PROCEDURE — 99214 OFFICE O/P EST MOD 30 MIN: CPT

## 2023-12-21 PROCEDURE — 3074F SYST BP LT 130 MM HG: CPT

## 2023-12-21 PROCEDURE — 3078F DIAST BP <80 MM HG: CPT

## 2023-12-21 ASSESSMENT — FIBROSIS 4 INDEX: FIB4 SCORE: 1.5

## 2023-12-21 NOTE — PROGRESS NOTES
"Subjective:     CC: Diagnoses of Mass of upper outer quadrant of right breast, Malignant neoplasm of upper-inner quadrant of left breast in female, estrogen receptor positive (HCC), Acute pain of left knee, Prediabetes, and Pure hypercholesterolemia were pertinent to this visit.    HPI:   Donna presents today with    Problem   Lump of Right Breast   Prediabetes   Hyperlipidemia   Acute Pain of Left Knee   Malignant Neoplasm of Upper-Inner Quadrant of Left Breast in Female, Estrogen Receptor Positive (Hcc)   Primary Hypertension (Resolved)     ROS:  Review of Systems   Skin:         Lump to right breast   All other systems reviewed and are negative.      Objective:     Exam:  /60 (BP Location: Right arm, Patient Position: Sitting, BP Cuff Size: Small adult)   Pulse 94   Temp 36.9 °C (98.5 °F) (Temporal)   Ht 1.626 m (5' 4\")   Wt 99.8 kg (220 lb) Comment: unable to get weight, per pt  SpO2 93%   BMI 37.76 kg/m²  Body mass index is 37.76 kg/m².    Physical Exam  Vitals reviewed.   Constitutional:       General: She is not in acute distress.     Appearance: Normal appearance. She is not ill-appearing.   HENT:      Head: Normocephalic and atraumatic.      Right Ear: Tympanic membrane, ear canal and external ear normal.      Left Ear: Tympanic membrane, ear canal and external ear normal.   Cardiovascular:      Rate and Rhythm: Normal rate and regular rhythm.      Pulses: Normal pulses.      Heart sounds: Normal heart sounds.   Pulmonary:      Effort: Pulmonary effort is normal. No respiratory distress.      Breath sounds: Normal breath sounds.   Chest:      Chest wall: Mass present.   Breasts:     Right: No tenderness.       Abdominal:      General: Bowel sounds are normal.   Lymphadenopathy:      Cervical: No cervical adenopathy.      Upper Body:      Right upper body: No supraclavicular, axillary or pectoral adenopathy.      Left upper body: No supraclavicular, axillary or pectoral adenopathy.   Skin:     " General: Skin is warm and dry.      Findings: No rash.   Neurological:      General: No focal deficit present.      Mental Status: She is alert and oriented to person, place, and time. Mental status is at baseline.   Psychiatric:         Mood and Affect: Mood normal.         Behavior: Behavior normal.     Labs:    Latest Reference Range & Units 12/13/23 15:30   Glycohemoglobin 4.0 - 5.6 % 6.3 (H)   Estim. Avg Glu mg/dL 134   Fasting Status  Fasting   Cholesterol,Tot 100 - 199 mg/dL 201 (H)   Triglycerides 0 - 149 mg/dL 142   HDL >=40 mg/dL 58   LDL <100 mg/dL 115 (H)   (H): Data is abnormally high    Assessment & Plan:     73 y.o. female with the following -     Problem List Items Addressed This Visit       Malignant neoplasm of upper-inner quadrant of left breast in female, estrogen receptor positive (HCC)     Chronic, stable. Has follow up with oncology q6 months. Taking anastrozole 1 mg daily.          Acute pain of left knee     Chronic, improved. Reports she did try topical nsaid with some relief.           Lump of right breast     Acute, noticed lump last week to right breast. Did have port explant 1 month ago to area just proximal to site of lump. Lump is not painful, oblong in shape, moveable.  Will get US to rule out malignancy/scar tissue         Relevant Orders    US-BREAST LIMITED-RIGHT    Prediabetes     Chronic, unstable. Discussed healthy lifestyle recommendations.   Will plan to recheck A1c 6 months  Positive family history of dm in brother.         Hyperlipidemia     Chronic, unstable. Discussed healthy lifestyle recommendations.   The 10-year ASCVD risk score (Juliet DK, et al., 2019) is: 7.9%  Discussed cholesterol lowering strategy, would like to avoid additional medications at this time.  Plan to reevaluate 6-12 months, consider cholesterol lowering medication.          Patient was educated in proper administration of medication(s) ordered today including safety, possible SE, risks, benefits,  rationale and alternatives to therapy.   Supportive care, differential diagnoses, and indications for immediate follow-up discussed with patient.    Pathogenesis of diagnosis discussed including typical length and natural progression.    Instructed to return to clinic or nearest emergency department for any change in condition, further concerns, or worsening of symptoms.  Patient states understanding of the plan of care and discharge instructions.    Return in about 3 months (around 3/21/2024) for A1C.    Please note that this dictation was created using voice recognition software. I have made every reasonable attempt to correct obvious errors, but I expect that there are errors of grammar and possibly content that I did not discover before finalizing the note.

## 2023-12-21 NOTE — PATIENT INSTRUCTIONS
"Cholesterol-lowering supplements may be helpful  Diet and exercise are proven ways to reduce cholesterol. Cholesterol-lowering supplements may help, too.  By [facility] Staff  If you're worried about your cholesterol level and have started exercising and eating healthier foods, you might wonder if a dietary supplement could help. With your doctor's OK, here are some cholesterol-improving supplements to consider.  Cholesterol-improving supplement What it might do Side effects and drug interactions   Berberine May reduce low-density lipoprotein (LDL, or \"bad\") cholesterol and triglycerides May cause diarrhea, constipation, gas, nausea or vomiting; may cause harm to babies during pregnancy and breastfeeding   Fish oil May reduce triglycerides May cause a fishy aftertaste, bad breath, gas, nausea, vomiting or diarrhea; may interact with some blood-thinning medications   Flaxseed, ground May reduce LDL cholesterol May cause gas, bloating or diarrhea; may interact with some blood-thinning medications   Garlic May slightly reduce cholesterol but studies have been conflicting May cause bad breath, body odor, nausea, vomiting and gas; may interact with some blood-thinning medications   Green tea or green tea extract May lower LDL cholesterol May cause nausea, vomiting, gas or diarrhea; may interact with blood-thinning medications   Niacin May lower LDL cholesterol and triglycerides; may improve high-density lipoprotein (HDL, or \"good\") cholesterol May cause itching and flushing, which are more common at the higher doses usually needed to have an effect on cholesterol   Plant stanols and sterols May reduce LDL cholesterol, particularly in people with a genetic condition that causes high cholesterol (familial hypercholesterolemia) May cause diarrhea   Red yeast rice -- Natural doesn't mean safe  Some red yeast rice products contain a substance (monacolin K) that is chemically identical to the active ingredient in lovastatin " (Altoprev), a prescription medication that lowers cholesterol. Because there is variability in quality from , the amount of monacolin K can vary widely from product to product.  Products that contain monacolin K can cause the same types of side effects as lovastatin, which include damage to the muscles, kidneys and liver. In the [place], the Food and Drug Administration has ruled that dietary supplements that contain more than trace amounts of monacolin K are unapproved drugs and can't be sold legally as dietary supplements.  Dietary supplements may not be enough  While dietary supplements can help, you might also need prescription medications to get your cholesterol numbers to a safe level. Be sure to tell your doctor if you take any type of dietary supplement, because some can interact with medications you may be taking.

## 2023-12-21 NOTE — ASSESSMENT & PLAN NOTE
Acute, noticed lump last week to right breast. Did have port explant 1 month ago to area just proximal to site of lump. Lump is not painful, oblong in shape, moveable.  Will get US to rule out malignancy/scar tissue

## 2023-12-21 NOTE — ASSESSMENT & PLAN NOTE
Chronic, unstable. Discussed healthy lifestyle recommendations.   Will plan to recheck A1c 6 months  Positive family history of dm in brother.

## 2023-12-21 NOTE — ASSESSMENT & PLAN NOTE
Chronic, unstable. Discussed healthy lifestyle recommendations.   The 10-year ASCVD risk score (Juliet VICENTE, et al., 2019) is: 7.9%  Discussed cholesterol lowering strategy, would like to avoid additional medications at this time.  Plan to reevaluate 6-12 months, consider cholesterol lowering medication.

## 2023-12-27 ENCOUNTER — APPOINTMENT (OUTPATIENT)
Dept: ONCOLOGY | Facility: MEDICAL CENTER | Age: 73
End: 2023-12-27
Attending: INTERNAL MEDICINE
Payer: MEDICARE

## 2024-01-18 ENCOUNTER — HOSPITAL ENCOUNTER (OUTPATIENT)
Dept: RADIOLOGY | Facility: MEDICAL CENTER | Age: 74
End: 2024-01-18
Payer: MEDICARE

## 2024-01-18 DIAGNOSIS — N63.11 MASS OF UPPER OUTER QUADRANT OF RIGHT BREAST: ICD-10-CM

## 2024-01-18 PROCEDURE — G0279 TOMOSYNTHESIS, MAMMO: HCPCS

## 2024-01-18 PROCEDURE — 76642 ULTRASOUND BREAST LIMITED: CPT | Mod: RT

## 2024-03-21 DIAGNOSIS — C50.212 MALIGNANT NEOPLASM OF UPPER-INNER QUADRANT OF LEFT BREAST IN FEMALE, ESTROGEN RECEPTOR POSITIVE (HCC): ICD-10-CM

## 2024-03-21 DIAGNOSIS — Z17.0 MALIGNANT NEOPLASM OF UPPER-INNER QUADRANT OF LEFT BREAST IN FEMALE, ESTROGEN RECEPTOR POSITIVE (HCC): ICD-10-CM

## 2024-03-21 RX ORDER — ANASTROZOLE 1 MG/1
1 TABLET ORAL DAILY
Qty: 90 TABLET | Refills: 0 | Status: SHIPPED | OUTPATIENT
Start: 2024-03-21

## 2024-05-30 ENCOUNTER — HOSPITAL ENCOUNTER (OUTPATIENT)
Dept: RADIOLOGY | Facility: MEDICAL CENTER | Age: 74
End: 2024-05-30
Attending: INTERNAL MEDICINE
Payer: MEDICARE

## 2024-05-30 DIAGNOSIS — Z79.811 LONG TERM (CURRENT) USE OF AROMATASE INHIBITORS: ICD-10-CM

## 2024-05-30 DIAGNOSIS — C50.212 MALIGNANT NEOPLASM OF UPPER-INNER QUADRANT OF LEFT BREAST IN FEMALE, ESTROGEN RECEPTOR POSITIVE (HCC): ICD-10-CM

## 2024-05-30 DIAGNOSIS — Z17.0 MALIGNANT NEOPLASM OF UPPER-INNER QUADRANT OF LEFT BREAST IN FEMALE, ESTROGEN RECEPTOR POSITIVE (HCC): ICD-10-CM

## 2024-06-12 ENCOUNTER — HOSPITAL ENCOUNTER (OUTPATIENT)
Dept: HEMATOLOGY ONCOLOGY | Facility: MEDICAL CENTER | Age: 74
End: 2024-06-12
Attending: INTERNAL MEDICINE
Payer: MEDICARE

## 2024-06-12 VITALS
HEIGHT: 64 IN | HEART RATE: 86 BPM | BODY MASS INDEX: 37.22 KG/M2 | DIASTOLIC BLOOD PRESSURE: 76 MMHG | WEIGHT: 218 LBS | TEMPERATURE: 99.2 F | SYSTOLIC BLOOD PRESSURE: 118 MMHG | OXYGEN SATURATION: 93 %

## 2024-06-12 DIAGNOSIS — Z17.0 MALIGNANT NEOPLASM OF UPPER-INNER QUADRANT OF LEFT BREAST IN FEMALE, ESTROGEN RECEPTOR POSITIVE (HCC): ICD-10-CM

## 2024-06-12 DIAGNOSIS — C50.212 MALIGNANT NEOPLASM OF UPPER-INNER QUADRANT OF LEFT BREAST IN FEMALE, ESTROGEN RECEPTOR POSITIVE (HCC): ICD-10-CM

## 2024-06-12 PROCEDURE — 99212 OFFICE O/P EST SF 10 MIN: CPT | Performed by: INTERNAL MEDICINE

## 2024-06-12 PROCEDURE — 99213 OFFICE O/P EST LOW 20 MIN: CPT | Performed by: INTERNAL MEDICINE

## 2024-06-12 ASSESSMENT — ENCOUNTER SYMPTOMS
FEVER: 0
TINGLING: 0
MYALGIAS: 0
SHORTNESS OF BREATH: 0
DIARRHEA: 0
COUGH: 0
WEIGHT LOSS: 0
CONSTIPATION: 0
CHILLS: 0
VOMITING: 0
NAUSEA: 0
HEADACHES: 1
DIZZINESS: 0
PALPITATIONS: 0

## 2024-06-12 ASSESSMENT — FIBROSIS 4 INDEX: FIB4 SCORE: 1.5

## 2024-06-12 ASSESSMENT — PAIN SCALES - GENERAL: PAINLEVEL: NO PAIN

## 2024-06-12 NOTE — ADDENDUM NOTE
Encounter addended by: William Hillman, Med Ass't on: 6/12/2024 4:46 PM   Actions taken: Charge Capture section accepted

## 2024-06-12 NOTE — PROGRESS NOTES
Subjective   Medical oncology visit:  6/12/2024  Donna Palmer is a 72 y.o. female who presents with ER positive breast cancer receiving adjuvant chemotherapy with AC          HPI    Referring Physician: Lubna Donaldson MD  Primary Care:   [facility].     Diagnosis: Ductal carcinoma of the left breast, grade 3, clinically stage IIa (cT2, cN0) ER +95%, IL negative, HER2 IHC 2+ FISH negative, Ki-67 30%     Chief Complaint: Patient seen today for evaluation of her breast cancer, for continued monitoring of symptoms and side effects of cancer treatments, employing AC.     Oncology history of presenting illness:  Patient self detected a mass in her left breast in February 2023.  She had not had a mammogram for several years.  A diagnostic mammogram and ultrasound were completed on 3/6/2023 which showed a 2.7 x 2.4 x 2.0 cm spiculated irregular mass at 3 o'clock position of the left breast with no abnormal lymph nodes being detected.  She underwent an immediate ultrasound-guided biopsy demonstrated invasive ductal carcinoma, grade 3, at least 1.7 cm in the core, ER +95%, IL negative, Ki-67 30%, HER2 2+ IHC FISH negative.  She saw Dr. Donaldson in consultation and is planning to undergo partial mastectomy and sentinel lymph node biopsy.     Her history is notable for post polio syndrome with motor nerve impairment which is gotten gradually worse over the last couple of years.  She now has to use a scooter almost all the time and has started having impairment in activities of daily living.  Otherwise she has no chronic medical problems.  She has a maternal aunt who had breast cancer.  No other breast or ovarian cancer is noted.     Interval histories:  Interval history 4/20/2023: She underwent left partial mastectomy and sentinel lymph node biopsy on 4/4/2023.  Pathology demonstrated invasive ductal carcinoma 3.5 cm in greatest dimension with focal high-grade DCIS.  Margins were clear and there was no  lymph vascular invasion.  1 sentinel lymph node was positive for micrometastases and 1 sentinel lymph node was negative.  Postoperative course was unremarkable but she does feel a firm mass at the incision site which is consistent with a seroma in the breast.  We sent an Oncotype DX which came back with recurrence score of 39.  She has no history of heart problems but does have history of hypertension.     Interval history 05/10/23 (CAlsop, APRN):  Patient seen today for her toxicity check following her first dose of chemotherapy employing AC.  Patient appeared to tolerate treatment very well.  She has not required any antinausea medications.  She had very mild underlying nausea but again no need for medication.  She does complain of constipation which is new with treatment.  She did have a bowel movement last night.  She was quite constipated after treatment and ended up taking milk of magnesia dose on Sunday night which caused diarrhea.  She also noticed a very mild elevation in temperature but that resolved.  She states her appetite has been good and she is eating well.  She has noticed decrease in energy this past week as well.     Interval history 05/24/23 (CAlsop, APRN):  Patient is apprehensive and scared about cycle 2 but overall she has been doing well.  She stated that her constipation is resolved and she is having normal bowel movements.  She did state that her hair started to fall out so she recently shaved it which she has been dealing with mentally.  Otherwise no other significant clinical complaints.  Derm at the incision site is still present but appears to be decreasing in size.  According to the patient she did reach out to Dr. Donaldson and was informed that there was no concerns at this time.     Interval history 06/14/23 (CAlsop, APRN):  Patient has done well.  She did have what she believed to be a urinary tract infection.  UA was negative for any bacteria but she did have positive leukocytes.  She  was quite symptomatic with pain, burning and pressure with urination, therefore I did treat her with a short course of antibiotics with Macrobid.  She did complete the antibiotics and as of last night her symptoms have completely resolved.  She denies any foul odor or vaginal discharge.  She denies any nausea or vomiting.  Her constipation is well controlled.  She did have a headache for just a few days after treatment but otherwise no other complaints.    Interval history 7/5/2023: She comes in for her fourth and final cycle of Adriamycin and cyclophosphamide.  Overall she has done well with it.  She has about a week of feeling Malays and puny after treatment.  She has minimal nausea and has not taken any antiemetics after her premeds.  She did get a headache again but it was better than the previous time.  She does not wish to change her antiemetic regimen.  She had symptoms of urinary tract infection but a negative UA after the last cycle again and this is likely mucosal irritation.  She will be seeing radiation next week.  She has generalized weakness but no new focal motor weakness.    Interval history 8/9/2023: She is getting radiation therapy and is about [facility] done with a 15-day treatment.  She remains fatigued from the chemotherapy but her urinary symptoms have resolved.  No nausea or vomiting.  She has no increase in her stable motor weakness.  She would like to get her port out before the end of the year.  Her labs are stable and do not require frequent checking now.    Interval history 10/5/2023: She tolerated radiation therapy well with moderate erythema and some mild desquamation which is healing.  She has mild fatigue which is also resolving.  She is ready to initiate endocrine therapy.  Her hair is growing back slowly.    Interval history 12/13/2023: She noticed a lump in the left breast in November which got a little larger.  She had a mammogram on 11/14/2023 which showed hypoechoic lesion with  irregular margins and ultrasound guided biopsy was recommended.  Pathology revealed fat necrosis.  Subsequently she has felt well with no new problems.  She started anastrozole and is tolerating it very well with minimal hot flashes and no night sweats and no musculoskeletal symptoms of note.  She is gone over the effects of chemotherapy except for taste which has not come back fully yet.  Her hair continues to grow and slowly.  She has not had a bone density in several years.    Interval history 6/12/2024: She is feeling relatively well overall.  No change in her postpolio syndrome.  Mild to moderate hot flashes which are tolerable without pharmacologic intervention.  No significant night sweats on anastrozole.  No change in the feeling in her breast.  Bone density showed osteopenia with a T-score of -1.6 and -1.4.    Treatment history:  04/04/23: Left partial mastectomy and sentinel lymph node biopsy - Dr. Donaldson  05/03/23: C1D1 Adriamycin and Cyclophosphamide  05/24/23: C2D1 Adriamycin and Cyclophosphamide  06/14/23: C3D1 Adriamycin and Cyclophosphamide  7/5/2023: C4 D1 Adriamycin and cyclophosphamide      Allergies   Allergen Reactions    Penicillin G Hives     As a child    Tetracycline Vomiting     Current Outpatient Medications on File Prior to Encounter   Medication Sig Dispense Refill    anastrozole (ARIMIDEX) 1 MG Tab TAKE ONE TABLET BY MOUTH ONE TIME DAILY 90 Tablet 0    Multiple Vitamins-Minerals (MULTIVITAMIN ADULT PO) Take  by mouth.       No current facility-administered medications on file prior to encounter.       Review of Systems   Constitutional:  Negative for chills, fever and weight loss.   Respiratory:  Negative for cough and shortness of breath.    Cardiovascular:  Negative for chest pain and palpitations.   Gastrointestinal:  Negative for constipation, diarrhea, nausea and vomiting.   Genitourinary:  Negative for dysuria.   Musculoskeletal:  Negative for myalgias.   Neurological:  Positive for  headaches (slightly for a few days after chemo and then resolves). Negative for dizziness and tingling.              Objective     There were no vitals taken for this visit.     Physical Exam  Vitals reviewed.   Constitutional:       General: She is not in acute distress.     Appearance: Normal appearance. She is not diaphoretic.   HENT:      Head: Normocephalic and atraumatic.   Cardiovascular:      Rate and Rhythm: Normal rate and regular rhythm.      Heart sounds: Normal heart sounds. No murmur heard.     No friction rub. No gallop.   Pulmonary:      Effort: Pulmonary effort is normal. No respiratory distress.      Breath sounds: Normal breath sounds. No wheezing.   Chest:      Comments: Firmness in left breast where lumpectomy site is consistent with fat necrosis.  1+ hyperpigmentation.  No masses nipple discharge or tenderness.  The right breast is normal.  Abdominal:      General: Bowel sounds are normal. There is no distension.      Palpations: Abdomen is soft.      Tenderness: There is no abdominal tenderness.   Musculoskeletal:      Comments: Confined to a wheel chair   Skin:     General: Skin is warm and dry.   Neurological:      Mental Status: She is alert and oriented to person, place, and time.   Psychiatric:         Mood and Affect: Mood normal.         Behavior: Behavior normal.               Latest Reference Range & Units 06/13/23 16:08   WBC 4.8 - 10.8 K/uL 4.8   RBC 4.20 - 5.40 M/uL 4.34   Hemoglobin 12.0 - 16.0 g/dL 12.2   Hematocrit 37.0 - 47.0 % 37.9   MCV 81.4 - 97.8 fL 87.3   MCH 27.0 - 33.0 pg 28.1   MCHC 32.2 - 35.5 g/dL 32.2   RDW 35.9 - 50.0 fL 44.3   Platelet Count 164 - 446 K/uL 371   MPV 9.0 - 12.9 fL 9.5   Neutrophils-Polys 44.00 - 72.00 % 57.70   Neutrophils (Absolute) 1.82 - 7.42 K/uL 2.78   Lymphocytes 22.00 - 41.00 % 19.00 (L)   Lymphs (Absolute) 1.00 - 4.80 K/uL 0.92 (L)   Monocytes 0.00 - 13.40 % 19.00 (H)   Monos (Absolute) 0.00 - 0.85 K/uL 0.92 (H)   Eosinophils 0.00 - 6.90 %  0.60   Eos (Absolute) 0.00 - 0.51 K/uL 0.03   Basophils 0.00 - 1.80 % 1.00   Baso (Absolute) 0.00 - 0.12 K/uL 0.05   Immature Granulocytes 0.00 - 0.90 % 2.70 (H)   Immature Granulocytes (abs) 0.00 - 0.11 K/uL 0.13 (H)   Nucleated RBC 0.00 - 0.20 /100 WBC 0.00   NRBC (Absolute) K/uL 0.00   Outpt Infus CBC Comment  see below   Sodium 135 - 145 mmol/L 143   Potassium 3.6 - 5.5 mmol/L 4.0   Chloride 96 - 112 mmol/L 106   Co2 20 - 33 mmol/L 24   Anion Gap 7.0 - 16.0  13.0   Glucose 65 - 99 mg/dL 123 (H)   Bun 8 - 22 mg/dL 10   Creatinine 0.50 - 1.40 mg/dL 0.56   GFR (CKD-EPI) >60 mL/min/1.73 m 2 98   Calcium 8.5 - 10.5 mg/dL 9.2   Correct Calcium 8.5 - 10.5 mg/dL 9.3   AST(SGOT) 12 - 45 U/L 27   ALT(SGPT) 2 - 50 U/L 22   Alkaline Phosphatase 30 - 99 U/L 76   Total Bilirubin 0.1 - 1.5 mg/dL 0.2   Albumin 3.2 - 4.9 g/dL 3.9   Total Protein 6.0 - 8.2 g/dL 6.3   Globulin 1.9 - 3.5 g/dL 2.4   A-G Ratio g/dL 1.6              Assessment & Plan        Impression:  1.  Invasive ductal carcinoma of the left breast, grade 3, clinically stage IIa (cT2, cN0) ER +95%, NE negative, HER2 IHC 2+ FISH negative, Ki-67 30%.  Status post left partial mastectomy and sentinel lymph node biopsy for invasive ductal carcinoma, grade 3, stage IIb (pT2, PN 1 LEXI).  Oncotype DX recurrence score 39.  She is a good candidate for adjuvant chemotherapy.  Due to her postpolio syndrome AC x 4 was given as adjuvant chemotherapy to avoid taxanes and was reasonably well-tolerated.  Status post radiation therapy to the whole breast, now on anastrozole since October 2023 with excellent tolerance.  2.  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  Stable  3.  Nodule in the left breast postradiation and chemo biopsied fat necrosis.  Stable  4.  Osteopenia    Plan: Continue anastrozole.  She will have repeat mammogram in July.  She will start calcium and vitamin D.  I will see her back in 6 months for routine  follow-up.    Please note that this dictation was created using voice recognition software. I have made every reasonable attempt to correct obvious errors, but I expect that there are errors of grammar and possibly content that I did not discover before finalizing the note.

## 2024-06-27 DIAGNOSIS — C50.212 MALIGNANT NEOPLASM OF UPPER-INNER QUADRANT OF LEFT BREAST IN FEMALE, ESTROGEN RECEPTOR POSITIVE (HCC): ICD-10-CM

## 2024-06-27 DIAGNOSIS — Z17.0 MALIGNANT NEOPLASM OF UPPER-INNER QUADRANT OF LEFT BREAST IN FEMALE, ESTROGEN RECEPTOR POSITIVE (HCC): ICD-10-CM

## 2024-06-28 ENCOUNTER — TELEPHONE (OUTPATIENT)
Dept: HEMATOLOGY ONCOLOGY | Facility: MEDICAL CENTER | Age: 74
End: 2024-06-28
Payer: MEDICARE

## 2024-06-28 RX ORDER — ANASTROZOLE 1 MG/1
1 TABLET ORAL DAILY
Qty: 90 TABLET | Refills: 0 | Status: SHIPPED | OUTPATIENT
Start: 2024-06-28

## 2024-07-15 ENCOUNTER — APPOINTMENT (OUTPATIENT)
Dept: RADIOLOGY | Facility: MEDICAL CENTER | Age: 74
End: 2024-07-15
Attending: INTERNAL MEDICINE
Payer: MEDICARE

## 2024-07-15 DIAGNOSIS — Z17.0 MALIGNANT NEOPLASM OF UPPER-INNER QUADRANT OF LEFT BREAST IN FEMALE, ESTROGEN RECEPTOR POSITIVE (HCC): ICD-10-CM

## 2024-07-15 DIAGNOSIS — C50.212 MALIGNANT NEOPLASM OF UPPER-INNER QUADRANT OF LEFT BREAST IN FEMALE, ESTROGEN RECEPTOR POSITIVE (HCC): ICD-10-CM

## 2024-07-15 PROCEDURE — 76642 ULTRASOUND BREAST LIMITED: CPT | Mod: RT

## 2024-07-21 ENCOUNTER — OFFICE VISIT (OUTPATIENT)
Dept: URGENT CARE | Facility: PHYSICIAN GROUP | Age: 74
End: 2024-07-21
Payer: MEDICARE

## 2024-07-21 VITALS
WEIGHT: 220 LBS | TEMPERATURE: 96.8 F | SYSTOLIC BLOOD PRESSURE: 134 MMHG | HEIGHT: 64 IN | RESPIRATION RATE: 20 BRPM | HEART RATE: 99 BPM | DIASTOLIC BLOOD PRESSURE: 78 MMHG | BODY MASS INDEX: 37.56 KG/M2 | OXYGEN SATURATION: 95 %

## 2024-07-21 DIAGNOSIS — J06.9 UPPER RESPIRATORY TRACT INFECTION DUE TO COVID-19 VIRUS: ICD-10-CM

## 2024-07-21 DIAGNOSIS — U07.1 UPPER RESPIRATORY TRACT INFECTION DUE TO COVID-19 VIRUS: ICD-10-CM

## 2024-07-21 DIAGNOSIS — U07.1 LAB TEST POSITIVE FOR DETECTION OF COVID-19 VIRUS: ICD-10-CM

## 2024-07-21 LAB
FLUAV RNA SPEC QL NAA+PROBE: NEGATIVE
FLUBV RNA SPEC QL NAA+PROBE: NEGATIVE
RSV RNA SPEC QL NAA+PROBE: NEGATIVE
SARS-COV-2 RNA RESP QL NAA+PROBE: POSITIVE

## 2024-07-21 PROCEDURE — 0241U POCT CEPHEID COV-2, FLU A/B, RSV - PCR: CPT

## 2024-07-21 PROCEDURE — 99213 OFFICE O/P EST LOW 20 MIN: CPT

## 2024-07-21 RX ORDER — BENZONATATE 100 MG/1
100 CAPSULE ORAL 3 TIMES DAILY PRN
Qty: 30 CAPSULE | Refills: 0 | Status: SHIPPED | OUTPATIENT
Start: 2024-07-21 | End: 2024-07-31

## 2024-07-21 ASSESSMENT — ENCOUNTER SYMPTOMS
SWOLLEN GLANDS: 0
FEVER: 0
ABDOMINAL PAIN: 0
BACK PAIN: 0
DIARRHEA: 0
CHILLS: 0
NECK PAIN: 0
WHEEZING: 0
SHORTNESS OF BREATH: 0
VOMITING: 0
EYE DISCHARGE: 0
SORE THROAT: 0
RHINORRHEA: 1
MYALGIAS: 0
SINUS PAIN: 0
HEADACHES: 1
COUGH: 1
NAUSEA: 0

## 2024-07-21 ASSESSMENT — FIBROSIS 4 INDEX: FIB4 SCORE: 1.5

## 2024-07-30 ENCOUNTER — APPOINTMENT (OUTPATIENT)
Dept: MEDICAL GROUP | Facility: PHYSICIAN GROUP | Age: 74
End: 2024-07-30
Payer: MEDICARE

## 2024-09-10 DIAGNOSIS — R73.03 PREDIABETES: ICD-10-CM

## 2024-09-10 DIAGNOSIS — E66.01 MORBID OBESITY WITH BMI OF 40.0-44.9, ADULT (HCC): ICD-10-CM

## 2024-09-10 DIAGNOSIS — R73.09 ELEVATED HEMOGLOBIN A1C: ICD-10-CM

## 2024-09-10 DIAGNOSIS — E55.9 VITAMIN D DEFICIENCY: ICD-10-CM

## 2024-09-10 DIAGNOSIS — E78.00 PURE HYPERCHOLESTEROLEMIA: ICD-10-CM

## 2024-09-10 DIAGNOSIS — R53.83 FATIGUE, UNSPECIFIED TYPE: ICD-10-CM

## 2024-09-16 DIAGNOSIS — C50.212 MALIGNANT NEOPLASM OF UPPER-INNER QUADRANT OF LEFT BREAST IN FEMALE, ESTROGEN RECEPTOR POSITIVE (HCC): ICD-10-CM

## 2024-09-16 DIAGNOSIS — Z17.0 MALIGNANT NEOPLASM OF UPPER-INNER QUADRANT OF LEFT BREAST IN FEMALE, ESTROGEN RECEPTOR POSITIVE (HCC): ICD-10-CM

## 2024-09-17 RX ORDER — ANASTROZOLE 1 MG/1
1 TABLET ORAL DAILY
Qty: 90 TABLET | Refills: 0 | Status: SHIPPED | OUTPATIENT
Start: 2024-09-17

## 2024-09-23 ENCOUNTER — HOSPITAL ENCOUNTER (OUTPATIENT)
Dept: LAB | Facility: MEDICAL CENTER | Age: 74
End: 2024-09-23
Payer: MEDICARE

## 2024-09-23 DIAGNOSIS — E66.01 MORBID OBESITY WITH BMI OF 40.0-44.9, ADULT (HCC): ICD-10-CM

## 2024-09-23 DIAGNOSIS — R53.83 FATIGUE, UNSPECIFIED TYPE: ICD-10-CM

## 2024-09-23 DIAGNOSIS — E55.9 VITAMIN D DEFICIENCY: ICD-10-CM

## 2024-09-23 DIAGNOSIS — E78.00 PURE HYPERCHOLESTEROLEMIA: ICD-10-CM

## 2024-09-23 DIAGNOSIS — R73.09 ELEVATED HEMOGLOBIN A1C: ICD-10-CM

## 2024-09-23 LAB
ERYTHROCYTE [DISTWIDTH] IN BLOOD BY AUTOMATED COUNT: 46.1 FL (ref 35.9–50)
EST. AVERAGE GLUCOSE BLD GHB EST-MCNC: 128 MG/DL
HBA1C MFR BLD: 6.1 % (ref 4–5.6)
HCT VFR BLD AUTO: 44.4 % (ref 37–47)
HGB BLD-MCNC: 14 G/DL (ref 12–16)
MCH RBC QN AUTO: 28.6 PG (ref 27–33)
MCHC RBC AUTO-ENTMCNC: 31.5 G/DL (ref 32.2–35.5)
MCV RBC AUTO: 90.8 FL (ref 81.4–97.8)
PLATELET # BLD AUTO: 266 K/UL (ref 164–446)
PMV BLD AUTO: 10.3 FL (ref 9–12.9)
RBC # BLD AUTO: 4.89 M/UL (ref 4.2–5.4)
WBC # BLD AUTO: 6.1 K/UL (ref 4.8–10.8)

## 2024-09-23 PROCEDURE — 80061 LIPID PANEL: CPT

## 2024-09-23 PROCEDURE — 83036 HEMOGLOBIN GLYCOSYLATED A1C: CPT | Mod: GA

## 2024-09-23 PROCEDURE — 80053 COMPREHEN METABOLIC PANEL: CPT

## 2024-09-23 PROCEDURE — 84443 ASSAY THYROID STIM HORMONE: CPT

## 2024-09-23 PROCEDURE — 82306 VITAMIN D 25 HYDROXY: CPT

## 2024-09-23 PROCEDURE — 85027 COMPLETE CBC AUTOMATED: CPT

## 2024-09-23 PROCEDURE — 36415 COLL VENOUS BLD VENIPUNCTURE: CPT | Mod: GA

## 2024-09-24 LAB
25(OH)D3 SERPL-MCNC: 37 NG/ML (ref 30–100)
ALBUMIN SERPL BCP-MCNC: 4.2 G/DL (ref 3.2–4.9)
ALBUMIN/GLOB SERPL: 1.3 G/DL
ALP SERPL-CCNC: 89 U/L (ref 30–99)
ALT SERPL-CCNC: 29 U/L (ref 2–50)
ANION GAP SERPL CALC-SCNC: 17 MMOL/L (ref 7–16)
AST SERPL-CCNC: 29 U/L (ref 12–45)
BILIRUB SERPL-MCNC: 0.4 MG/DL (ref 0.1–1.5)
BUN SERPL-MCNC: 13 MG/DL (ref 8–22)
CALCIUM ALBUM COR SERPL-MCNC: 9.6 MG/DL (ref 8.5–10.5)
CALCIUM SERPL-MCNC: 9.8 MG/DL (ref 8.5–10.5)
CHLORIDE SERPL-SCNC: 104 MMOL/L (ref 96–112)
CHOLEST SERPL-MCNC: 219 MG/DL (ref 100–199)
CO2 SERPL-SCNC: 20 MMOL/L (ref 20–33)
CREAT SERPL-MCNC: 0.56 MG/DL (ref 0.5–1.4)
FASTING STATUS PATIENT QL REPORTED: NORMAL
GFR SERPLBLD CREATININE-BSD FMLA CKD-EPI: 97 ML/MIN/1.73 M 2
GLOBULIN SER CALC-MCNC: 3.2 G/DL (ref 1.9–3.5)
GLUCOSE SERPL-MCNC: 98 MG/DL (ref 65–99)
HDLC SERPL-MCNC: 64 MG/DL
LDLC SERPL CALC-MCNC: 130 MG/DL
POTASSIUM SERPL-SCNC: 4 MMOL/L (ref 3.6–5.5)
PROT SERPL-MCNC: 7.4 G/DL (ref 6–8.2)
SODIUM SERPL-SCNC: 141 MMOL/L (ref 135–145)
TRIGL SERPL-MCNC: 124 MG/DL (ref 0–149)
TSH SERPL-ACNC: 1.82 UIU/ML (ref 0.35–5.5)

## 2024-10-07 SDOH — ECONOMIC STABILITY: INCOME INSECURITY: HOW HARD IS IT FOR YOU TO PAY FOR THE VERY BASICS LIKE FOOD, HOUSING, MEDICAL CARE, AND HEATING?: NOT HARD AT ALL

## 2024-10-07 SDOH — HEALTH STABILITY: PHYSICAL HEALTH: ON AVERAGE, HOW MANY MINUTES DO YOU ENGAGE IN EXERCISE AT THIS LEVEL?: 30 MIN

## 2024-10-07 SDOH — HEALTH STABILITY: PHYSICAL HEALTH: ON AVERAGE, HOW MANY DAYS PER WEEK DO YOU ENGAGE IN MODERATE TO STRENUOUS EXERCISE (LIKE A BRISK WALK)?: 5 DAYS

## 2024-10-07 SDOH — ECONOMIC STABILITY: FOOD INSECURITY: WITHIN THE PAST 12 MONTHS, YOU WORRIED THAT YOUR FOOD WOULD RUN OUT BEFORE YOU GOT MONEY TO BUY MORE.: NEVER TRUE

## 2024-10-07 SDOH — ECONOMIC STABILITY: INCOME INSECURITY: IN THE LAST 12 MONTHS, WAS THERE A TIME WHEN YOU WERE NOT ABLE TO PAY THE MORTGAGE OR RENT ON TIME?: NO

## 2024-10-07 SDOH — ECONOMIC STABILITY: FOOD INSECURITY: WITHIN THE PAST 12 MONTHS, THE FOOD YOU BOUGHT JUST DIDN'T LAST AND YOU DIDN'T HAVE MONEY TO GET MORE.: NEVER TRUE

## 2024-10-07 ASSESSMENT — SOCIAL DETERMINANTS OF HEALTH (SDOH)
HOW OFTEN DO YOU ATTENT MEETINGS OF THE CLUB OR ORGANIZATION YOU BELONG TO?: 1 TO 4 TIMES PER YEAR
DO YOU BELONG TO ANY CLUBS OR ORGANIZATIONS SUCH AS CHURCH GROUPS UNIONS, FRATERNAL OR ATHLETIC GROUPS, OR SCHOOL GROUPS?: YES
HOW OFTEN DO YOU ATTENT MEETINGS OF THE CLUB OR ORGANIZATION YOU BELONG TO?: 1 TO 4 TIMES PER YEAR
DO YOU BELONG TO ANY CLUBS OR ORGANIZATIONS SUCH AS CHURCH GROUPS UNIONS, FRATERNAL OR ATHLETIC GROUPS, OR SCHOOL GROUPS?: YES
HOW HARD IS IT FOR YOU TO PAY FOR THE VERY BASICS LIKE FOOD, HOUSING, MEDICAL CARE, AND HEATING?: NOT HARD AT ALL
HOW OFTEN DO YOU HAVE SIX OR MORE DRINKS ON ONE OCCASION: NEVER
HOW OFTEN DO YOU HAVE A DRINK CONTAINING ALCOHOL: NEVER
HOW MANY DRINKS CONTAINING ALCOHOL DO YOU HAVE ON A TYPICAL DAY WHEN YOU ARE DRINKING: PATIENT DOES NOT DRINK
HOW OFTEN DO YOU ATTEND CHURCH OR RELIGIOUS SERVICES?: MORE THAN 4 TIMES PER YEAR
IN A TYPICAL WEEK, HOW MANY TIMES DO YOU TALK ON THE PHONE WITH FAMILY, FRIENDS, OR NEIGHBORS?: ONCE A WEEK
HOW OFTEN DO YOU GET TOGETHER WITH FRIENDS OR RELATIVES?: ONCE A WEEK
HOW OFTEN DO YOU GET TOGETHER WITH FRIENDS OR RELATIVES?: ONCE A WEEK
IN A TYPICAL WEEK, HOW MANY TIMES DO YOU TALK ON THE PHONE WITH FAMILY, FRIENDS, OR NEIGHBORS?: ONCE A WEEK
HOW OFTEN DO YOU ATTEND CHURCH OR RELIGIOUS SERVICES?: MORE THAN 4 TIMES PER YEAR
IN THE PAST 12 MONTHS, HAS THE ELECTRIC, GAS, OIL, OR WATER COMPANY THREATENED TO SHUT OFF SERVICE IN YOUR HOME?: NO
WITHIN THE PAST 12 MONTHS, YOU WORRIED THAT YOUR FOOD WOULD RUN OUT BEFORE YOU GOT THE MONEY TO BUY MORE: NEVER TRUE

## 2024-10-07 ASSESSMENT — LIFESTYLE VARIABLES
AUDIT-C TOTAL SCORE: 0
HOW MANY STANDARD DRINKS CONTAINING ALCOHOL DO YOU HAVE ON A TYPICAL DAY: PATIENT DOES NOT DRINK
HOW OFTEN DO YOU HAVE SIX OR MORE DRINKS ON ONE OCCASION: NEVER
SKIP TO QUESTIONS 9-10: 1
HOW OFTEN DO YOU HAVE A DRINK CONTAINING ALCOHOL: NEVER

## 2024-10-10 ENCOUNTER — APPOINTMENT (OUTPATIENT)
Dept: MEDICAL GROUP | Facility: PHYSICIAN GROUP | Age: 74
End: 2024-10-10
Payer: MEDICARE

## 2024-10-10 VITALS
BODY MASS INDEX: 37.56 KG/M2 | RESPIRATION RATE: 16 BRPM | HEIGHT: 64 IN | HEART RATE: 83 BPM | WEIGHT: 220 LBS | SYSTOLIC BLOOD PRESSURE: 130 MMHG | OXYGEN SATURATION: 95 % | TEMPERATURE: 98 F | DIASTOLIC BLOOD PRESSURE: 88 MMHG

## 2024-10-10 DIAGNOSIS — Z86.12 HISTORY OF POLIOMYELITIS: ICD-10-CM

## 2024-10-10 DIAGNOSIS — C50.212 MALIGNANT NEOPLASM OF UPPER-INNER QUADRANT OF LEFT BREAST IN FEMALE, ESTROGEN RECEPTOR POSITIVE (HCC): ICD-10-CM

## 2024-10-10 DIAGNOSIS — E78.00 PURE HYPERCHOLESTEROLEMIA: ICD-10-CM

## 2024-10-10 DIAGNOSIS — Z17.0 MALIGNANT NEOPLASM OF UPPER-INNER QUADRANT OF LEFT BREAST IN FEMALE, ESTROGEN RECEPTOR POSITIVE (HCC): ICD-10-CM

## 2024-10-10 DIAGNOSIS — R73.03 PREDIABETES: ICD-10-CM

## 2024-10-10 PROBLEM — E66.01 MORBID OBESITY WITH BMI OF 40.0-44.9, ADULT (HCC): Status: RESOLVED | Noted: 2023-08-08 | Resolved: 2024-10-10

## 2024-10-10 PROBLEM — E66.01 CLASS 2 SEVERE OBESITY DUE TO EXCESS CALORIES WITH SERIOUS COMORBIDITY AND BODY MASS INDEX (BMI) OF 37.0 TO 37.9 IN ADULT (HCC): Status: ACTIVE | Noted: 2020-11-23

## 2024-10-10 PROBLEM — E66.812 CLASS 2 SEVERE OBESITY DUE TO EXCESS CALORIES WITH SERIOUS COMORBIDITY AND BODY MASS INDEX (BMI) OF 37.0 TO 37.9 IN ADULT (HCC): Status: ACTIVE | Noted: 2020-11-23

## 2024-10-10 PROCEDURE — 3075F SYST BP GE 130 - 139MM HG: CPT

## 2024-10-10 PROCEDURE — G0439 PPPS, SUBSEQ VISIT: HCPCS

## 2024-10-10 PROCEDURE — 3079F DIAST BP 80-89 MM HG: CPT

## 2024-10-10 ASSESSMENT — ENCOUNTER SYMPTOMS: GENERAL WELL-BEING: GOOD

## 2024-10-10 ASSESSMENT — PATIENT HEALTH QUESTIONNAIRE - PHQ9: CLINICAL INTERPRETATION OF PHQ2 SCORE: 0

## 2024-10-10 ASSESSMENT — ACTIVITIES OF DAILY LIVING (ADL): BATHING_REQUIRES_ASSISTANCE: 0

## 2024-10-10 ASSESSMENT — FIBROSIS 4 INDEX: FIB4 SCORE: 1.44

## 2024-11-19 ENCOUNTER — TELEPHONE (OUTPATIENT)
Dept: HEMATOLOGY ONCOLOGY | Facility: MEDICAL CENTER | Age: 74
End: 2024-11-19
Payer: MEDICARE

## 2024-11-19 NOTE — TELEPHONE ENCOUNTER
[facility] for patient from medical oncology to reschedule her upcoming appointment with Dr. Zaman. He is going to be out of the office on 12/11/24. I can reschedule her for a follow up appointment in January with Dr. Zaman or earlier with a nurse practitioner. Office phone number provided for call back.

## 2024-12-12 DIAGNOSIS — Z17.0 MALIGNANT NEOPLASM OF UPPER-INNER QUADRANT OF LEFT BREAST IN FEMALE, ESTROGEN RECEPTOR POSITIVE (HCC): ICD-10-CM

## 2024-12-12 DIAGNOSIS — C50.212 MALIGNANT NEOPLASM OF UPPER-INNER QUADRANT OF LEFT BREAST IN FEMALE, ESTROGEN RECEPTOR POSITIVE (HCC): ICD-10-CM

## 2024-12-12 RX ORDER — ANASTROZOLE 1 MG/1
1 TABLET ORAL DAILY
Qty: 90 TABLET | Refills: 0 | Status: SHIPPED | OUTPATIENT
Start: 2024-12-12

## 2025-01-29 ENCOUNTER — HOSPITAL ENCOUNTER (OUTPATIENT)
Dept: HEMATOLOGY ONCOLOGY | Facility: MEDICAL CENTER | Age: 75
End: 2025-01-29
Attending: INTERNAL MEDICINE
Payer: MEDICARE

## 2025-01-29 VITALS
WEIGHT: 215 LBS | OXYGEN SATURATION: 95 % | DIASTOLIC BLOOD PRESSURE: 76 MMHG | TEMPERATURE: 98.7 F | SYSTOLIC BLOOD PRESSURE: 112 MMHG | BODY MASS INDEX: 36.7 KG/M2 | HEART RATE: 90 BPM | HEIGHT: 64 IN

## 2025-01-29 DIAGNOSIS — Z17.0 MALIGNANT NEOPLASM OF UPPER-INNER QUADRANT OF LEFT BREAST IN FEMALE, ESTROGEN RECEPTOR POSITIVE (HCC): ICD-10-CM

## 2025-01-29 DIAGNOSIS — C50.212 MALIGNANT NEOPLASM OF UPPER-INNER QUADRANT OF LEFT BREAST IN FEMALE, ESTROGEN RECEPTOR POSITIVE (HCC): ICD-10-CM

## 2025-01-29 PROCEDURE — 99212 OFFICE O/P EST SF 10 MIN: CPT | Performed by: INTERNAL MEDICINE

## 2025-01-29 ASSESSMENT — ENCOUNTER SYMPTOMS
CONSTIPATION: 0
FEVER: 0
SHORTNESS OF BREATH: 0
NAUSEA: 0
PALPITATIONS: 0
DIARRHEA: 0
WEIGHT LOSS: 0
TINGLING: 0
DIZZINESS: 0
VOMITING: 0
MYALGIAS: 0
COUGH: 0
CHILLS: 0
HEADACHES: 1

## 2025-01-29 ASSESSMENT — FIBROSIS 4 INDEX: FIB4 SCORE: 1.46

## 2025-01-29 ASSESSMENT — PAIN SCALES - GENERAL: PAINLEVEL_OUTOF10: NO PAIN

## 2025-01-29 NOTE — PROGRESS NOTES
Subjective   Medical oncology visit: 1/29/2025  Donna Palmer is a 72 y.o. female who presents with ER positive breast cancer receiving adjuvant chemotherapy with AC          HPI    Referring Physician: Lubna Donaldson MD  Primary Care:   [facility] Gallego.     Diagnosis: Ductal carcinoma of the left breast, grade 3, clinically stage IIa (cT2, cN0) ER +95%, OH negative, HER2 IHC 2+ FISH negative, Ki-67 30%     Chief Complaint: Patient seen today for evaluation of her breast cancer, for continued monitoring of symptoms and side effects of cancer treatments, employing AC.     Oncology history of presenting illness:  Patient self detected a mass in her left breast in February 2023.  She had not had a mammogram for several years.  A diagnostic mammogram and ultrasound were completed on 3/6/2023 which showed a 2.7 x 2.4 x 2.0 cm spiculated irregular mass at 3 o'clock position of the left breast with no abnormal lymph nodes being detected.  She underwent an immediate ultrasound-guided biopsy demonstrated invasive ductal carcinoma, grade 3, at least 1.7 cm in the core, ER +95%, OH negative, Ki-67 30%, HER2 2+ IHC FISH negative.  She saw Dr. Donaldson in consultation and is planning to undergo partial mastectomy and sentinel lymph node biopsy.     Her history is notable for post polio syndrome with motor nerve impairment which is gotten gradually worse over the last couple of years.  She now has to use a scooter almost all the time and has started having impairment in activities of daily living.  Otherwise she has no chronic medical problems.  She has a maternal aunt who had breast cancer.  No other breast or ovarian cancer is noted.     Interval histories:  Interval history 4/20/2023: She underwent left partial mastectomy and sentinel lymph node biopsy on 4/4/2023.  Pathology demonstrated invasive ductal carcinoma 3.5 cm in greatest dimension with focal high-grade DCIS.  Margins were clear and there was no lymph  vascular invasion.  1 sentinel lymph node was positive for micrometastases and 1 sentinel lymph node was negative.  Postoperative course was unremarkable but she does feel a firm mass at the incision site which is consistent with a seroma in the breast.  We sent an Oncotype DX which came back with recurrence score of 39.  She has no history of heart problems but does have history of hypertension.     Interval history 05/10/23 (CAlsop, APRN):  Patient seen today for her toxicity check following her first dose of chemotherapy employing AC.  Patient appeared to tolerate treatment very well.  She has not required any antinausea medications.  She had very mild underlying nausea but again no need for medication.  She does complain of constipation which is new with treatment.  She did have a bowel movement last night.  She was quite constipated after treatment and ended up taking milk of magnesia dose on Sunday night which caused diarrhea.  She also noticed a very mild elevation in temperature but that resolved.  She states her appetite has been good and she is eating well.  She has noticed decrease in energy this past week as well.     Interval history 05/24/23 (CAlsop, APRN):  Patient is apprehensive and scared about cycle 2 but overall she has been doing well.  She stated that her constipation is resolved and she is having normal bowel movements.  She did state that her hair started to fall out so she recently shaved it which she has been dealing with mentally.  Otherwise no other significant clinical complaints.  Derm at the incision site is still present but appears to be decreasing in size.  According to the patient she did reach out to Dr. Donaldson and was informed that there was no concerns at this time.     Interval history 06/14/23 (CAlsop, APRN):  Patient has done well.  She did have what she believed to be a urinary tract infection.  UA was negative for any bacteria but she did have positive leukocytes.  She was  quite symptomatic with pain, burning and pressure with urination, therefore I did treat her with a short course of antibiotics with Macrobid.  She did complete the antibiotics and as of last night her symptoms have completely resolved.  She denies any foul odor or vaginal discharge.  She denies any nausea or vomiting.  Her constipation is well controlled.  She did have a headache for just a few days after treatment but otherwise no other complaints.    Interval history 7/5/2023: She comes in for her fourth and final cycle of Adriamycin and cyclophosphamide.  Overall she has done well with it.  She has about a week of feeling Malays and puny after treatment.  She has minimal nausea and has not taken any antiemetics after her premeds.  She did get a headache again but it was better than the previous time.  She does not wish to change her antiemetic regimen.  She had symptoms of urinary tract infection but a negative UA after the last cycle again and this is likely mucosal irritation.  She will be seeing radiation next week.  She has generalized weakness but no new focal motor weakness.    Interval history 8/9/2023: She is getting radiation therapy and is about residential done with a 15-day treatment.  She remains fatigued from the chemotherapy but her urinary symptoms have resolved.  No nausea or vomiting.  She has no increase in her stable motor weakness.  She would like to get her port out before the end of the year.  Her labs are stable and do not require frequent checking now.    Interval history 10/5/2023: She tolerated radiation therapy well with moderate erythema and some mild desquamation which is healing.  She has mild fatigue which is also resolving.  She is ready to initiate endocrine therapy.  Her hair is growing back slowly.    Interval history 12/13/2023: She noticed a lump in the left breast in November which got a little larger.  She had a mammogram on 11/14/2023 which showed hypoechoic lesion with  irregular margins and ultrasound guided biopsy was recommended.  Pathology revealed fat necrosis.  Subsequently she has felt well with no new problems.  She started anastrozole and is tolerating it very well with minimal hot flashes and no night sweats and no musculoskeletal symptoms of note.  She is gone over the effects of chemotherapy except for taste which has not come back fully yet.  Her hair continues to grow and slowly.  She has not had a bone density in several years.    Interval history 6/12/2024: She is feeling relatively well overall.  No change in her postpolio syndrome.  Mild to moderate hot flashes which are tolerable without pharmacologic intervention.  No significant night sweats on anastrozole.  No change in the feeling in her breast.  Bone density showed osteopenia with a T-score of -1.6 and -1.4.    Interval history 1/29/2025: She is tolerating anastrozole without difficulty.  She has mild hot flashes which are stable.  Her biggest new concern is a chronic cough which has been lasting the last few months.  It is nonproductive and not influenced by position.  It has been fairly consistent and is worrying her and her .  She has no sputum production or hemoptysis.  She did have an episode of COVID when she was coughing but this has occurred temporally distant from that.  Postpolio syndrome is stable.    Treatment history:  04/04/23: Left partial mastectomy and sentinel lymph node biopsy - Dr. Donaldson  05/03/23: C1D1 Adriamycin and Cyclophosphamide  05/24/23: C2D1 Adriamycin and Cyclophosphamide  06/14/23: C3D1 Adriamycin and Cyclophosphamide  7/5/2023: C4 D1 Adriamycin and cyclophosphamide      Allergies   Allergen Reactions    Penicillin G Hives     As a child    Tetracycline Vomiting     Current Outpatient Medications on File Prior to Encounter   Medication Sig Dispense Refill    anastrozole (ARIMIDEX) 1 MG Tab TAKE ONE TABLET BY MOUTH ONE TIME DAILY 90 Tablet 0    Multiple Vitamins-Minerals  "(MULTIVITAMIN ADULT PO) Take  by mouth.       No current facility-administered medications on file prior to encounter.       Review of Systems   Constitutional:  Negative for chills, fever and weight loss.   Respiratory:  Negative for cough and shortness of breath.    Cardiovascular:  Negative for chest pain and palpitations.   Gastrointestinal:  Negative for constipation, diarrhea, nausea and vomiting.   Genitourinary:  Negative for dysuria.   Musculoskeletal:  Negative for myalgias.   Neurological:  Positive for headaches (slightly for a few days after chemo and then resolves). Negative for dizziness and tingling.              Objective     /76 (BP Location: Right arm, Patient Position: Sitting)   Pulse 90   Temp 37.1 °C (98.7 °F) (Temporal)   Ht 1.626 m (5' 4\")   Wt 97.5 kg (215 lb)   SpO2 95%   BMI 36.90 kg/m²      Physical Exam  Vitals reviewed.   Constitutional:       General: She is not in acute distress.     Appearance: Normal appearance. She is not diaphoretic.   HENT:      Head: Normocephalic and atraumatic.   Cardiovascular:      Rate and Rhythm: Normal rate and regular rhythm.      Heart sounds: Normal heart sounds. No murmur heard.     No friction rub. No gallop.   Pulmonary:      Effort: Pulmonary effort is normal. No respiratory distress.      Breath sounds: Normal breath sounds. No wheezing.   Chest:      Comments: Firmness in left breast where lumpectomy site is consistent with fat necrosis.  1+ hyperpigmentation.  No masses nipple discharge or tenderness.  The right breast is normal.  Abdominal:      General: Bowel sounds are normal. There is no distension.      Palpations: Abdomen is soft.      Tenderness: There is no abdominal tenderness.   Musculoskeletal:      Comments: Confined to a wheel chair   Skin:     General: Skin is warm and dry.   Neurological:      Mental Status: She is alert and oriented to person, place, and time.   Psychiatric:         Mood and Affect: Mood normal.    "      Behavior: Behavior normal.             Latest Reference Range & Units 06/13/23 16:08   WBC 4.8 - 10.8 K/uL 4.8   RBC 4.20 - 5.40 M/uL 4.34   Hemoglobin 12.0 - 16.0 g/dL 12.2   Hematocrit 37.0 - 47.0 % 37.9   MCV 81.4 - 97.8 fL 87.3   MCH 27.0 - 33.0 pg 28.1   MCHC 32.2 - 35.5 g/dL 32.2   RDW 35.9 - 50.0 fL 44.3   Platelet Count 164 - 446 K/uL 371   MPV 9.0 - 12.9 fL 9.5   Neutrophils-Polys 44.00 - 72.00 % 57.70   Neutrophils (Absolute) 1.82 - 7.42 K/uL 2.78   Lymphocytes 22.00 - 41.00 % 19.00 (L)   Lymphs (Absolute) 1.00 - 4.80 K/uL 0.92 (L)   Monocytes 0.00 - 13.40 % 19.00 (H)   Monos (Absolute) 0.00 - 0.85 K/uL 0.92 (H)   Eosinophils 0.00 - 6.90 % 0.60   Eos (Absolute) 0.00 - 0.51 K/uL 0.03   Basophils 0.00 - 1.80 % 1.00   Baso (Absolute) 0.00 - 0.12 K/uL 0.05   Immature Granulocytes 0.00 - 0.90 % 2.70 (H)   Immature Granulocytes (abs) 0.00 - 0.11 K/uL 0.13 (H)   Nucleated RBC 0.00 - 0.20 /100 WBC 0.00   NRBC (Absolute) K/uL 0.00   Outpt Infus CBC Comment  see below   Sodium 135 - 145 mmol/L 143   Potassium 3.6 - 5.5 mmol/L 4.0   Chloride 96 - 112 mmol/L 106   Co2 20 - 33 mmol/L 24   Anion Gap 7.0 - 16.0  13.0   Glucose 65 - 99 mg/dL 123 (H)   Bun 8 - 22 mg/dL 10   Creatinine 0.50 - 1.40 mg/dL 0.56   GFR (CKD-EPI) >60 mL/min/1.73 m 2 98   Calcium 8.5 - 10.5 mg/dL 9.2   Correct Calcium 8.5 - 10.5 mg/dL 9.3   AST(SGOT) 12 - 45 U/L 27   ALT(SGPT) 2 - 50 U/L 22   Alkaline Phosphatase 30 - 99 U/L 76   Total Bilirubin 0.1 - 1.5 mg/dL 0.2   Albumin 3.2 - 4.9 g/dL 3.9   Total Protein 6.0 - 8.2 g/dL 6.3   Globulin 1.9 - 3.5 g/dL 2.4   A-G Ratio g/dL 1.6              Assessment & Plan        Impression:  1.  Invasive ductal carcinoma of the left breast, grade 3, clinically stage IIa (cT2, cN0) ER +95%, NC negative, HER2 IHC 2+ FISH negative, Ki-67 30%.  Status post left partial mastectomy and sentinel lymph node biopsy for invasive ductal carcinoma, grade 3, stage IIb (pT2, PN 1 LEXI).  Oncotype DX recurrence score 39.     Due to her postpolio syndrome AC x 4 was given as adjuvant chemotherapy to avoid taxanes and was reasonably well-tolerated.  Status post radiation therapy to the whole breast, now on anastrozole since October 2023 with excellent tolerance.  2.  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  Stable  3.  Nodule in the left breast postradiation and chemo biopsied fat necrosis.  Stable  4.  Osteopenia  5.  Chronic cough times several months.  Will get CT scan of the chest and labs to evaluate for pulmonary abnormality.    Plan: Continue anastrozole.  We will do CT scan of the chest as well as get tumor markers and other labs.  We will see her back in 6 months if these are negative.  Please note that this dictation was created using voice recognition software. I have made every reasonable attempt to correct obvious errors, but I expect that there are errors of grammar and possibly content that I did not discover before finalizing the note.           normal...

## 2025-01-29 NOTE — ADDENDUM NOTE
Encounter addended by: Libra Salazar, Med Ass't on: 1/29/2025 3:31 PM   Actions taken: Charge Capture section accepted

## 2025-01-30 DIAGNOSIS — E66.812 CLASS 2 SEVERE OBESITY DUE TO EXCESS CALORIES WITH SERIOUS COMORBIDITY AND BODY MASS INDEX (BMI) OF 37.0 TO 37.9 IN ADULT (HCC): ICD-10-CM

## 2025-01-30 DIAGNOSIS — Z13.6 SCREENING FOR CARDIOVASCULAR CONDITION: ICD-10-CM

## 2025-01-30 DIAGNOSIS — E55.9 VITAMIN D DEFICIENCY: ICD-10-CM

## 2025-01-30 DIAGNOSIS — E66.01 CLASS 2 SEVERE OBESITY DUE TO EXCESS CALORIES WITH SERIOUS COMORBIDITY AND BODY MASS INDEX (BMI) OF 37.0 TO 37.9 IN ADULT (HCC): ICD-10-CM

## 2025-01-30 DIAGNOSIS — R73.09 ELEVATED HEMOGLOBIN A1C: ICD-10-CM

## 2025-02-07 ENCOUNTER — HOSPITAL ENCOUNTER (OUTPATIENT)
Dept: LAB | Facility: MEDICAL CENTER | Age: 75
End: 2025-02-07
Payer: MEDICARE

## 2025-02-07 ENCOUNTER — HOSPITAL ENCOUNTER (OUTPATIENT)
Dept: LAB | Facility: MEDICAL CENTER | Age: 75
End: 2025-02-07
Attending: INTERNAL MEDICINE
Payer: MEDICARE

## 2025-02-07 DIAGNOSIS — E66.01 CLASS 2 SEVERE OBESITY DUE TO EXCESS CALORIES WITH SERIOUS COMORBIDITY AND BODY MASS INDEX (BMI) OF 37.0 TO 37.9 IN ADULT (HCC): ICD-10-CM

## 2025-02-07 DIAGNOSIS — R73.09 ELEVATED HEMOGLOBIN A1C: ICD-10-CM

## 2025-02-07 DIAGNOSIS — C50.212 MALIGNANT NEOPLASM OF UPPER-INNER QUADRANT OF LEFT BREAST IN FEMALE, ESTROGEN RECEPTOR POSITIVE (HCC): ICD-10-CM

## 2025-02-07 DIAGNOSIS — Z17.0 MALIGNANT NEOPLASM OF UPPER-INNER QUADRANT OF LEFT BREAST IN FEMALE, ESTROGEN RECEPTOR POSITIVE (HCC): ICD-10-CM

## 2025-02-07 DIAGNOSIS — Z13.6 SCREENING FOR CARDIOVASCULAR CONDITION: ICD-10-CM

## 2025-02-07 DIAGNOSIS — E66.812 CLASS 2 SEVERE OBESITY DUE TO EXCESS CALORIES WITH SERIOUS COMORBIDITY AND BODY MASS INDEX (BMI) OF 37.0 TO 37.9 IN ADULT (HCC): ICD-10-CM

## 2025-02-07 LAB
ALBUMIN SERPL BCP-MCNC: 4.1 G/DL (ref 3.2–4.9)
ALBUMIN/GLOB SERPL: 1.3 G/DL
ALP SERPL-CCNC: 94 U/L (ref 30–99)
ALT SERPL-CCNC: 23 U/L (ref 2–50)
ANION GAP SERPL CALC-SCNC: 12 MMOL/L (ref 7–16)
AST SERPL-CCNC: 27 U/L (ref 12–45)
BASOPHILS # BLD AUTO: 0.5 % (ref 0–1.8)
BASOPHILS # BLD: 0.03 K/UL (ref 0–0.12)
BILIRUB SERPL-MCNC: 0.4 MG/DL (ref 0.1–1.5)
BUN SERPL-MCNC: 11 MG/DL (ref 8–22)
CALCIUM ALBUM COR SERPL-MCNC: 9.6 MG/DL (ref 8.5–10.5)
CALCIUM SERPL-MCNC: 9.7 MG/DL (ref 8.5–10.5)
CEA SERPL-MCNC: 1 NG/ML (ref 0–3)
CHLORIDE SERPL-SCNC: 105 MMOL/L (ref 96–112)
CHOLEST SERPL-MCNC: 187 MG/DL (ref 100–199)
CO2 SERPL-SCNC: 24 MMOL/L (ref 20–33)
CREAT SERPL-MCNC: 0.73 MG/DL (ref 0.5–1.4)
EOSINOPHIL # BLD AUTO: 0.1 K/UL (ref 0–0.51)
EOSINOPHIL NFR BLD: 1.5 % (ref 0–6.9)
ERYTHROCYTE [DISTWIDTH] IN BLOOD BY AUTOMATED COUNT: 44.2 FL (ref 35.9–50)
EST. AVERAGE GLUCOSE BLD GHB EST-MCNC: 143 MG/DL
GFR SERPLBLD CREATININE-BSD FMLA CKD-EPI: 87 ML/MIN/1.73 M 2
GLOBULIN SER CALC-MCNC: 3.2 G/DL (ref 1.9–3.5)
GLUCOSE SERPL-MCNC: 97 MG/DL (ref 65–99)
HBA1C MFR BLD: 6.6 % (ref 4–5.6)
HCT VFR BLD AUTO: 42.3 % (ref 37–47)
HDLC SERPL-MCNC: 66 MG/DL
HGB BLD-MCNC: 13.7 G/DL (ref 12–16)
IMM GRANULOCYTES # BLD AUTO: 0.01 K/UL (ref 0–0.11)
IMM GRANULOCYTES NFR BLD AUTO: 0.2 % (ref 0–0.9)
LDLC SERPL CALC-MCNC: 98 MG/DL
LYMPHOCYTES # BLD AUTO: 1.99 K/UL (ref 1–4.8)
LYMPHOCYTES NFR BLD: 30.5 % (ref 22–41)
MCH RBC QN AUTO: 28.8 PG (ref 27–33)
MCHC RBC AUTO-ENTMCNC: 32.4 G/DL (ref 32.2–35.5)
MCV RBC AUTO: 88.9 FL (ref 81.4–97.8)
MONOCYTES # BLD AUTO: 0.41 K/UL (ref 0–0.85)
MONOCYTES NFR BLD AUTO: 6.3 % (ref 0–13.4)
NEUTROPHILS # BLD AUTO: 3.98 K/UL (ref 1.82–7.42)
NEUTROPHILS NFR BLD: 61 % (ref 44–72)
NRBC # BLD AUTO: 0 K/UL
NRBC BLD-RTO: 0 /100 WBC (ref 0–0.2)
PLATELET # BLD AUTO: 268 K/UL (ref 164–446)
PMV BLD AUTO: 9.9 FL (ref 9–12.9)
POTASSIUM SERPL-SCNC: 3.9 MMOL/L (ref 3.6–5.5)
PROT SERPL-MCNC: 7.3 G/DL (ref 6–8.2)
RBC # BLD AUTO: 4.76 M/UL (ref 4.2–5.4)
SODIUM SERPL-SCNC: 141 MMOL/L (ref 135–145)
TRIGL SERPL-MCNC: 115 MG/DL (ref 0–149)
WBC # BLD AUTO: 6.5 K/UL (ref 4.8–10.8)

## 2025-02-07 PROCEDURE — 36415 COLL VENOUS BLD VENIPUNCTURE: CPT

## 2025-02-07 PROCEDURE — 86300 IMMUNOASSAY TUMOR CA 15-3: CPT

## 2025-02-07 PROCEDURE — 83036 HEMOGLOBIN GLYCOSYLATED A1C: CPT | Mod: GA

## 2025-02-07 PROCEDURE — 80061 LIPID PANEL: CPT

## 2025-02-07 PROCEDURE — 82378 CARCINOEMBRYONIC ANTIGEN: CPT

## 2025-02-07 PROCEDURE — 80053 COMPREHEN METABOLIC PANEL: CPT

## 2025-02-07 PROCEDURE — 85025 COMPLETE CBC W/AUTO DIFF WBC: CPT

## 2025-02-10 LAB — CANCER AG27-29 SERPL-ACNC: 31.8 U/ML

## 2025-02-11 ENCOUNTER — HOSPITAL ENCOUNTER (OUTPATIENT)
Dept: RADIOLOGY | Facility: MEDICAL CENTER | Age: 75
End: 2025-02-11
Attending: INTERNAL MEDICINE
Payer: MEDICARE

## 2025-02-11 DIAGNOSIS — Z17.0 MALIGNANT NEOPLASM OF UPPER-INNER QUADRANT OF LEFT BREAST IN FEMALE, ESTROGEN RECEPTOR POSITIVE (HCC): ICD-10-CM

## 2025-02-11 DIAGNOSIS — C50.212 MALIGNANT NEOPLASM OF UPPER-INNER QUADRANT OF LEFT BREAST IN FEMALE, ESTROGEN RECEPTOR POSITIVE (HCC): ICD-10-CM

## 2025-02-11 PROCEDURE — 700117 HCHG RX CONTRAST REV CODE 255: Performed by: INTERNAL MEDICINE

## 2025-02-11 PROCEDURE — 71260 CT THORAX DX C+: CPT

## 2025-02-11 RX ADMIN — IOHEXOL 100 ML: 350 INJECTION, SOLUTION INTRAVENOUS at 14:33

## 2025-02-20 ENCOUNTER — TELEPHONE (OUTPATIENT)
Dept: HEMATOLOGY ONCOLOGY | Facility: MEDICAL CENTER | Age: 75
End: 2025-02-20
Payer: MEDICARE

## 2025-02-20 DIAGNOSIS — E04.1 THYROID NODULE: ICD-10-CM

## 2025-02-20 DIAGNOSIS — N28.1 RENAL CYST: ICD-10-CM

## 2025-02-21 NOTE — TELEPHONE ENCOUNTER
Contacted patient in regard to plan of care for thyroid nodule and renal cyst. Per LUISANA Lafleur APRN orders for US of each for further evaluation. Patient verbalizes understanding. MA will assist in getting patient scheduled. Patient appreciative of phone call. No other questions at this time.

## 2025-03-11 DIAGNOSIS — C50.212 MALIGNANT NEOPLASM OF UPPER-INNER QUADRANT OF LEFT BREAST IN FEMALE, ESTROGEN RECEPTOR POSITIVE (HCC): ICD-10-CM

## 2025-03-11 DIAGNOSIS — Z17.0 MALIGNANT NEOPLASM OF UPPER-INNER QUADRANT OF LEFT BREAST IN FEMALE, ESTROGEN RECEPTOR POSITIVE (HCC): ICD-10-CM

## 2025-03-12 DIAGNOSIS — Z17.0 MALIGNANT NEOPLASM OF UPPER-INNER QUADRANT OF LEFT BREAST IN FEMALE, ESTROGEN RECEPTOR POSITIVE (HCC): ICD-10-CM

## 2025-03-12 DIAGNOSIS — C50.212 MALIGNANT NEOPLASM OF UPPER-INNER QUADRANT OF LEFT BREAST IN FEMALE, ESTROGEN RECEPTOR POSITIVE (HCC): ICD-10-CM

## 2025-03-12 RX ORDER — ANASTROZOLE 1 MG/1
1 TABLET ORAL DAILY
Qty: 90 TABLET | Refills: 1 | Status: SHIPPED | OUTPATIENT
Start: 2025-03-12

## 2025-03-12 RX ORDER — ANASTROZOLE 1 MG/1
1 TABLET ORAL DAILY
Qty: 90 TABLET | Refills: 0 | OUTPATIENT
Start: 2025-03-12

## 2025-03-20 ENCOUNTER — APPOINTMENT (OUTPATIENT)
Dept: RADIOLOGY | Facility: MEDICAL CENTER | Age: 75
End: 2025-03-20
Attending: NURSE PRACTITIONER
Payer: MEDICARE

## 2025-04-18 ENCOUNTER — HOSPITAL ENCOUNTER (OUTPATIENT)
Dept: RADIOLOGY | Facility: MEDICAL CENTER | Age: 75
End: 2025-04-18
Attending: NURSE PRACTITIONER
Payer: MEDICARE

## 2025-04-18 DIAGNOSIS — E04.1 THYROID NODULE: ICD-10-CM

## 2025-04-18 DIAGNOSIS — N28.1 RENAL CYST: ICD-10-CM

## 2025-04-18 PROCEDURE — 76775 US EXAM ABDO BACK WALL LIM: CPT

## 2025-04-18 PROCEDURE — 76536 US EXAM OF HEAD AND NECK: CPT

## 2025-04-24 ENCOUNTER — TELEPHONE (OUTPATIENT)
Dept: HEMATOLOGY ONCOLOGY | Facility: MEDICAL CENTER | Age: 75
End: 2025-04-24
Payer: MEDICARE

## 2025-04-24 DIAGNOSIS — N28.1 RENAL CYST: ICD-10-CM

## 2025-04-24 DIAGNOSIS — E04.1 THYROID NODULE: ICD-10-CM

## 2025-04-24 NOTE — TELEPHONE ENCOUNTER
Patient with known Ductal carcinoma of the left breast, grade 3, clinically stage IIa (cT2, cN0) ER +95%, WI negative, HER2 IHC 2+ FISH negative, Ki-67 30% currently on anastrozole.    CT chest completed on 2/11/2025 per Dr. Zaman noted a complex 3.2 cm right thyroid nodule as well as a complex partially visualized right renal cyst.  Further evaluation with ultrasounds for both were recommended.    Renal ultrasound completed on 4/18/25 showed a right renal cyst with no dilation of the collecting system, no hydronephrosis and no renal calculi.  There is also a left parapelvic cyst with no dilation of the collecting system and no hydronephrosis and no renal calculi noted.  No further follow-up needed with regards to the renal cysts.    Thyroid ultrasound completed on 4/8/2025 shows a solid, isoechoic, wider than tall, smooth margins and macrocalcification nodule on the right thyroid measuring 2.2 x 2.8 cm giving a TI-RADS point/category 4.  Due to this lesion being greater than 1.5 cm FNA is recommended.    Discussed with patient on the phone with regards to the recommendation and she did verbalize understanding's and agreed with the plan.  I will contact patient with results once received.      US-THYROID  Result Date: 4/18/2025 4/18/2025 2:18 PM HISTORY/REASON FOR EXAM:  Thyroid nodule on CT scan, Hx breast cancer TECHNIQUE/EXAM DESCRIPTION: Ultrasound of the soft tissues of the head and neck. COMPARISON:  None FINDINGS: The thyroid gland is homogeneous. Vascularity is normal. The right lobe of the thyroid gland measures 2.95 cm x 4.80 cm x 3.30 cm. The contour and echogenicity are normal. No focal mass lesions are identified. The borders of the previously noted right lobe thyroid nodule are not well-defined. The calcification  within the lesion is identified. The lesion is isoechoic to the remainder of the thyroid parenchyma and measures 2.2 x 2.8 cm in diameter. It is solid, isoechoic, wider than tall,  with smooth margins and macrocalcifications. TI-RADS score is 4, corresponding with a TR 4 nodule. Because the lesion is greater than 1.5 cm in diameter, FNA is recommended. The left lobe of the thyroid gland measures 2.14 cm x 4.39 cm x 2.00 cm. The contour and echogenicity are normal. No focal mass lesions are identified. The isthmus measures 0.29 cm.     1.  The borders of the previously noted right lobe thyroid nodule are not well-defined, but the lesion is greater than 1.5 cm in diameter and is a TR 4 lesion. Fine-needle aspiration is recommended. 2.  The remainder of the thyroid gland is within normal limits.      US-RENAL  Result Date: 4/18/2025 4/18/2025 2:19 PM HISTORY/REASON FOR EXAM:  Right renal cyst on ct scan, hx breast cancer TECHNIQUE/EXAM DESCRIPTION: Renal ultrasound. COMPARISON:  None FINDINGS: The right kidney measures 24 cm.  There is a 3.6 x 2.6 x 2.7 cm upper pole, peripherally calcified right renal cyst. The right renal collecting system is not dilated. No hydronephrosis. There are no renal calculi. The left kidney measures 10.12 cm. There is a 2.3 x 1.8 x 2.5 cm parapelvic cyst. The left renal collecting system is not dilated. No hydronephrosis. There are no renal calculi. The bladder demonstrates no focal wall abnormality.     Bilateral renal cysts.

## 2025-04-25 ENCOUNTER — HOSPITAL ENCOUNTER (OUTPATIENT)
Dept: RADIOLOGY | Facility: MEDICAL CENTER | Age: 75
End: 2025-04-25
Attending: PHYSICIAN ASSISTANT
Payer: MEDICARE

## 2025-04-25 ENCOUNTER — RESULTS FOLLOW-UP (OUTPATIENT)
Dept: URGENT CARE | Facility: PHYSICIAN GROUP | Age: 75
End: 2025-04-25

## 2025-04-25 ENCOUNTER — OFFICE VISIT (OUTPATIENT)
Dept: URGENT CARE | Facility: PHYSICIAN GROUP | Age: 75
End: 2025-04-25
Payer: MEDICARE

## 2025-04-25 VITALS
OXYGEN SATURATION: 96 % | DIASTOLIC BLOOD PRESSURE: 88 MMHG | HEART RATE: 97 BPM | WEIGHT: 220 LBS | BODY MASS INDEX: 37.56 KG/M2 | HEIGHT: 64 IN | TEMPERATURE: 98.3 F | RESPIRATION RATE: 16 BRPM | SYSTOLIC BLOOD PRESSURE: 126 MMHG

## 2025-04-25 DIAGNOSIS — L03.90 CELLULITIS OF SKIN: ICD-10-CM

## 2025-04-25 DIAGNOSIS — R22.42 LOCALIZED SWELLING OF LEFT LOWER LEG: ICD-10-CM

## 2025-04-25 PROCEDURE — 3079F DIAST BP 80-89 MM HG: CPT | Performed by: PHYSICIAN ASSISTANT

## 2025-04-25 PROCEDURE — 93971 EXTREMITY STUDY: CPT | Mod: LT

## 2025-04-25 PROCEDURE — 3074F SYST BP LT 130 MM HG: CPT | Performed by: PHYSICIAN ASSISTANT

## 2025-04-25 PROCEDURE — 99214 OFFICE O/P EST MOD 30 MIN: CPT | Performed by: PHYSICIAN ASSISTANT

## 2025-04-25 PROCEDURE — 93971 EXTREMITY STUDY: CPT | Mod: 26,LT | Performed by: INTERNAL MEDICINE

## 2025-04-25 RX ORDER — SULFAMETHOXAZOLE AND TRIMETHOPRIM 800; 160 MG/1; MG/1
1 TABLET ORAL 2 TIMES DAILY
Qty: 14 TABLET | Refills: 0 | Status: SHIPPED | OUTPATIENT
Start: 2025-04-25 | End: 2025-05-02

## 2025-04-25 ASSESSMENT — ENCOUNTER SYMPTOMS
COUGH: 0
CHILLS: 0
FEVER: 0
ABDOMINAL PAIN: 0
MYALGIAS: 0
BRUISES/BLEEDS EASILY: 0
PALPITATIONS: 0
BACK PAIN: 0
VOMITING: 0
LEG PAIN: 1
NAUSEA: 0
NECK PAIN: 0

## 2025-04-25 ASSESSMENT — FIBROSIS 4 INDEX: FIB4 SCORE: 1.51

## 2025-04-25 NOTE — PROGRESS NOTES
Subjective:     Donna Palmer  is a 74 y.o. female who presents for Leg Pain (Left leg pain, unable to put weight on leg x1 week )       The patient is a 74 year old female with history of polio, breast cancer, and venous insufficiency presenting for left calf pain for 1 week. She reports poor ambulation at baseline given her history of polio. Her calf pain began last week she was on a road trip to [place]. She states that the pain hurts when she walks and describes it as a cramping pain. She notes increased swelling over the left lower calf, increased discoloration and increased skin firmness. She denies any new trauma, fever, chills, SOB, cough.    Leg Pain  Pertinent negatives include no abdominal pain, chest pain, chills, coughing, fever, myalgias, nausea, neck pain, rash or vomiting.      Review of Systems   Constitutional:  Negative for chills and fever.   Respiratory:  Negative for cough.    Cardiovascular:  Positive for leg swelling. Negative for chest pain and palpitations.   Gastrointestinal:  Negative for abdominal pain, nausea and vomiting.   Musculoskeletal:  Negative for back pain, joint pain, myalgias and neck pain.   Skin:  Negative for itching and rash.   Endo/Heme/Allergies:  Does not bruise/bleed easily.      Allergies   Allergen Reactions    Penicillin G Hives     As a child    Tetracycline Vomiting     Past Medical History:   Diagnosis Date    Acute pain of left knee 10/24/2023    Allergy     Anesthesia MAY 2010    NAUSEA AND VOMITTING AFTER COLONOSCOPY    Cancer ([facility]) MARCH 2023 APRIL 04, 2023 SURGERY TO REMOVE LUMP IN LEFT BREAST    Chronic venous insufficiency     Hyperlipidemia 12/21/2023    Malignant neoplasm of upper-inner quadrant of left female breast ([facility])     Morbid obesity with BMI of 40.0-44.9, adult ([facility]) 08/08/2023    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   HENT:      Head: Normocephalic and atraumatic.   Eyes:      Pupils: Pupils are equal, round, and reactive to light.   Pulmonary:      Effort: Pulmonary effort is normal.      Breath sounds: Normal breath sounds.   Musculoskeletal:      Right lower leg: Swelling present. No edema.      Left lower leg: Swelling present. 1+ Edema present.        Legs:       Comments: Examination of the left lower leg does reveal localized skin discoloration, induration and tenderness to palpation of the above marked region.  Other parts of the bilateral lower extremities do have chronic venous stasis skin color changes but the area of current symptoms is a different color as compared to the other regions.  Pain with both dorsiflexion and plantarflexion   Skin:     General: Skin is warm and dry.   Neurological:      Mental Status: She is alert.             Diagnostic testing:    Stat left lower extremity venous Doppler ultrasound  Radiologist FINDINGS:   Left lower extremity.    All veins demonstrate complete color filling and compressibility with    normal venous flow dynamics including spontaneous flow and respiratory    phasicity.    No deep venous thrombosis.     Assessment/Plan:     Encounter Diagnoses   Name Primary?    Localized swelling of left lower leg      Plan:  Obtained left lower extremity venous Doppler ultrasound due to concerns of acute DVT based on recent prolonged immobilization during car ride, history of cancer and sedentary lifestyle due to polio.  This ultrasound was negative for DVT.  Due to the localized skin changes, skin induration and discomfort with palpation over the affected " skin we will start on Bactrim for concern of cellulitis, chose this antibiotic based on allergylist.  Most recent GFR from 2/7/2025 was 87.  Vital signs were stable during today's office visit, patient was overall well-appearing. Continue to monitor symptoms and return to urgent care or follow-up with primary care provider if symptoms remain ongoing.  Follow-up in the emergency department if symptoms become severe, ER precautions discussed in office today.  Prescription for Bactrim provided.    See AVS Instructions below for written guidance provided to patient on after-visit management and care in addition to our verbal discussion during the visit.    Please note that this dictation was created using voice recognition software. I have attempted to correct all errors, but there may be sound-alike, spelling, grammar and possibly content errors that I did not discover before finalizing the note.    Joaquin Nassar PA-C    This office visit was seen in conjunction with Sridhar VEES2

## 2025-04-28 ENCOUNTER — TELEPHONE (OUTPATIENT)
Dept: HEMATOLOGY ONCOLOGY | Facility: MEDICAL CENTER | Age: 75
End: 2025-04-28
Payer: MEDICARE

## 2025-04-28 NOTE — TELEPHONE ENCOUNTER
Called and spoke to patient to see if she would prefer a follow up or call to receive results for her scheduled BX on 5/14. Pt stated that her mother is ill and my be needing to take off and at this time she feels like she is unable to commit to an appointment or call. Pt stated that she would like to see her results on MyChart first and then if she needs clarification or review of the results she will reach out to the office to schedule or ask for a call. Informed patient that I would update the provider of this request. Pt verbalized understanding.

## 2025-04-28 NOTE — TELEPHONE ENCOUNTER
Left VM for patient to schedule follow up on 5/19 for results of her Thyroid Biopsy. Waiting on pt to return phone call

## 2025-05-14 ENCOUNTER — HOSPITAL ENCOUNTER (OUTPATIENT)
Dept: RADIOLOGY | Facility: MEDICAL CENTER | Age: 75
End: 2025-05-14
Attending: NURSE PRACTITIONER
Payer: MEDICARE

## 2025-05-14 DIAGNOSIS — E04.1 THYROID NODULE: ICD-10-CM

## 2025-05-14 PROCEDURE — 88173 CYTOPATH EVAL FNA REPORT: CPT | Mod: 26 | Performed by: PATHOLOGY

## 2025-05-14 PROCEDURE — 60100 BIOPSY OF THYROID: CPT

## 2025-05-14 PROCEDURE — 88173 CYTOPATH EVAL FNA REPORT: CPT | Performed by: PATHOLOGY

## 2025-05-14 NOTE — PROGRESS NOTES
US guided right thyroid nodule fine needle aspiration done by Lindsey GARDUNO ; NON-SEDATION (no H&P required as this is a NON SEDATION procedure) right anterior aspect of neck access site, dressing CDI; 3 needles washed in 1 jar of cytolyt obtained, 2 needles washed in 1 vial afirma obtained and sent to lab. Pt tolerated the procedure well. Pt hemodynamically stable pre/intra/post procedure; all questions and concerns answered prior to being d/c; patient provided with appropriate education for procedure; pt d/c home.

## 2025-05-15 LAB — CYTOLOGY REG CYTOL: NORMAL

## 2025-05-25 ENCOUNTER — OFFICE VISIT (OUTPATIENT)
Dept: URGENT CARE | Facility: PHYSICIAN GROUP | Age: 75
End: 2025-05-25
Payer: MEDICARE

## 2025-05-25 ENCOUNTER — APPOINTMENT (OUTPATIENT)
Dept: RADIOLOGY | Facility: MEDICAL CENTER | Age: 75
End: 2025-05-25
Attending: EMERGENCY MEDICINE
Payer: MEDICARE

## 2025-05-25 ENCOUNTER — HOSPITAL ENCOUNTER (EMERGENCY)
Facility: MEDICAL CENTER | Age: 75
End: 2025-05-25
Attending: EMERGENCY MEDICINE
Payer: MEDICARE

## 2025-05-25 VITALS
HEART RATE: 87 BPM | WEIGHT: 210.1 LBS | HEIGHT: 64 IN | TEMPERATURE: 98 F | SYSTOLIC BLOOD PRESSURE: 168 MMHG | DIASTOLIC BLOOD PRESSURE: 75 MMHG | RESPIRATION RATE: 18 BRPM | BODY MASS INDEX: 35.87 KG/M2 | OXYGEN SATURATION: 94 %

## 2025-05-25 VITALS
HEIGHT: 64 IN | SYSTOLIC BLOOD PRESSURE: 126 MMHG | TEMPERATURE: 97.5 F | RESPIRATION RATE: 20 BRPM | WEIGHT: 210 LBS | HEART RATE: 78 BPM | OXYGEN SATURATION: 93 % | BODY MASS INDEX: 35.85 KG/M2 | DIASTOLIC BLOOD PRESSURE: 72 MMHG

## 2025-05-25 DIAGNOSIS — R60.0 LOWER EXTREMITY EDEMA: ICD-10-CM

## 2025-05-25 DIAGNOSIS — L97.229 VENOUS STASIS ULCER OF LEFT CALF, UNSPECIFIED ULCER STAGE, UNSPECIFIED WHETHER VARICOSE VEINS PRESENT (HCC): Primary | ICD-10-CM

## 2025-05-25 DIAGNOSIS — L03.116 CELLULITIS OF LEFT LOWER EXTREMITY: Primary | ICD-10-CM

## 2025-05-25 DIAGNOSIS — I83.022 VENOUS STASIS ULCER OF LEFT CALF, UNSPECIFIED ULCER STAGE, UNSPECIFIED WHETHER VARICOSE VEINS PRESENT (HCC): Primary | ICD-10-CM

## 2025-05-25 LAB
ALBUMIN SERPL BCP-MCNC: 4 G/DL (ref 3.2–4.9)
ALBUMIN/GLOB SERPL: 1.3 G/DL
ALP SERPL-CCNC: 92 U/L (ref 30–99)
ALT SERPL-CCNC: 20 U/L (ref 2–50)
ANION GAP SERPL CALC-SCNC: 13 MMOL/L (ref 7–16)
AST SERPL-CCNC: 26 U/L (ref 12–45)
BASOPHILS # BLD AUTO: 0.5 % (ref 0–1.8)
BASOPHILS # BLD: 0.04 K/UL (ref 0–0.12)
BILIRUB SERPL-MCNC: 0.4 MG/DL (ref 0.1–1.5)
BUN SERPL-MCNC: 16 MG/DL (ref 8–22)
CALCIUM ALBUM COR SERPL-MCNC: 9.4 MG/DL (ref 8.5–10.5)
CALCIUM SERPL-MCNC: 9.4 MG/DL (ref 8.5–10.5)
CHLORIDE SERPL-SCNC: 107 MMOL/L (ref 96–112)
CO2 SERPL-SCNC: 22 MMOL/L (ref 20–33)
CREAT SERPL-MCNC: 0.74 MG/DL (ref 0.5–1.4)
CRP SERPL HS-MCNC: 1.05 MG/DL (ref 0–0.75)
EOSINOPHIL # BLD AUTO: 0.07 K/UL (ref 0–0.51)
EOSINOPHIL NFR BLD: 0.9 % (ref 0–6.9)
ERYTHROCYTE [DISTWIDTH] IN BLOOD BY AUTOMATED COUNT: 45.3 FL (ref 35.9–50)
ERYTHROCYTE [SEDIMENTATION RATE] IN BLOOD BY WESTERGREN METHOD: 31 MM/HOUR (ref 0–25)
GFR SERPLBLD CREATININE-BSD FMLA CKD-EPI: 86 ML/MIN/1.73 M 2
GLOBULIN SER CALC-MCNC: 3.2 G/DL (ref 1.9–3.5)
GLUCOSE SERPL-MCNC: 105 MG/DL (ref 65–99)
HCT VFR BLD AUTO: 39.4 % (ref 37–47)
HGB BLD-MCNC: 12.8 G/DL (ref 12–16)
IMM GRANULOCYTES # BLD AUTO: 0.01 K/UL (ref 0–0.11)
IMM GRANULOCYTES NFR BLD AUTO: 0.1 % (ref 0–0.9)
LYMPHOCYTES # BLD AUTO: 1.74 K/UL (ref 1–4.8)
LYMPHOCYTES NFR BLD: 22.7 % (ref 22–41)
MCH RBC QN AUTO: 29 PG (ref 27–33)
MCHC RBC AUTO-ENTMCNC: 32.5 G/DL (ref 32.2–35.5)
MCV RBC AUTO: 89.1 FL (ref 81.4–97.8)
MONOCYTES # BLD AUTO: 0.52 K/UL (ref 0–0.85)
MONOCYTES NFR BLD AUTO: 6.8 % (ref 0–13.4)
NEUTROPHILS # BLD AUTO: 5.27 K/UL (ref 1.82–7.42)
NEUTROPHILS NFR BLD: 69 % (ref 44–72)
NRBC # BLD AUTO: 0 K/UL
NRBC BLD-RTO: 0 /100 WBC (ref 0–0.2)
PLATELET # BLD AUTO: 251 K/UL (ref 164–446)
PMV BLD AUTO: 9.6 FL (ref 9–12.9)
POTASSIUM SERPL-SCNC: 4.1 MMOL/L (ref 3.6–5.5)
PROT SERPL-MCNC: 7.2 G/DL (ref 6–8.2)
RBC # BLD AUTO: 4.42 M/UL (ref 4.2–5.4)
SODIUM SERPL-SCNC: 142 MMOL/L (ref 135–145)
WBC # BLD AUTO: 7.7 K/UL (ref 4.8–10.8)

## 2025-05-25 PROCEDURE — 85652 RBC SED RATE AUTOMATED: CPT

## 2025-05-25 PROCEDURE — 93922 UPR/L XTREMITY ART 2 LEVELS: CPT

## 2025-05-25 PROCEDURE — 3074F SYST BP LT 130 MM HG: CPT | Performed by: NURSE PRACTITIONER

## 2025-05-25 PROCEDURE — 86140 C-REACTIVE PROTEIN: CPT

## 2025-05-25 PROCEDURE — 99213 OFFICE O/P EST LOW 20 MIN: CPT | Performed by: NURSE PRACTITIONER

## 2025-05-25 PROCEDURE — 93971 EXTREMITY STUDY: CPT | Mod: LT

## 2025-05-25 PROCEDURE — 36415 COLL VENOUS BLD VENIPUNCTURE: CPT

## 2025-05-25 PROCEDURE — 3078F DIAST BP <80 MM HG: CPT | Performed by: NURSE PRACTITIONER

## 2025-05-25 PROCEDURE — 93922 UPR/L XTREMITY ART 2 LEVELS: CPT | Mod: 26 | Performed by: INTERNAL MEDICINE

## 2025-05-25 PROCEDURE — 99284 EMERGENCY DEPT VISIT MOD MDM: CPT

## 2025-05-25 PROCEDURE — 80053 COMPREHEN METABOLIC PANEL: CPT

## 2025-05-25 PROCEDURE — 85025 COMPLETE CBC W/AUTO DIFF WBC: CPT

## 2025-05-25 PROCEDURE — 93971 EXTREMITY STUDY: CPT | Mod: 26,LT | Performed by: INTERNAL MEDICINE

## 2025-05-25 RX ORDER — CEPHALEXIN 500 MG/1
500 CAPSULE ORAL 4 TIMES DAILY
Qty: 28 CAPSULE | Refills: 0 | Status: ACTIVE | OUTPATIENT
Start: 2025-05-25 | End: 2025-06-01

## 2025-05-25 ASSESSMENT — FIBROSIS 4 INDEX
FIB4 SCORE: 1.51
FIB4 SCORE: 1.51

## 2025-05-25 NOTE — ED NOTES
Pt wheeled to room, able to transfer self to [facility] with 1 person assist.     Pt completed abx treatment ~3 weeks for left lower leg pain.

## 2025-05-25 NOTE — ED TRIAGE NOTES
"Donna Maria De Jesus Palmer  74 y.o. female  Chief Complaint   Patient presents with    Leg Pain     Reports 7/10 \"pressure\" like LLE pain since aprox 5/1. Reports pain began after a road trip.  Reports discoloration of LLE    Sent from Urgent Care     Per transfer notes:  Venous insufficiency  Skin in last 3 days changed color, increased pain, decreased sensation         Pt amb to triage via personal motorized scooter. Pt unable to stand on scale  Pt is alert and oriented, speaking in full sentences, follows commands and responds appropriately to questions. Not in any apparent distress. Respirations are even and unlabored.  Pt placed in ED Lobby. Pt educated on triage process. Pt encouraged to alert staff for any changes.    "

## 2025-05-25 NOTE — ED PROVIDER NOTES
"ER Provider Note    Scribed for Pietro Deluna Ii, M.d. by Albania Miller. 5/25/2025  3:09 PM    Primary Care Provider: AUGUSTA Gallego    CHIEF COMPLAINT   Chief Complaint   Patient presents with    Leg Pain     Reports 7/10 \"pressure\" like LLE pain since aprox 5/1. Reports pain began after a road trip.  Reports discoloration of LLE    Sent from Urgent Care     Per transfer notes:  Venous insufficiency  Skin in last 3 days changed color, increased pain, decreased sensation       EXTERNAL RECORDS REVIEWED  Outpatient Notes Patient was seen at [facility] earlier today for leg pain.    HPI/ROS  LIMITATION TO HISTORY   Select: : None  OUTSIDE HISTORIAN(S):  None    Donna Palmer is a 74 y.o. female with history of polio and breast cancer not currently on chemotherapy who was sent to the ED from urgent care for left lower extremity pain that started after a road-trip. Patient states that she was previously seen at urgent care for her symptoms and had an ultrasound to rule out blood clot. She was then prescribed a 7 day course of Bactrim for a leg possible infection which she has since finished. She states that the severity of the \"piercing\" pain improved but that now she is having a \"tight\" pain. Today patient was seen at urgent care again and advised to go to the ER due to concerns for venous insufficiency. Patient states that due to her pain and swelling she is concerned that her leg will give out when she stands up. Patient denies right lower extremity pain and any other symptoms or injuries at this time.     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;  Service: General    NODE BIOPSY SENTINEL Left 2023    Procedure: BIOPSY, LYMPH NODE, SENTINEL;  Surgeon: Lubna Donaldson M.D.;  Location: SURGERY SAME DAY [facility];  Service: General    ORIF, ELBOW Right 2008    LUMPECTOMY      OTHER ORTHOPEDIC SURGERY  MAY 2010    FRACTURED RIGHT ELBOW    PRIMARY C SECTION         FAMILY HISTORY  Family History   Problem Relation Age of Onset    Kidney Disease Father          93    No Known Problems Brother     Breast Cancer Maternal Aunt     Cancer Maternal Aunt         PASSED AWAY FROM BREAST CANCER    Cancer Maternal Aunt         BREAST CANCER - IN REMISSION    Breast Cancer Maternal Aunt     Clotting Disorder Neg Hx     Stroke Neg Hx     Heart Attack Neg Hx     Tubal Cancer Neg Hx     Ovarian Cancer Neg Hx     Peritoneal Cancer Neg Hx     Colorectal Cancer Neg Hx     Diabetes Neg Hx     Heart Disease Neg Hx     Hypertension Neg Hx     Hyperlipidemia Neg Hx      SOCIAL HISTORY   reports that she has never smoked. She has never used smokeless tobacco. She reports that she does not drink alcohol and does not use drugs.    CURRENT MEDICATIONS  Discharge Medication List as of 2025  6:25 PM        CONTINUE these medications which have NOT CHANGED    Details   anastrozole (ARIMIDEX) 1 MG Tab Take 1 Tablet by mouth every day., Disp-90 Tablet, R-1, Normal      Multiple Vitamins-Minerals (MULTIVITAMIN ADULT PO) Take  by  "mouth., Historical Med           ALLERGIES  Penicillin g and Tetracycline    PHYSICAL EXAM  BP (!) 173/82   Pulse 90   Temp 37.4 °C (99.3 °F) (Temporal)   Resp 18   Ht 1.626 m (5' 4\")   Wt 95.3 kg (210 lb 1.6 oz)   SpO2 97%   BMI 36.06 kg/m²   Physical Exam  Vitals and nursing note reviewed.   Constitutional:       Appearance: Normal appearance.   Cardiovascular:      Rate and Rhythm: Normal rate and regular rhythm.      Comments: Palpable bilateral foot and ankle pulses.   Abdominal:      General: There is no distension.      Tenderness: There is no abdominal tenderness.   Musculoskeletal:      Comments: Skin darkening to bilateral calves with left more prominent than right. See photos. Left calf with edema and tenderness. Right foot drop.    Skin:     Comments: No open wounds   Neurological:      Mental Status: She is alert.      Comments: Chronic lower extremity weakness from polio.    Psychiatric:         Mood and Affect: Mood normal.                 DIAGNOSTIC STUDIES    EKG/LABS  Results for orders placed or performed during the hospital encounter of 05/25/25   CRP QUANTITIVE (NON-CARDIAC)    Collection Time: 05/25/25  4:52 PM   Result Value Ref Range    Stat C-Reactive Protein 1.05 (H) 0.00 - 0.75 mg/dL   Sed Rate    Collection Time: 05/25/25  4:52 PM   Result Value Ref Range    Sed Rate Westergren 31 (H) 0 - 25 mm/hour   Comp Metabolic Panel    Collection Time: 05/25/25  4:52 PM   Result Value Ref Range    Sodium 142 135 - 145 mmol/L    Potassium 4.1 3.6 - 5.5 mmol/L    Chloride 107 96 - 112 mmol/L    Co2 22 20 - 33 mmol/L    Anion Gap 13.0 7.0 - 16.0    Glucose 105 (H) 65 - 99 mg/dL    Bun 16 8 - 22 mg/dL    Creatinine 0.74 0.50 - 1.40 mg/dL    Calcium 9.4 8.5 - 10.5 mg/dL    Correct Calcium 9.4 8.5 - 10.5 mg/dL    AST(SGOT) 26 12 - 45 U/L    ALT(SGPT) 20 2 - 50 U/L    Alkaline Phosphatase 92 30 - 99 U/L    Total Bilirubin 0.4 0.1 - 1.5 mg/dL    Albumin 4.0 3.2 - 4.9 g/dL    Total Protein 7.2 6.0 - " 8.2 g/dL    Globulin 3.2 1.9 - 3.5 g/dL    A-G Ratio 1.3 g/dL   CBC WITH DIFFERENTIAL    Collection Time: 05/25/25  4:52 PM   Result Value Ref Range    WBC 7.7 4.8 - 10.8 K/uL    RBC 4.42 4.20 - 5.40 M/uL    Hemoglobin 12.8 12.0 - 16.0 g/dL    Hematocrit 39.4 37.0 - 47.0 %    MCV 89.1 81.4 - 97.8 fL    MCH 29.0 27.0 - 33.0 pg    MCHC 32.5 32.2 - 35.5 g/dL    RDW 45.3 35.9 - 50.0 fL    Platelet Count 251 164 - 446 K/uL    MPV 9.6 9.0 - 12.9 fL    Neutrophils-Polys 69.00 44.00 - 72.00 %    Lymphocytes 22.70 22.00 - 41.00 %    Monocytes 6.80 0.00 - 13.40 %    Eosinophils 0.90 0.00 - 6.90 %    Basophils 0.50 0.00 - 1.80 %    Immature Granulocytes 0.10 0.00 - 0.90 %    Nucleated RBC 0.00 0.00 - 0.20 /100 WBC    Neutrophils (Absolute) 5.27 1.82 - 7.42 K/uL    Lymphs (Absolute) 1.74 1.00 - 4.80 K/uL    Monos (Absolute) 0.52 0.00 - 0.85 K/uL    Eos (Absolute) 0.07 0.00 - 0.51 K/uL    Baso (Absolute) 0.04 0.00 - 0.12 K/uL    Immature Granulocytes (abs) 0.01 0.00 - 0.11 K/uL    NRBC (Absolute) 0.00 K/uL   ESTIMATED GFR    Collection Time: 05/25/25  4:52 PM   Result Value Ref Range    GFR (CKD-EPI) 86 >60 mL/min/1.73 m 2     RADIOLOGY/PROCEDURES       Radiologist interpretation:  US-MONICA SINGLE LEVEL BILAT   Final Result      US-EXTREMITY VENOUS LOWER UNILAT LEFT   Final Result          COURSE & MEDICAL DECISION MAKING     ASSESSMENT, COURSE AND PLAN  Care Narrative: 3:12 PM 74-year-old female with history of polio and breast cancer not currently on chemotherapy who presents to the ED from urgent care for further evaluation of left lower extremity pain that began after a long road-trip. Sent here for imaging of her lower extremity with ultrasound. Will check for PAD, DVT.  Ordered for CBC with diff, CMP, US-MONICA single level bilat, US-extremity venous lower unilat left, and CRP quantitive to evaluate her symptoms. Could also be cellulitis. No open wounds on leg, skin changes could also be from venous insuffiency.     6:14 PM -  Patient was reevaluated at bedside. Discussed results with patient and informed her that imaging shows no signs of occlusion of arteries or DVT.  Inflammatory markers are borderline increased. There is warmth and more edema at the left leg (affected leg) compared to the right. She was treated with bactrim and she said there was some improvement but recently worsening. Will cover for cellulitis with keflex. No signs of purulence or abscess. I believe there is also component of venous stasis and this is where most of the skin changes are from.  I discussed with patient plan to put in a cardiology referral as vascular surgery is not accepting patients with venous insufficiency at this time. Encouraged patient to elevated her legs at night. Patient was given the opportunity for questions which were answered appropriately.  Patient is to return to the ED for new or worsening symptom or any additional concerns.  Patient has verbalized understanding and agreement to this plan. Patient is stable for discharge at this time.         PROBLEM LIST  #Left leg cellulitis    #Venous insufficiency   -discussed compression, elevation   -follow up with cardiology clinic (vascular not taking  venous insufficiency cases)    DISPOSITION AND DISCUSSIONS  I have discussed management of the patient with the following physicians and FRANK's:  None.     Discussion of management with other QHP or appropriate source(s): None     Barriers to care at this time, including but not limited to: None.     DISPOSITION:  Patient will be discharged home in stable condition.    FOLLOW UP:  [facility], Emergency Dept  [address] [facility] [address] [phone]    fever, spreading redness or other serious concerns      OUTPATIENT MEDICATIONS:  Discharge Medication List as of 5/25/2025  6:25 PM        START taking these medications    Details   cephALEXin (KEFLEX) 500 MG Cap Take 1 Capsule by mouth 4 times a day for 7 days.,  Disp-28 Capsule, R-0, Normal           FINAL DIAGNOSIS  1. Cellulitis of left lower extremity    2. Lower extremity edema       IAlbania (Scribe), am scribing for, and in the presence of, Pietro Deluna II, MD.    Electronically signed by: Albania Miller (Scribe), 5/25/2025    Pietro DUGAN II, MD personally performed the services described in this documentation, as scribed by Albania Miller in my presence, and it is both accurate and complete.         The note accurately reflects work and decisions made by me.  Pietro Deluna II, M.D.  5/25/2025  9:28 PM

## 2025-05-26 NOTE — ED NOTES
"Pt discharged home. Pt in possession of belongings. Pt provided discharge education and information pertaining to medications and follow up appointments. Pt received copy of discharge instructions and verbalized understanding. BP (!) 168/75   Pulse 87   Temp 36.7 °C (98 °F) (Temporal)   Resp 18   Ht 1.626 m (5' 4\")   Wt 95.3 kg (210 lb 1.6 oz)   SpO2 94%   BMI 36.06 kg/m²     "

## 2025-05-28 ENCOUNTER — TELEPHONE (OUTPATIENT)
Dept: VASCULAR LAB | Facility: MEDICAL CENTER | Age: 75
End: 2025-05-28
Payer: MEDICARE

## 2025-05-28 ENCOUNTER — TELEPHONE (OUTPATIENT)
Dept: HEALTH INFORMATION MANAGEMENT | Facility: OTHER | Age: 75
End: 2025-05-28
Payer: MEDICARE

## 2025-05-28 NOTE — TELEPHONE ENCOUNTER
----- Message from Physician Michael Bloch, M.D. sent at 5/27/2025  8:22 PM PDT -----  Regarding: urgent initial [facility]  Looks like [facility] has a few openings this week. Pls try and get her in one of them.Otherwise first avaialble - 8 am okMb

## 2025-05-28 NOTE — TELEPHONE ENCOUNTER
Left message for patient to call back and schedule urgent referral with Dr. Merlos. 8 am ok.           Jordyn Caal, Medical Assistant   [facility]   Ph: [phone]  Fx: [phone]

## 2025-05-28 NOTE — Clinical Note
REFERRAL APPROVAL NOTICE         Sent on May 28, 2025                   Donna Palmer  [address]  [facility] [address]                   Dear Ms. Palmer,    After a careful review of the medical information and benefit coverage, [facility] has processed your referral. See below for additional details.    If applicable, you must be actively enrolled with your insurance for coverage of the authorized service. If you have any questions regarding your coverage, please contact your insurance directly.    REFERRAL INFORMATION   Referral #:  [id number]  Referred-To Department    Referred-By Provider:  Vascular Medicine    AUGUSTA Dixon   Vascular Medicine      [address]  [address]  [facility] [address]  [phone] [address] [place] [id number]  [phone]    Referral Start Date:  05/25/2025  Referral End Date:   05/25/2026             SCHEDULING  If you do not already have an appointment, please call [phone] to make an appointment.     MORE INFORMATION  If you do not already have a [employer] account, sign up at: [email or web address]  You can access your medical information, make appointments, see lab results, billing information, and more.  If you have questions regarding this referral, please contact  the [facility] Referrals department at:             [phone]. Monday - Friday 8:00AM - 5:00PM.     Sincerely,    [facility]

## 2025-06-02 ENCOUNTER — TELEPHONE (OUTPATIENT)
Dept: VASCULAR LAB | Facility: MEDICAL CENTER | Age: 75
End: 2025-06-02
Payer: MEDICARE

## 2025-06-02 NOTE — TELEPHONE ENCOUNTER
Called and left message to schedule URGENT initial appt with Vascular Medicine - 8AM appt OK with Dr Sree Merlos.    Please schedule accordingly.  If unable, please contact Vascular MA to schedule.      From: Michael J Bloch, M.D.   Sent: 5/27/2025   8:23 PM PDT   To: Vascular Medicine [facility]   Subject: urgent initial [facility]                                Looks like [facility] has a few openings this week.   Pls try and get her in one of them.   Otherwise first avaialble - 8 am ok   Malcolm Waterman, Med Ass't  [facility]  Ph. [phone]  Fx. [phone]

## 2025-06-02 NOTE — TELEPHONE ENCOUNTER
[facility]  Caller: Donna Palmer    Topic/issue: Patient would like to see anybody else, not Dr. Sree Merlos. Patient can only be seen in the afternoon as her transportation is only available in the afternoons, around 2:00PM would be best.     Callback Number: [phone]    Thank you,  Lisa GO

## 2025-06-03 NOTE — TELEPHONE ENCOUNTER
Called and left message to schedule initial with Dr Bloch.  Please contact Vascular MA to schedule. Patient declined a sooner initial with Dr. ANDRÉS Waterman, Med Ass't  [facility]  Ph. [phone]  Fx. [phone]

## 2025-06-05 DIAGNOSIS — E04.1 THYROID NODULE: Primary | ICD-10-CM

## 2025-06-05 NOTE — PROGRESS NOTES
Patient was an ultrasound of the thyroid completed on 4/8/2025 shows a 2.2 x 2.8 cm thyroid nodule.  She did undergo biopsy on 5/14/2025 which was nondiagnostic.  Discussed with patient and she would like to repeat biopsy.  Will wait 6 weeks from the original on 5/14/2025 and request repeat biopsy towards the end of June.  Will contact patient with results.    1. Thyroid nodule  US-NEEDLE BX-THYROID

## 2025-06-17 ENCOUNTER — TELEPHONE (OUTPATIENT)
Dept: HEMATOLOGY ONCOLOGY | Facility: MEDICAL CENTER | Age: 75
End: 2025-06-17
Payer: MEDICARE

## 2025-06-17 NOTE — TELEPHONE ENCOUNTER
Reached out to Neena in IR about Auth for Biopsy. Was directed to pre [place] at [id number]. Pre admit looked at this case and stated that it has not been reviewed or worked on yet but to her understanding there should be no Auth required for the codes / type of procedure with Medicare. Pre admit stated that they have been short staffed and will hopefully be getting to this by next week or so, and that they do reach out to the patients about a week prior to go over prep and instruction and at this time would review if this wouldn't be approved.  Called pt to inform her of this, she was thankful for the update and verbalized understanding .

## 2025-07-01 ENCOUNTER — HOSPITAL ENCOUNTER (OUTPATIENT)
Dept: RADIOLOGY | Facility: MEDICAL CENTER | Age: 75
End: 2025-07-01
Attending: NURSE PRACTITIONER
Payer: MEDICARE

## 2025-07-01 DIAGNOSIS — E04.1 THYROID NODULE: ICD-10-CM

## 2025-07-01 LAB — CYTOLOGY REG CYTOL: NORMAL

## 2025-07-01 PROCEDURE — 60100 BIOPSY OF THYROID: CPT

## 2025-07-01 PROCEDURE — 88173 CYTOPATH EVAL FNA REPORT: CPT | Performed by: PATHOLOGY

## 2025-07-01 NOTE — PROGRESS NOTES
US guided right thyroid nodule fine needle aspiration done by Dr. Clark; NON-SEDATION (no H&P required as this is a NON SEDATION procedure) right anterior aspect of neck access site, dressing CDI; x3 passes in 1 jar of cytolyt obtained, x2 passes in 1 vial afirma obtained and sent to lab. Pt tolerated the procedure well. Pt hemodynamically stable pre/intra/post procedure; all questions and concerns answered prior to being d/c; patient provided with appropriate education for procedure; pt d/c home.

## 2025-07-15 ENCOUNTER — TELEPHONE (OUTPATIENT)
Dept: VASCULAR LAB | Facility: MEDICAL CENTER | Age: 75
End: 2025-07-15
Payer: MEDICARE

## 2025-07-15 NOTE — TELEPHONE ENCOUNTER
Established patient  Chart prep for upcoming appointment.    Any pending/incomplete orders from last visit? No, all orders completed.  Was patient called and reminded to complete pending orders? N/A orders complete  Were any records requested?  No    Referral up to date? Yes  Referral attached to appointment (renewals and New patients only)? N/A (established with up-to-date referral)  Virtual appointment? No    Yesenia Waterman, Med Ass't  [facility]  Ph. [phone]  Fx. [phone]

## 2025-07-22 ENCOUNTER — OFFICE VISIT (OUTPATIENT)
Facility: MEDICAL CENTER | Age: 75
End: 2025-07-22
Attending: INTERNAL MEDICINE
Payer: MEDICARE

## 2025-07-22 VITALS
HEIGHT: 64 IN | BODY MASS INDEX: 35.85 KG/M2 | SYSTOLIC BLOOD PRESSURE: 129 MMHG | HEART RATE: 87 BPM | WEIGHT: 210 LBS | DIASTOLIC BLOOD PRESSURE: 65 MMHG

## 2025-07-22 DIAGNOSIS — R73.03 PREDIABETES: ICD-10-CM

## 2025-07-22 DIAGNOSIS — I10 ESSENTIAL HYPERTENSION, BENIGN: ICD-10-CM

## 2025-07-22 DIAGNOSIS — E78.00 PURE HYPERCHOLESTEROLEMIA: ICD-10-CM

## 2025-07-22 DIAGNOSIS — I87.2 CHRONIC VENOUS INSUFFICIENCY: Primary | ICD-10-CM

## 2025-07-22 PROCEDURE — 99214 OFFICE O/P EST MOD 30 MIN: CPT | Performed by: INTERNAL MEDICINE

## 2025-07-22 RX ORDER — PRAVASTATIN SODIUM 20 MG
20 TABLET ORAL NIGHTLY
COMMUNITY
End: 2025-07-23

## 2025-07-22 ASSESSMENT — ENCOUNTER SYMPTOMS
ORTHOPNEA: 0
MYALGIAS: 0
PALPITATIONS: 0
CHILLS: 0
BRUISES/BLEEDS EASILY: 0
WEAKNESS: 0
COUGH: 0
CLAUDICATION: 0
NAUSEA: 0
FEVER: 0

## 2025-07-22 ASSESSMENT — FIBROSIS 4 INDEX: FIB4 SCORE: 1.67

## 2025-07-22 NOTE — PROGRESS NOTES
FOLLOW UP VASCULAR VISIT  Donna Palmer is a  female  who presents 09/30/22 for   Chief Complaint   Patient presents with    Other     Re-establish from 2021  Diagnosis  I83.022,L97.229 (ICD-10-CM) - Venous stasis ulcer of left calf, unspecified ulcer stage, unspecified whether varicose veins present (HCC)           Subjective      CVI:   Interval hx/concerns:   History of right lower extremity swelling in the past  She was evaluated by Dr. Merlos and had a reflux study that showed isolated superficial vein reflux  She was referred for potential vein intervention but never had it performed because she was worried about the side effects  She has been using stockings consistently on the right leg  Swelling in the right leg is improved and there is no skin breakdown  A couple of months ago in the setting of a long car trip she developed worsening left lower extremity swelling and severe redness  She went to urgent care in May was diagnosed with cellulitis and placed on Bactrim.  There is no blood clot at that time  Bactrim was not effective and so she went back to the emergency room I months later and then was put on Keflex  Once again had no blood clot at that time  Since that time the swelling and pain have slowly gotten better  Redness improved  No fevers  She has completed her antibiotic course  No open wound  Limited walking  She is consistent with stocking on the left leg as well as the right leg now  No known history of blood clots    HTN:  Long history of mildly elevated blood pressure  Never been on medications  Not currently taking blood pressure at home    Hyperlipidemia:    Never taken a statin  Is currently on red rice yeast extract  No myalgias  Denies known ASCVD    Antiplatelet/anticoagulation: no    Sleeping disorder/FRANK:   Reports heavy snoring per , gets adequate sleep  No PSG  No problems with work day somnolence.        Current Outpatient Medications:     pravastatin, 20 mg, Oral, Nightly,  "Taking    anastrozole, 1 mg, Oral, DAILY, Taking    Multiple Vitamins-Minerals (MULTIVITAMIN ADULT PO), Take  by mouth., Taking      Social History     Tobacco Use    Smoking status: Never    Smokeless tobacco: Never    Tobacco comments:     Never smoked tobacco   Vaping Use    Vaping status: Never Used   Substance Use Topics    Alcohol use: No    Drug use: No     DIET AND EXERCISE:  Weight Change: stable   BMI Readings from Last 5 Encounters:   07/22/25 36.05 kg/m²   05/25/25 36.06 kg/m²   05/25/25 36.05 kg/m²   04/25/25 37.76 kg/m²   01/29/25 36.90 kg/m²      Diet: common adult  Exercise: no regular exercise program     Review of Systems   Constitutional:  Negative for chills and fever.   Respiratory:  Negative for cough.    Cardiovascular:  Positive for leg swelling. Negative for chest pain, palpitations, orthopnea and claudication.   Gastrointestinal:  Negative for nausea.   Musculoskeletal:  Negative for myalgias.   Neurological:  Negative for weakness.   Endo/Heme/Allergies:  Does not bruise/bleed easily.       Objective      Vitals:    07/22/25 1432 07/22/25 1438   BP: 137/64 129/65   BP Location: Right arm Right arm   Patient Position: Sitting Sitting   BP Cuff Size: Large adult Large adult   Pulse: 87 87   Weight: 95.3 kg (210 lb)    Height: 1.626 m (5' 4\")         BP Readings from Last 5 Encounters:   07/22/25 129/65   05/25/25 (!) 168/75   05/25/25 126/72   04/25/25 126/88   01/29/25 112/76      Body mass index is 36.05 kg/m².  Physical Exam  Vitals reviewed.   Constitutional:       General: She is not in acute distress.     Appearance: Normal appearance.   HENT:      Head: Normocephalic and atraumatic.   Neck:      Thyroid: No thyroid mass.      Vascular: Normal carotid pulses.      Trachea: Trachea normal.   Cardiovascular:      Rate and Rhythm: Normal rate and regular rhythm.      Chest Wall: PMI is not displaced.      Pulses: Normal pulses.           Dorsalis pedis pulses are 2+ on the right side and " 2+ on the left side.        Posterior tibial pulses are 2+ on the right side and 2+ on the left side.      Heart sounds: Normal heart sounds.      Comments: Calf lipodermatoscler changes.  Healed bilat medial leg VLUs, nontender.  Mild rubor at dorsum of R foot  Pulmonary:      Effort: Pulmonary effort is normal.      Breath sounds: Normal breath sounds.   Musculoskeletal:      Cervical back: Full passive range of motion without pain.      Right lower le+ Pitting Edema present.      Left lower leg: Edema present.      Comments: Right lower extremity is in a stocking  Left lower extremity with an area of dermatosclerosis on the both anterior and posterior aspect  Trace to 1+ edema in the left  No open wound  Good pulses   Skin:     General: Skin is warm and dry.      Capillary Refill: Capillary refill takes less than 2 seconds.      Coloration: Skin is not cyanotic.      Nails: There is no clubbing.   Neurological:      General: No focal deficit present.      Mental Status: She is alert and oriented to person, place, and time. Mental status is at baseline.      Cranial Nerves: No cranial nerve deficit.      Coordination: Coordination is intact. Coordination normal.      Gait: Gait is intact. Gait normal.   Psychiatric:         Mood and Affect: Mood normal.         Behavior: Behavior normal.         Lab Results   Component Value Date    CHOLSTRLTOT 187 2025    LDL 98 2025    HDL 66 2025    TRIGLYCERIDE 115 2025           Lab Results   Component Value Date    HBA1C 6.6 (H) 2025      Lab Results   Component Value Date    SODIUM 142 2025    POTASSIUM 4.1 2025    CHLORIDE 107 2025    CO2 22 2025    GLUCOSE 105 (H) 2025    BUN 16 2025    CREATININE 0.74 2025        Lab Results   Component Value Date    WBC 7.7 2025    RBC 4.42 2025    HEMOGLOBIN 12.8 2025    HEMATOCRIT 39.4 2025    MCV 89.1 2025    MCH 29.0 2025     MCHC 32.5 05/25/2025    MPV 9.6 05/25/2025     VASCULAR IMAGING:   Last EKG: No results found for this or any previous visit.     MONICA 12/8/2020   Ankle-brachial index is normal.     VR duplex 1/25/21    Right lower extremity - No superficial or deep venous thrombosis. No     reflux of the superficial or deep veins.    R Foot XR 8/2022  Soft tissue swelling. No acute fracture identified.    BLE VR duplex 9/15/22    CONCLUSIONS   Bilateral lower extremities no superficial or deep venous thrombosis.    Right lower extremity superficial venous reflux as described. No    significant deep venous reflux.    Left lower extremity no significant reflux of the superficial or deep    veins.   No prior bilateral lower extremity study.    Right lower extremity.    All veins demonstrate complete color filling and compressibility with    normal venous flow dynamics including spontaneous flow and respiratory    phasicity. No superficial or deep venous thrombosis.    No significant deep venous reflux.    The great saphenous vein demonstrates significant reflux in 1_ out of the 8    evaluated segments.      Left lower extremity.    All veins demonstrate complete color filling and compressibility with    normal venous flow dynamics including spontaneous flow and respiratory    phasicity. No superficial or deep venous thrombosis.    The left anterior accessory saphenous vein is not well visualized.    No significant reflux of the superficial or deep veins.    Echocardiogram 2023  Mild concentric left ventricular hypertrophy. Hyperdynamic left   ventricular systolic function. The left ventricular ejection fraction   is visually estimated to be 75%.  The right ventricle is normal in size and systolic function.  The left atrium is normal in size.  No significant valvular pathology.   No pericardial effusion.  No prior study is available for comparison.        MONICA May 2025  The ankle-brachial index is normal.     Left lower extremity  venous duplex in April 2025   Left lower extremity.    No deep venous thrombosis.     Left lower extremity venous duplex May 2025   Left lower extremity.    No deep venous thrombosis.           Medical Decision Making:  Today's Assessment / Status / Plan:     1. Chronic venous insufficiency  CBC WITHOUT DIFFERENTIAL    Comp Metabolic Panel      2. Pure hypercholesterolemia  APOLIPOPROTEIN B    Lipid Profile    Lipoprotein (a)    Comp Metabolic Panel    TSH      3. Prediabetes        4. Essential hypertension, benign  Comp Metabolic Panel    MICROALBUMIN CREAT RATIO URINE          Patient Type: Primary Prevention    Etiology of Established CVD if Present:     1) Chronic venous insufficiency  RLE CEAP class:  C4aS / Ep / As / Pr   LLE CEAP classification: C4aS or C5S / Ep / An / Pn  No signs of active ulcerations   Had cellulitis that now appears to be slowly resolving the left lower extremity  Venous symptoms improved  No DVT on multiple checks  Plan:  -Continue Goldbond moisturizer  - continue knee-high compression socks, 20-30mmHg, both legs every day  - Consider repeat reflux study and possible referral to interventional list in the future  - increase walking or pedaling is much as possible, avoid prolonged standing.  We did discuss calf pumps if she is not able to walk  - elevated legs while sitting and sleeping above heart level  - continue daily moisturizing lotion (such as [employer] Diabetic Foot Cream)  Meds:  - restart horse chestnut seed extract 300mg 2 times daily    Lipid Management:   Qualifies for Statin Therapy Based on 2018 ACC/AHA guidelines based on calculated ASCVD risk  Lipids much improved on red rice yeast extract but as we discussed I am much more comfortable with safety profile of low dose statin therapy.  Patient in agreement  Plan:  - Stop red rice yeast extract  - Start pravastatin 20 mg daily  - Recheck fasting lipid panel, lipoprotein a, a apolipoprotein B  - Adjust meds as  needed    Blood Pressure Management:  ACC/AHA (2017) goal <130/80  Home BP at goal: No  Office BP at goal: No  24h ABPM: None  Plan:   -Get new blood pressure monitor begin keeping a home log  - Given relatively low cardiovascular risk, would consider antihypertensive therapy if out of office blood pressure greater than 135/85 on average  - Check GFR, electrolytes, urine for albumin    Glycemic Status: Normal  - Recheck fasting glucose    Antiplatelet/anticoagulant therapy -not currently indicated    LIFESTYLE INTERVENTIONS:    SMOKING: Never smoker  - continued complete avoidance of all tobacco products     PHYSICAL ACTIVITY: as much walking as possible.  Agree with the stepper that she is looking into that she can do while she is seated.  Increase calf pumping    WEIGHT MANAGEMENT AND NUTRITION: Mediterranean style dietary approach       Other:     # postpolio syndrome- limited mobility, use chair exercises     Instructed to follow-up with PCP for remainder of adult medical needs: yes  We will partner with other providers in the management of established vascular disease and cardiometabolic risk factors.    Studies to Be Obtained:  None   Labs to Be Obtained: As above prior to next visit    Follow up in: 3mo    Michael J Bloch, M.D.  [facility]   [facility]   [phone]

## 2025-07-23 ENCOUNTER — PATIENT MESSAGE (OUTPATIENT)
Facility: MEDICAL CENTER | Age: 75
End: 2025-07-23
Payer: MEDICARE

## 2025-07-23 RX ORDER — PRAVASTATIN SODIUM 20 MG
20 TABLET ORAL NIGHTLY
Qty: 90 TABLET | Refills: 3 | Status: SHIPPED | OUTPATIENT
Start: 2025-07-23

## 2025-07-30 ENCOUNTER — HOSPITAL ENCOUNTER (OUTPATIENT)
Facility: MEDICAL CENTER | Age: 75
End: 2025-07-30
Attending: INTERNAL MEDICINE
Payer: MEDICARE

## 2025-07-30 VITALS
OXYGEN SATURATION: 94 % | DIASTOLIC BLOOD PRESSURE: 64 MMHG | HEART RATE: 91 BPM | SYSTOLIC BLOOD PRESSURE: 122 MMHG | TEMPERATURE: 98.7 F

## 2025-07-30 DIAGNOSIS — Z17.0 MALIGNANT NEOPLASM OF UPPER-INNER QUADRANT OF LEFT BREAST IN FEMALE, ESTROGEN RECEPTOR POSITIVE (HCC): Primary | ICD-10-CM

## 2025-07-30 DIAGNOSIS — C50.212 MALIGNANT NEOPLASM OF UPPER-INNER QUADRANT OF LEFT BREAST IN FEMALE, ESTROGEN RECEPTOR POSITIVE (HCC): Primary | ICD-10-CM

## 2025-07-30 PROCEDURE — 99212 OFFICE O/P EST SF 10 MIN: CPT | Performed by: INTERNAL MEDICINE

## 2025-07-30 ASSESSMENT — ENCOUNTER SYMPTOMS
FEVER: 0
TINGLING: 0
DIZZINESS: 0
COUGH: 0
CONSTIPATION: 0
PALPITATIONS: 0
SHORTNESS OF BREATH: 0
MYALGIAS: 0
WEIGHT LOSS: 0
NAUSEA: 0
VOMITING: 0
DIARRHEA: 0
HEADACHES: 1
CHILLS: 0

## 2025-07-30 ASSESSMENT — PAIN SCALES - GENERAL: PAINLEVEL_OUTOF10: NO PAIN

## 2025-07-30 NOTE — PROGRESS NOTES
Subjective   Medical oncology visit: 7/30/2025  Donna Palmer is a 72 y.o. female who presents with ER positive breast cancer receiving adjuvant chemotherapy with AC          HPI    Referring Physician: Lubna Donaldson MD  Primary Care:   [facility]leg[facility].     Diagnosis: Ductal carcinoma of the left breast, grade 3, clinically stage IIa (cT2, cN0) ER +95%, ME negative, HER2 IHC 2+ FISH negative, Ki-67 30%     Chief Complaint: Patient seen today for evaluation of her breast cancer, for continued monitoring of symptoms and side effects of cancer treatments, employing AC.     Oncology history of presenting illness:  Patient self detected a mass in her left breast in February 2023.  She had not had a mammogram for several years.  A diagnostic mammogram and ultrasound were completed on 3/6/2023 which showed a 2.7 x 2.4 x 2.0 cm spiculated irregular mass at 3 o'clock position of the left breast with no abnormal lymph nodes being detected.  She underwent an immediate ultrasound-guided biopsy demonstrated invasive ductal carcinoma, grade 3, at least 1.7 cm in the core, ER +95%, ME negative, Ki-67 30%, HER2 2+ IHC FISH negative.  She saw Dr. Donaldson in consultation and is planning to undergo partial mastectomy and sentinel lymph node biopsy.     Her history is notable for post polio syndrome with motor nerve impairment which is gotten gradually worse over the last couple of years.  She now has to use a scooter almost all the time and has started having impairment in activities of daily living.  Otherwise she has no chronic medical problems.  She has a maternal aunt who had breast cancer.  No other breast or ovarian cancer is noted.     Interval histories:  Interval history 4/20/2023: She underwent left partial mastectomy and sentinel lymph node biopsy on 4/4/2023.  Pathology demonstrated invasive ductal carcinoma 3.5 cm in greatest dimension with focal high-grade DCIS.  Margins were clear and there was no lymph  vascular invasion.  1 sentinel lymph node was positive for micrometastases and 1 sentinel lymph node was negative.  Postoperative course was unremarkable but she does feel a firm mass at the incision site which is consistent with a seroma in the breast.  We sent an Oncotype DX which came back with recurrence score of 39.  She has no history of heart problems but does have history of hypertension.     Interval history 05/10/23 (CAlsop, APRN):  Patient seen today for her toxicity check following her first dose of chemotherapy employing AC.  Patient appeared to tolerate treatment very well.  She has not required any antinausea medications.  She had very mild underlying nausea but again no need for medication.  She does complain of constipation which is new with treatment.  She did have a bowel movement last night.  She was quite constipated after treatment and ended up taking milk of magnesia dose on Sunday night which caused diarrhea.  She also noticed a very mild elevation in temperature but that resolved.  She states her appetite has been good and she is eating well.  She has noticed decrease in energy this past week as well.     Interval history 05/24/23 (CAlsop, APRN):  Patient is apprehensive and scared about cycle 2 but overall she has been doing well.  She stated that her constipation is resolved and she is having normal bowel movements.  She did state that her hair started to fall out so she recently shaved it which she has been dealing with mentally.  Otherwise no other significant clinical complaints.  Derm at the incision site is still present but appears to be decreasing in size.  According to the patient she did reach out to Dr. Donaldson and was informed that there was no concerns at this time.     Interval history 06/14/23 (CAlsop, APRN):  Patient has done well.  She did have what she believed to be a urinary tract infection.  UA was negative for any bacteria but she did have positive leukocytes.  She was  quite symptomatic with pain, burning and pressure with urination, therefore I did treat her with a short course of antibiotics with Macrobid.  She did complete the antibiotics and as of last night her symptoms have completely resolved.  She denies any foul odor or vaginal discharge.  She denies any nausea or vomiting.  Her constipation is well controlled.  She did have a headache for just a few days after treatment but otherwise no other complaints.    Interval history 7/5/2023: She comes in for her fourth and final cycle of Adriamycin and cyclophosphamide.  Overall she has done well with it.  She has about a week of feeling Malays and puny after treatment.  She has minimal nausea and has not taken any antiemetics after her premeds.  She did get a headache again but it was better than the previous time.  She does not wish to change her antiemetic regimen.  She had symptoms of urinary tract infection but a negative UA after the last cycle again and this is likely mucosal irritation.  She will be seeing radiation next week.  She has generalized weakness but no new focal motor weakness.    Interval history 8/9/2023: She is getting radiation therapy and is about jail done with a 15-day treatment.  She remains fatigued from the chemotherapy but her urinary symptoms have resolved.  No nausea or vomiting.  She has no increase in her stable motor weakness.  She would like to get her port out before the end of the year.  Her labs are stable and do not require frequent checking now.    Interval history 10/5/2023: She tolerated radiation therapy well with moderate erythema and some mild desquamation which is healing.  She has mild fatigue which is also resolving.  She is ready to initiate endocrine therapy.  Her hair is growing back slowly.    Interval history 12/13/2023: She noticed a lump in the left breast in November which got a little larger.  She had a mammogram on 11/14/2023 which showed hypoechoic lesion with  irregular margins and ultrasound guided biopsy was recommended.  Pathology revealed fat necrosis.  Subsequently she has felt well with no new problems.  She started anastrozole and is tolerating it very well with minimal hot flashes and no night sweats and no musculoskeletal symptoms of note.  She is gone over the effects of chemotherapy except for taste which has not come back fully yet.  Her hair continues to grow and slowly.  She has not had a bone density in several years.    Interval history 6/12/2024: She is feeling relatively well overall.  No change in her postpolio syndrome.  Mild to moderate hot flashes which are tolerable without pharmacologic intervention.  No significant night sweats on anastrozole.  No change in the feeling in her breast.  Bone density showed osteopenia with a T-score of -1.6 and -1.4.    Interval history 1/29/2025: She is tolerating anastrozole without difficulty.  She has mild hot flashes which are stable.  Her biggest new concern is a chronic cough which has been lasting the last few months.  It is nonproductive and not influenced by position.  It has been fairly consistent and is worrying her and her .  She has no sputum production or hemoptysis.  She did have an episode of COVID when she was coughing but this has occurred temporally distant from that.  Postpolio syndrome is stable.    Interval history 7/30/2025: She had a thyroid biopsy which was negative.  We will continue close follow-up only at this time.  Her weakness from postpolio syndrome seems to be getting worse.  She has a good spirit about it but is concerned about having progressive difficulty with ambulation and other activities of daily living.  She is tolerating anastrozole without any difficulty.  She has not had a mammogram in over a year.    Treatment history:  04/04/23: Left partial mastectomy and sentinel lymph node biopsy - Dr. Donaldson  05/03/23: C1D1 Adriamycin and Cyclophosphamide  05/24/23: C2D1  Adriamycin and Cyclophosphamide  06/14/23: C3D1 Adriamycin and Cyclophosphamide  7/5/2023: C4 D1 Adriamycin and cyclophosphamide      Allergies   Allergen Reactions    Penicillin G Hives     As a child    Tetracycline Vomiting     Current Outpatient Medications on File Prior to Encounter   Medication Sig Dispense Refill    HORSE CHESTNUT PO Take 1 Dose by mouth every day.      Red Yeast Rice Extract (RED YEAST RICE PO) Take  by mouth.      pravastatin (PRAVACHOL) 20 MG Tab Take 1 Tablet by mouth every evening. 90 Tablet 3    anastrozole (ARIMIDEX) 1 MG Tab Take 1 Tablet by mouth every day. 90 Tablet 1    Multiple Vitamins-Minerals (MULTIVITAMIN ADULT PO) Take  by mouth.       No current facility-administered medications on file prior to encounter.       Review of Systems   Constitutional:  Negative for chills, fever and weight loss.   Respiratory:  Negative for cough and shortness of breath.    Cardiovascular:  Negative for chest pain and palpitations.   Gastrointestinal:  Negative for constipation, diarrhea, nausea and vomiting.   Genitourinary:  Negative for dysuria.   Musculoskeletal:  Negative for myalgias.   Neurological:  Positive for headaches (slightly for a few days after chemo and then resolves). Negative for dizziness and tingling.              Objective     Temp 37.1 °C (98.7 °F) (Temporal)      Physical Exam  Vitals reviewed.   Constitutional:       General: She is not in acute distress.     Appearance: Normal appearance. She is not diaphoretic.   HENT:      Head: Normocephalic and atraumatic.   Cardiovascular:      Rate and Rhythm: Normal rate and regular rhythm.      Heart sounds: Normal heart sounds. No murmur heard.     No friction rub. No gallop.   Pulmonary:      Effort: Pulmonary effort is normal. No respiratory distress.      Breath sounds: Normal breath sounds. No wheezing.   Chest:      Comments: Firmness in left breast where lumpectomy site is consistent with fat necrosis.  1+  hyperpigmentation.  No masses nipple discharge or tenderness.  The right breast is normal.  Abdominal:      General: Bowel sounds are normal. There is no distension.      Palpations: Abdomen is soft.      Tenderness: There is no abdominal tenderness.   Musculoskeletal:      Comments: Confined to a wheel chair   Skin:     General: Skin is warm and dry.   Neurological:      Mental Status: She is alert and oriented to person, place, and time.   Psychiatric:         Mood and Affect: Mood normal.         Behavior: Behavior normal.             Latest Reference Range & Units 06/13/23 16:08   WBC 4.8 - 10.8 K/uL 4.8   RBC 4.20 - 5.40 M/uL 4.34   Hemoglobin 12.0 - 16.0 g/dL 12.2   Hematocrit 37.0 - 47.0 % 37.9   MCV 81.4 - 97.8 fL 87.3   MCH 27.0 - 33.0 pg 28.1   MCHC 32.2 - 35.5 g/dL 32.2   RDW 35.9 - 50.0 fL 44.3   Platelet Count 164 - 446 K/uL 371   MPV 9.0 - 12.9 fL 9.5   Neutrophils-Polys 44.00 - 72.00 % 57.70   Neutrophils (Absolute) 1.82 - 7.42 K/uL 2.78   Lymphocytes 22.00 - 41.00 % 19.00 (L)   Lymphs (Absolute) 1.00 - 4.80 K/uL 0.92 (L)   Monocytes 0.00 - 13.40 % 19.00 (H)   Monos (Absolute) 0.00 - 0.85 K/uL 0.92 (H)   Eosinophils 0.00 - 6.90 % 0.60   Eos (Absolute) 0.00 - 0.51 K/uL 0.03   Basophils 0.00 - 1.80 % 1.00   Baso (Absolute) 0.00 - 0.12 K/uL 0.05   Immature Granulocytes 0.00 - 0.90 % 2.70 (H)   Immature Granulocytes (abs) 0.00 - 0.11 K/uL 0.13 (H)   Nucleated RBC 0.00 - 0.20 /100 WBC 0.00   NRBC (Absolute) K/uL 0.00   Outpt Infus CBC Comment  see below   Sodium 135 - 145 mmol/L 143   Potassium 3.6 - 5.5 mmol/L 4.0   Chloride 96 - 112 mmol/L 106   Co2 20 - 33 mmol/L 24   Anion Gap 7.0 - 16.0  13.0   Glucose 65 - 99 mg/dL 123 (H)   Bun 8 - 22 mg/dL 10   Creatinine 0.50 - 1.40 mg/dL 0.56   GFR (CKD-EPI) >60 mL/min/1.73 m 2 98   Calcium 8.5 - 10.5 mg/dL 9.2   Correct Calcium 8.5 - 10.5 mg/dL 9.3   AST(SGOT) 12 - 45 U/L 27   ALT(SGPT) 2 - 50 U/L 22   Alkaline Phosphatase 30 - 99 U/L 76   Total Bilirubin 0.1 -  1.5 mg/dL 0.2   Albumin 3.2 - 4.9 g/dL 3.9   Total Protein 6.0 - 8.2 g/dL 6.3   Globulin 1.9 - 3.5 g/dL 2.4   A-G Ratio g/dL 1.6              Assessment & Plan        Impression:  1.  Invasive ductal carcinoma of the left breast, grade 3, clinically stage IIa (cT2, cN0) ER +95%, ID negative, HER2 IHC 2+ FISH negative, Ki-67 30%.  Status post left partial mastectomy and sentinel lymph node biopsy for invasive ductal carcinoma, grade 3, stage IIb (pT2, PN 1 LEXI).  Oncotype DX recurrence score 39.    Due to her postpolio syndrome AC x 4 was given as adjuvant chemotherapy to avoid taxanes and was reasonably well-tolerated.  Status post radiation therapy to the whole breast, now on anastrozole since October 2023 with excellent tolerance.  2.  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  Somewhat worse  3.  Nodule in the left breast postradiation and chemo biopsied fat necrosis.  Stable  4.  Osteopenia  5.  Chronic cough times several months.  Slowly improving but still persistent.  No evidence of disease on CT chest February 2025.    Plan: Continue anastrozole.  We will schedule mammogram for her.  I will see her back in 6 months for routine follow-up.    Please note that this dictation was created using voice recognition software. I have made every reasonable attempt to correct obvious errors, but I expect that there are errors of grammar and possibly content that I did not discover before finalizing the note.

## 2025-07-30 NOTE — ADDENDUM NOTE
Encounter addended by: William Hillman, Med Ass't on: 7/30/2025 4:06 PM   Actions taken: Charge Capture section accepted

## 2025-08-01 ENCOUNTER — OFFICE VISIT (OUTPATIENT)
Dept: MEDICAL GROUP | Facility: PHYSICIAN GROUP | Age: 75
End: 2025-08-01
Payer: MEDICARE

## 2025-08-01 VITALS
RESPIRATION RATE: 16 BRPM | WEIGHT: 215 LBS | HEART RATE: 86 BPM | SYSTOLIC BLOOD PRESSURE: 118 MMHG | OXYGEN SATURATION: 95 % | HEIGHT: 67 IN | DIASTOLIC BLOOD PRESSURE: 70 MMHG | BODY MASS INDEX: 33.74 KG/M2 | TEMPERATURE: 98.6 F

## 2025-08-01 DIAGNOSIS — I87.2 CHRONIC VENOUS INSUFFICIENCY: ICD-10-CM

## 2025-08-01 DIAGNOSIS — G14 POSTPOLIOMYELITIS SYNDROME: ICD-10-CM

## 2025-08-01 DIAGNOSIS — Z91.81 AT HIGH RISK FOR FALLS: ICD-10-CM

## 2025-08-01 DIAGNOSIS — Z17.0 MALIGNANT NEOPLASM OF UPPER-INNER QUADRANT OF LEFT BREAST IN FEMALE, ESTROGEN RECEPTOR POSITIVE (HCC): Primary | ICD-10-CM

## 2025-08-01 DIAGNOSIS — G14 POST-POLIO SYNDROME: ICD-10-CM

## 2025-08-01 DIAGNOSIS — B91 GENERALIZED WEAKNESS FOLLOWING POLIOMYELITIS: ICD-10-CM

## 2025-08-01 DIAGNOSIS — C50.212 MALIGNANT NEOPLASM OF UPPER-INNER QUADRANT OF LEFT BREAST IN FEMALE, ESTROGEN RECEPTOR POSITIVE (HCC): Primary | ICD-10-CM

## 2025-08-01 DIAGNOSIS — R73.03 PREDIABETES: ICD-10-CM

## 2025-08-01 DIAGNOSIS — R53.1 GENERALIZED WEAKNESS FOLLOWING POLIOMYELITIS: ICD-10-CM

## 2025-08-01 PROCEDURE — 3074F SYST BP LT 130 MM HG: CPT

## 2025-08-01 PROCEDURE — 99213 OFFICE O/P EST LOW 20 MIN: CPT

## 2025-08-01 PROCEDURE — 3078F DIAST BP <80 MM HG: CPT

## 2025-08-01 ASSESSMENT — ENCOUNTER SYMPTOMS
WEAKNESS: 1
SHORTNESS OF BREATH: 1

## 2025-08-01 ASSESSMENT — FIBROSIS 4 INDEX: FIB4 SCORE: 1.67

## 2025-08-01 ASSESSMENT — PATIENT HEALTH QUESTIONNAIRE - PHQ9: CLINICAL INTERPRETATION OF PHQ2 SCORE: 0

## 2025-08-05 DIAGNOSIS — E04.1 THYROID NODULE: Primary | ICD-10-CM

## 2025-08-15 ENCOUNTER — PATIENT MESSAGE (OUTPATIENT)
Dept: MEDICAL GROUP | Facility: PHYSICIAN GROUP | Age: 75
End: 2025-08-15
Payer: MEDICARE

## 2025-08-22 DIAGNOSIS — G14 POSTPOLIOMYELITIS SYNDROME: ICD-10-CM

## 2025-08-22 DIAGNOSIS — M79.673 PAIN OF FOOT, UNSPECIFIED LATERALITY: Primary | ICD-10-CM

## 2025-08-27 DIAGNOSIS — C50.212 MALIGNANT NEOPLASM OF UPPER-INNER QUADRANT OF LEFT BREAST IN FEMALE, ESTROGEN RECEPTOR POSITIVE (HCC): ICD-10-CM

## 2025-08-27 DIAGNOSIS — Z17.0 MALIGNANT NEOPLASM OF UPPER-INNER QUADRANT OF LEFT BREAST IN FEMALE, ESTROGEN RECEPTOR POSITIVE (HCC): ICD-10-CM

## 2025-08-27 RX ORDER — ANASTROZOLE 1 MG/1
1 TABLET ORAL DAILY
Qty: 90 TABLET | Refills: 0 | Status: SHIPPED | OUTPATIENT
Start: 2025-08-27

## 2025-09-25 ENCOUNTER — APPOINTMENT (OUTPATIENT)
Dept: MEDICAL GROUP | Facility: PHYSICIAN GROUP | Age: 75
End: 2025-09-25
Payer: MEDICARE

## 2026-02-03 ENCOUNTER — APPOINTMENT (OUTPATIENT)
Dept: MEDICAL GROUP | Facility: PHYSICIAN GROUP | Age: 76
End: 2026-02-03
Payer: MEDICARE

## (undated) DEVICE — SODIUM CHL IRRIGATION 0.9% 1000ML (12EA/CA)

## (undated) DEVICE — SHEAR HS FOCUS 9CM CVD - (6/BX)

## (undated) DEVICE — COVER CIV-FLEX TRANSDUCER - (24/BX)

## (undated) DEVICE — SUCTION INSTRUMENT YANKAUER BULBOUS TIP W/O VENT (50EA/CA)

## (undated) DEVICE — GOWN WARMING STANDARD FLEX - (30/CA)

## (undated) DEVICE — BLADE ELECTRODE COATED EDGE (50EA/PK)

## (undated) DEVICE — SUTURE GENERAL

## (undated) DEVICE — CLOSURE SKIN STRIP 1/2 X 4 IN - (STERI STRIP) (50/BX 4BX/CA)

## (undated) DEVICE — BINDER BREAST FLORAL MINT XL 40-45"

## (undated) DEVICE — MASK, LARYNGEAL AIRWAY #4

## (undated) DEVICE — MASK OXYGEN VNYL ADLT MED CONC WITH 7 FOOT TUBING  - (50EA/CA)

## (undated) DEVICE — CANNULA W/ SUPPLY TUBING O2 - (50/CA)

## (undated) DEVICE — SPONGE GAUZESTER. 2X2 4-PL - (2/PK 50PK/BX 30BX/CS)

## (undated) DEVICE — TUBE CONNECTING SUCTION - CLEAR PLASTIC STERILE 72 IN (50EA/CA)

## (undated) DEVICE — TOWEL STOP TIMEOUT SAFETY FLAG (40EA/CA)

## (undated) DEVICE — DRESSING TRANSPARENT FILM TEGADERM 4 X 4.75" (50EA/BX)"

## (undated) DEVICE — KIT  I.V. START (100EA/CA)

## (undated) DEVICE — CANISTER SUCTION RIGID RED 1500CC (40EA/CA)

## (undated) DEVICE — GLOVE BIOGEL SZ 6 PF LATEX - (50EA/BX 4BX/CA)

## (undated) DEVICE — GLOVE BIOGEL PI INDICATOR SZ 7.0 SURGICAL PF LF - (50/BX 4BX/CA)

## (undated) DEVICE — PLUMEPEN ULTRA 3/8 IN X 10 FT HOSE (20EA/CA)

## (undated) DEVICE — TUBING CLEARLINK DUO-VENT - C-FLO (48EA/CA)

## (undated) DEVICE — DRAPE IOBAN II 23 IN X 33 IN - (10/BX)

## (undated) DEVICE — LACTATED RINGERS INJ 1000 ML - (14EA/CA 60CA/PF)

## (undated) DEVICE — SENSOR OXIMETER ADULT SPO2 RD SET (20EA/BX)

## (undated) DEVICE — CANISTER SUCTION 3000ML MECHANICAL FILTER AUTO SHUTOFF MEDI-VAC NONSTERILE LF DISP  (40EA/CA)

## (undated) DEVICE — PAD MAGNETIC INSTRUMENT FOAM HOLDER (200/CA)

## (undated) DEVICE — SET LEADWIRE 5 LEAD BEDSIDE DISPOSABLE ECG (1SET OF 5/EA)

## (undated) DEVICE — GOWN SURGEONS LARGE - (32/CA)

## (undated) DEVICE — DRESSING TRANSPARENT FILM TEGADERM 2.375 X 2.75"  (100EA/BX)"

## (undated) DEVICE — SUTURE 2-0 PDS II CT-2 - (36/BX)

## (undated) DEVICE — SHEET TRANSVERSE LAP - (12EA/CA)

## (undated) DEVICE — SUTURE 3-0 VICRYL PLUS SH - 8X 18 INCH (12/BX)

## (undated) DEVICE — SLEEVE VASO CALF MED - (10PR/CA)

## (undated) DEVICE — DRAPE IOBAN II INCISE 23X17 - (10EA/BX 4BX/CA)

## (undated) DEVICE — GLOVE BIOGEL INDICATOR SZ 6.5 SURGICAL PF LTX - (50PR/BX 4BX/CA)

## (undated) DEVICE — SUTURE 4-0 MONOCRYL PLUS PS-2 - 27 INCH (36/BX)

## (undated) DEVICE — Device